# Patient Record
Sex: MALE | Race: WHITE | NOT HISPANIC OR LATINO | Employment: OTHER | URBAN - METROPOLITAN AREA
[De-identification: names, ages, dates, MRNs, and addresses within clinical notes are randomized per-mention and may not be internally consistent; named-entity substitution may affect disease eponyms.]

---

## 2018-02-19 ENCOUNTER — OFFICE VISIT CONVERTED (OUTPATIENT)
Dept: DIABETES SERVICES | Facility: HOSPITAL | Age: 58
End: 2018-02-19
Attending: NURSE PRACTITIONER

## 2018-04-02 ENCOUNTER — OFFICE VISIT CONVERTED (OUTPATIENT)
Dept: DIABETES SERVICES | Facility: HOSPITAL | Age: 58
End: 2018-04-02
Attending: NURSE PRACTITIONER

## 2018-05-16 ENCOUNTER — OFFICE VISIT CONVERTED (OUTPATIENT)
Dept: DIABETES SERVICES | Facility: HOSPITAL | Age: 58
End: 2018-05-16
Attending: NURSE PRACTITIONER

## 2018-06-19 ENCOUNTER — OFFICE VISIT CONVERTED (OUTPATIENT)
Dept: DIABETES SERVICES | Facility: HOSPITAL | Age: 58
End: 2018-06-19
Attending: NURSE PRACTITIONER

## 2018-08-09 ENCOUNTER — OFFICE VISIT CONVERTED (OUTPATIENT)
Dept: DIABETES SERVICES | Facility: HOSPITAL | Age: 58
End: 2018-08-09
Attending: NURSE PRACTITIONER

## 2018-08-23 ENCOUNTER — OFFICE VISIT CONVERTED (OUTPATIENT)
Dept: DIABETES SERVICES | Facility: HOSPITAL | Age: 58
End: 2018-08-23
Attending: NURSE PRACTITIONER

## 2018-10-01 ENCOUNTER — OFFICE VISIT CONVERTED (OUTPATIENT)
Dept: DIABETES SERVICES | Facility: HOSPITAL | Age: 58
End: 2018-10-01
Attending: NURSE PRACTITIONER

## 2018-11-05 ENCOUNTER — OFFICE VISIT CONVERTED (OUTPATIENT)
Dept: DIABETES SERVICES | Facility: HOSPITAL | Age: 58
End: 2018-11-05
Attending: NURSE PRACTITIONER

## 2019-01-02 ENCOUNTER — HOSPITAL ENCOUNTER (OUTPATIENT)
Dept: DIABETES SERVICES | Facility: HOSPITAL | Age: 59
Discharge: HOME | End: 2019-01-02
Attending: NURSE PRACTITIONER

## 2019-01-02 ENCOUNTER — OFFICE VISIT CONVERTED (OUTPATIENT)
Dept: DIABETES SERVICES | Facility: HOSPITAL | Age: 59
End: 2019-01-02
Attending: NURSE PRACTITIONER

## 2019-01-29 ENCOUNTER — OFFICE VISIT CONVERTED (OUTPATIENT)
Dept: DIABETES SERVICES | Facility: HOSPITAL | Age: 59
End: 2019-01-29
Attending: NURSE PRACTITIONER

## 2019-01-29 ENCOUNTER — HOSPITAL ENCOUNTER (OUTPATIENT)
Dept: DIABETES SERVICES | Facility: HOSPITAL | Age: 59
Discharge: HOME | End: 2019-01-29
Attending: NURSE PRACTITIONER

## 2019-02-20 ENCOUNTER — OFFICE VISIT CONVERTED (OUTPATIENT)
Dept: GASTROENTEROLOGY | Facility: CLINIC | Age: 59
End: 2019-02-20
Attending: NURSE PRACTITIONER

## 2019-03-07 ENCOUNTER — OFFICE VISIT CONVERTED (OUTPATIENT)
Dept: DIABETES SERVICES | Facility: HOSPITAL | Age: 59
End: 2019-03-07
Attending: NURSE PRACTITIONER

## 2019-03-07 ENCOUNTER — HOSPITAL ENCOUNTER (OUTPATIENT)
Dept: DIABETES SERVICES | Facility: HOSPITAL | Age: 59
Discharge: HOME | End: 2019-03-07
Attending: NURSE PRACTITIONER

## 2019-05-14 ENCOUNTER — HOSPITAL ENCOUNTER (OUTPATIENT)
Dept: OTHER | Facility: HOSPITAL | Age: 59
Discharge: HOME | End: 2019-05-14
Attending: INTERNAL MEDICINE

## 2019-05-14 LAB
ALBUMIN SERPL-MCNC: 4.2 G/DL (ref 3.5–5)
ALBUMIN/GLOB SERPL: 1.3 {RATIO} (ref 1.4–2.6)
ALP SERPL-CCNC: 146 U/L (ref 56–119)
ALT SERPL-CCNC: 16 U/L (ref 10–40)
ANION GAP SERPL CALC-SCNC: 14 MMOL/L (ref 8–19)
AST SERPL-CCNC: 18 U/L (ref 15–50)
BASOPHILS # BLD AUTO: 0.08 10*3/UL (ref 0–0.2)
BASOPHILS NFR BLD AUTO: 1 % (ref 0–3)
BILIRUB SERPL-MCNC: 0.47 MG/DL (ref 0.2–1.3)
BUN SERPL-MCNC: 19 MG/DL (ref 5–25)
BUN/CREAT SERPL: 19 {RATIO} (ref 6–20)
CALCIUM SERPL-MCNC: 9.7 MG/DL (ref 8.7–10.4)
CHLORIDE SERPL-SCNC: 103 MMOL/L (ref 99–111)
CONV ABS IMM GRAN: 0.03 10*3/UL (ref 0–0.2)
CONV CO2: 29 MMOL/L (ref 22–32)
CONV IMMATURE GRAN: 0.4 % (ref 0–1.8)
CONV RHEUMATOID FACTOR IGM: 59.8 [IU]/ML (ref 0–14)
CONV TOTAL PROTEIN: 7.5 G/DL (ref 6.3–8.2)
CREAT UR-MCNC: 0.99 MG/DL (ref 0.7–1.2)
CRP SERPL-MCNC: 1.7 MG/L (ref 0–5)
DEPRECATED RDW RBC AUTO: 45.2 FL (ref 35.1–43.9)
EOSINOPHIL # BLD AUTO: 0.25 10*3/UL (ref 0–0.7)
EOSINOPHIL # BLD AUTO: 3.1 % (ref 0–7)
ERYTHROCYTE [DISTWIDTH] IN BLOOD BY AUTOMATED COUNT: 13.4 % (ref 11.6–14.4)
ERYTHROCYTE [SEDIMENTATION RATE] IN BLOOD: 13 MM/H (ref 0–20)
GFR SERPLBLD BASED ON 1.73 SQ M-ARVRAT: >60 ML/MIN/{1.73_M2}
GLOBULIN UR ELPH-MCNC: 3.3 G/DL (ref 2–3.5)
GLUCOSE SERPL-MCNC: 248 MG/DL (ref 70–99)
HBA1C MFR BLD: 15.2 G/DL (ref 14–18)
HCT VFR BLD AUTO: 47.2 % (ref 42–52)
LYMPHOCYTES # BLD AUTO: 2.01 10*3/UL (ref 1–5)
MCH RBC QN AUTO: 29.6 PG (ref 27–31)
MCHC RBC AUTO-ENTMCNC: 32.2 G/DL (ref 33–37)
MCV RBC AUTO: 91.8 FL (ref 80–96)
MONOCYTES # BLD AUTO: 0.65 10*3/UL (ref 0.2–1.2)
MONOCYTES NFR BLD AUTO: 7.9 % (ref 3–10)
NEUTROPHILS # BLD AUTO: 5.17 10*3/UL (ref 2–8)
NEUTROPHILS NFR BLD AUTO: 63.1 % (ref 30–85)
NRBC CBCN: 0 % (ref 0–0.7)
OSMOLALITY SERPL CALC.SUM OF ELEC: 303 MOSM/KG (ref 273–304)
PLATELET # BLD AUTO: 320 10*3/UL (ref 130–400)
PMV BLD AUTO: 9.6 FL (ref 9.4–12.4)
POTASSIUM SERPL-SCNC: 4.9 MMOL/L (ref 3.5–5.3)
RBC # BLD AUTO: 5.14 10*6/UL (ref 4.7–6.1)
SODIUM SERPL-SCNC: 141 MMOL/L (ref 135–147)
VARIANT LYMPHS NFR BLD MANUAL: 24.5 % (ref 20–45)
WBC # BLD AUTO: 8.19 10*3/UL (ref 4.8–10.8)

## 2019-05-15 LAB
CONV ANTI NUCLEAR ANTIBODY WITH REFLEX: NEGATIVE
CONV HEPATITIS B SURFACE AG W CONFIRMATION RE: NEGATIVE
HCV AB SER DONR QL: 0.1 S/CO RATIO (ref 0–0.9)

## 2019-05-28 ENCOUNTER — HOSPITAL ENCOUNTER (OUTPATIENT)
Dept: CT IMAGING | Facility: HOSPITAL | Age: 59
Discharge: HOME | End: 2019-05-28
Attending: INTERNAL MEDICINE

## 2019-06-21 ENCOUNTER — HOSPITAL ENCOUNTER (OUTPATIENT)
Dept: OTHER | Facility: HOSPITAL | Age: 59
Discharge: HOME | End: 2019-06-21
Attending: INTERNAL MEDICINE

## 2019-06-24 ENCOUNTER — OFFICE VISIT CONVERTED (OUTPATIENT)
Dept: DIABETES SERVICES | Facility: HOSPITAL | Age: 59
End: 2019-06-24
Attending: NURSE PRACTITIONER

## 2019-06-25 LAB
CONV QUANTIFERON TB GOLD: NEGATIVE
QUANTIFERON CRITERIA: NORMAL
QUANTIFERON MITOGEN VALUE: >10 IU/ML
QUANTIFERON NIL VALUE: 0.06 IU/ML
QUANTIFERON TB1 AG VALUE: 0.1 IU/ML
QUANTIFERON TB2 AG VALUE: 0.03 IU/ML

## 2019-07-29 ENCOUNTER — OFFICE VISIT CONVERTED (OUTPATIENT)
Dept: DIABETES SERVICES | Facility: HOSPITAL | Age: 59
End: 2019-07-29
Attending: NURSE PRACTITIONER

## 2019-09-24 ENCOUNTER — HOSPITAL ENCOUNTER (OUTPATIENT)
Dept: DIABETES SERVICES | Facility: HOSPITAL | Age: 59
Discharge: HOME | End: 2019-09-24
Attending: NURSE PRACTITIONER

## 2019-09-24 ENCOUNTER — OFFICE VISIT CONVERTED (OUTPATIENT)
Dept: DIABETES SERVICES | Facility: HOSPITAL | Age: 59
End: 2019-09-24
Attending: NURSE PRACTITIONER

## 2019-12-04 ENCOUNTER — OFFICE VISIT CONVERTED (OUTPATIENT)
Dept: GASTROENTEROLOGY | Facility: CLINIC | Age: 59
End: 2019-12-04
Attending: NURSE PRACTITIONER

## 2019-12-04 ENCOUNTER — HOSPITAL ENCOUNTER (OUTPATIENT)
Dept: OTHER | Facility: HOSPITAL | Age: 59
Discharge: HOME | End: 2019-12-04
Attending: INTERNAL MEDICINE

## 2019-12-04 LAB
ANION GAP SERPL CALC-SCNC: 17 MMOL/L (ref 8–19)
BASOPHILS # BLD AUTO: 0.03 10*3/UL (ref 0–0.2)
BASOPHILS NFR BLD AUTO: 0.4 % (ref 0–3)
BUN SERPL-MCNC: 25 MG/DL (ref 5–25)
BUN/CREAT SERPL: 28 {RATIO} (ref 6–20)
CALCIUM SERPL-MCNC: 9.9 MG/DL (ref 8.7–10.4)
CHLORIDE SERPL-SCNC: 100 MMOL/L (ref 99–111)
CONV ABS IMM GRAN: 0.03 10*3/UL (ref 0–0.2)
CONV CO2: 26 MMOL/L (ref 22–32)
CONV IMMATURE GRAN: 0.4 % (ref 0–1.8)
CREAT UR-MCNC: 0.9 MG/DL (ref 0.7–1.2)
DEPRECATED RDW RBC AUTO: 46.7 FL (ref 35.1–43.9)
EOSINOPHIL # BLD AUTO: 0 % (ref 0–7)
EOSINOPHIL # BLD AUTO: 0 10*3/UL (ref 0–0.7)
ERYTHROCYTE [DISTWIDTH] IN BLOOD BY AUTOMATED COUNT: 13.4 % (ref 11.6–14.4)
GFR SERPLBLD BASED ON 1.73 SQ M-ARVRAT: >60 ML/MIN/{1.73_M2}
GLUCOSE SERPL-MCNC: 308 MG/DL (ref 70–99)
HCT VFR BLD AUTO: 44.5 % (ref 42–52)
HGB BLD-MCNC: 14.4 G/DL (ref 14–18)
LYMPHOCYTES # BLD AUTO: 1.07 10*3/UL (ref 1–5)
LYMPHOCYTES NFR BLD AUTO: 13.6 % (ref 20–45)
MCH RBC QN AUTO: 30.8 PG (ref 27–31)
MCHC RBC AUTO-ENTMCNC: 32.4 G/DL (ref 33–37)
MCV RBC AUTO: 95.3 FL (ref 80–96)
MONOCYTES # BLD AUTO: 0.25 10*3/UL (ref 0.2–1.2)
MONOCYTES NFR BLD AUTO: 3.2 % (ref 3–10)
NEUTROPHILS # BLD AUTO: 6.49 10*3/UL (ref 2–8)
NEUTROPHILS NFR BLD AUTO: 82.4 % (ref 30–85)
NRBC CBCN: 0 % (ref 0–0.7)
OSMOLALITY SERPL CALC.SUM OF ELEC: 300 MOSM/KG (ref 273–304)
PLATELET # BLD AUTO: 294 10*3/UL (ref 130–400)
PMV BLD AUTO: 9.5 FL (ref 9.4–12.4)
POTASSIUM SERPL-SCNC: 5.7 MMOL/L (ref 3.5–5.3)
RBC # BLD AUTO: 4.67 10*6/UL (ref 4.7–6.1)
SODIUM SERPL-SCNC: 137 MMOL/L (ref 135–147)
WBC # BLD AUTO: 7.87 10*3/UL (ref 4.8–10.8)

## 2020-01-07 ENCOUNTER — OFFICE VISIT CONVERTED (OUTPATIENT)
Dept: GASTROENTEROLOGY | Facility: CLINIC | Age: 60
End: 2020-01-07
Attending: NURSE PRACTITIONER

## 2020-02-17 ENCOUNTER — HOSPITAL ENCOUNTER (OUTPATIENT)
Dept: GASTROENTEROLOGY | Facility: HOSPITAL | Age: 60
Setting detail: HOSPITAL OUTPATIENT SURGERY
Discharge: HOME | End: 2020-02-17
Attending: INTERNAL MEDICINE

## 2021-05-15 VITALS
HEIGHT: 71 IN | DIASTOLIC BLOOD PRESSURE: 65 MMHG | HEART RATE: 103 BPM | SYSTOLIC BLOOD PRESSURE: 130 MMHG | BODY MASS INDEX: 20.09 KG/M2 | WEIGHT: 143.5 LBS

## 2021-05-15 VITALS
HEART RATE: 99 BPM | WEIGHT: 141.12 LBS | SYSTOLIC BLOOD PRESSURE: 141 MMHG | BODY MASS INDEX: 19.76 KG/M2 | DIASTOLIC BLOOD PRESSURE: 65 MMHG | HEIGHT: 71 IN

## 2021-05-16 VITALS
HEART RATE: 90 BPM | BODY MASS INDEX: 21.28 KG/M2 | SYSTOLIC BLOOD PRESSURE: 141 MMHG | HEIGHT: 71 IN | WEIGHT: 152 LBS | DIASTOLIC BLOOD PRESSURE: 96 MMHG

## 2021-05-28 NOTE — PROGRESS NOTES
Patient: QUETA CABALLERO     Acct: [id number]     Report: #[id number]  UNIT #: [id number]     : 1960    Encounter Date:2019  PRIMARY CARE: LAURENCE STRONG  ***Signed***  --------------------------------------------------------------------------------------------------------------------  Encounter Date      2019            Reason for Visit      This patient is seen in the office today for follow-up evaluation for insulin     pump management.  He is a 59-year-old male patient with a history of type 1     diabetes uncontrolled.  Patient is currently managed on a Medtronic insulin     pump.            This patient has had a history of problematic hypoglycemia.  He has been on a     continuous glucose sensor with this pump in the past however he struggles with     accuracy of that sensor.  He is in need of an upgrade to his pump.  I feel like     this patient would benefit considerably from a Medtronic 670 G insulin pump with    continuous glucose sensor and auto mode feature to help minimize his risk of     hypoglycemia.  The patient states he was recently approved for disability and is    awaiting finalization so that he can attempt to get an upgrade of his pump with     his new insurance.            His pump record was uploaded for 14-day period of time and reviewed with the     patient.  The pump record indicates an average glucose of 165 mg/dL  ±83 mg/dL.      He is testing his blood sugar 8 times a day.  He is using approximately 35 units    of insulin each day.  During this 14-day period of time the patient continues to    struggle with hypoglycemia.  There were 11 recorded episodes of hypoglycemia     during this timeframe.  Glucose levels widely fluctuate from greater than 400 to    less than 40.  It is difficult to determine if there is a pattern of behavior or    any particular cause of the hypoglycemia.  It does appear that there could be     some correlation between bolusing  for carbohydrates and correction that could be    causing some of the hypoglycemia.            History            History: He denies any health issues or changes since last being seen.            Allergies/Medications      Allergies:        Coded Allergies:             *No Known Allergies (Verified  Allergy, Unknown, 4/23/19)      Medications    Last Reconciled on 3/11/19 3:23 pm by ESTELA GRANDE      Insulin Lispro (HumaLOG VIAL*) 100 Units/Ml Vial      35 SUBQ QDAY, #1 VIAL 0 Refills         Reported         10/9/17            Quality Measures      Current yr A1c result 7-9%:  Yes      Pos ua mAlb this yr in chart:  Yes            Impression      Type 1 diabetes uncontrolled            Plan      At this time to minimize the episodes of hypoglycemia the carbohydrate ratio of     1-25 was changed to a ratio of 1-30 and the sensitivity of 1-65 was changed to     1-70.  And this should decrease the amount of insulin he is receiving with sherri    uses and hopefully reduce the risk of hypoglycemia.  The patient will contact     our office as soon as he has final notification of his new insurance so that we     can process a request for an upgrade of his insulin pump with a continuous     glucose sensor.  Given the severity and frequency of his hypoglycemia I feel it     is necessary this patient be monitored using continuous glucose monitoring for     his safety.            Total time spent with patient was 15 minutes of which half of that time was     spent counseling the patient regarding hypoglycemia management            PREVENTION      Influenza Vaccine Declined:  Yes      Encouraged to follow-up with:  PCP regarding preventative exams.                 Disclaimer: Converted document may not contain table formatting or lab diagrams. Please see [employer] System for the authenticated document.

## 2021-05-28 NOTE — PROGRESS NOTES
Patient: QUETA CABALLERO     Acct: [id number]     Report: #[id number]  UNIT #: [id number]     : 1960    Encounter Date:2018  PRIMARY CARE: LAURENCE STRONG  ***Signed***  --------------------------------------------------------------------------------------------------------------------  Encounter Date      2018            Reason for Visit      This patient returns to the office today for follow-up evaluation for insulin     pump management.  He is a 57-year-old male patient with a history of     uncontrolled type I diabetes.  He is currently managed on a Medtronic insulin     pump with continuous glucose sensor.  His insulin pump was uploaded for 2 week     period of time and the records were reviewed with the patient.             The patient reports he's been having problems with his sensor.  He states that     he will wear it and it we'll lose signal or give predictive alerts indicating     that his glucose is falling when in actuality it isn't.  He also reports wide     variations between the sensor glucose and blood glucose.  In review of the pump     reports today the patient has not worn his sensor except for 4 days in the last     2 weeks because of sensor issues.  He states he place the sensor back on today.      After talking with the patient he reports he has not had his transmitter     replaced since getting the pump.            Pump report indicates an average blood glucose of 191 mg/dL plus or -84.  He is     testing his blood sugar 5-7 times each day.  There is insufficient sensor wear     time during this period of time to give sensor average or other data.  Blood     glucose checks indicate significant hyperglycemia in the early morning hours up     until approximate 1 PM.  There is some hyperglycemia in the evening but it is     less frequent.  During this 2 week period of time the patient has also     experienced some episodes of hypoglycemia in the late afternoon  hours.  The     patient states that he is having to double his carbohydrate amounts to prevent     hyperglycemia after bolusing for food intake.  He says if he fails to do this     he will have glucose levels in the 300-400 range.  I'm suspicious that some of     the hypoglycemia that he is experiencing is because of him doubling this     carbohydrate amount.            History: He denies any health issues or changes since last being seen.            Impression: Type I diabetes uncontrolled with hypoglycemia and hyperglycemia            Allergies/Medications      Allergies:        Coded Allergies:             *No Known Allergies (Verified  Allergy, 1/10/13)      Uncoded Allergies:             NO KNOWN DRUG ALLERGY (Allergy, UNK, 3/22/13)      Medications    Last Reconciled on 4/2/18 15:28 by ESTELA GRANDE      Insulin Lispro (HumaLOG VIAL*) 100 Units/Ml Vial      35 SUBQ QDAY, #1 VIAL 0 Refills         Reported         10/9/17            Quality Measures      Current yr A1c less than 7%:  No      Current yr A1c result 7-9%:  No      Current yr A1c more than 9:  No      Cur yr dilat eye exam in chart:  No      Pos ua mAlb this yr in chart:  No      Neg ua mAlb this yr in chart:  No            Plan      Plan: At this time changes were made to the patient's pump setting to prevent     the significant hyperglycemia in the overnight in early morning hours as well     as postprandial hyperglycemia.  In the 12 AM to 4 AM basal rate of 0.6 was     increased to 1.0 and extended through the our of 3 PM (the 4 AM basal rate of     0.4 and 9 AM basal rate of 1.0 were deleted); the carbohydrate ratio was     changed to a 1-15 carbohydrate ratio around-the-clock eliminating the 12 AM     ratio of 1-30 and the 11 AM ratio of 1-20.  The patient is encouraged to get     his sensor transmitter replaced as soon as possible.  He is to monitor very     carefully for hypoglycemia and he is to enter only the correct amount of      carbohydrates into his pump, not to double the amount.  The patient is     instructed to call my office in one week to report the effects of these changes     and to call sooner if he experiences any increase hypoglycemia.  He will     otherwise be scheduled for follow-up appointment in 1 month            Total time spent with patient was 15 minutes of which half of that time was     spent counseling the patient regarding hypoglycemia prevention and treatment            Patient Education      Yes            Hx Influenza Vaccination:  No (DECLINES PER DAUGHTER.)      Influenza Vaccine Declined:  Yes      2 or More Falls Past Year?:  No      Fall Past Year with Injury?:  No      Hx Pneumococcal Vaccination:  No      Encouraged to follow-up with:  PCP regarding preventative exams.                 Disclaimer: Converted document may not contain table formatting or lab diagrams. Please see [employer] [facility] for the authenticated document.

## 2021-05-28 NOTE — PROGRESS NOTES
Patient: QUETA CABALLERO     Acct: [id number]     Report: #[id number]  UNIT #: [id number]     : 1960    Encounter Date:2018  PRIMARY CARE: LAURENCE STRONG  ***Signed***  --------------------------------------------------------------------------------------------------------------------  Encounter Date      2018            Reason for Visit      This patient returns to the office today for follow-up evaluation for insulin     pump management.  He is a 58-year-old male patient with a history of type I     diabetes.  He is currently managed on a Medtronic insulin pump with continuous     glucose sensor.  On 18 the patient called our office to report that he was     having episodes of hypoglycemia after his meals.  After that time the patient     was instructed to adjust his current carb ratio of 1-15 to a new ratio of 1-20.      The patient reports that this helped minimally but he continues to have     problems with hypoglycemia.  Patient states that he has had 911 called 3 times     since his last appointment one month ago because of hypoglycemia.            His insulin pump was uploaded and reviewed.  Records do indicate widely     fluctuating glucose levels.  His average blood glucose is 179+ or -90 mg/dL he     is testing his blood sugar 7-8 times a day.  He's using approximately 44 units     of insulin each day.  There are recorded highs above 400 and lows of less than     40.  The patient states that a week ago EMS was called and he was found to have     a glucose level of 24.  Patient states that he had had a mildly low her glucose     level and he treated it but subsequent to treating it his glucose level went     above 400.  He then took a bolus of insulin and ate an additional snack and     later dropped low.  During this 2 week period of time the patient has     experienced 9 episodes of hypoglycemia.  The episodes of hypoglycemia are     random without any particular  pattern other than possible postprandial post     bolus hypoglycemia.  Some of the episodes are occurring 2 or 3 hours after the     patient has corrected glucose or covered carbohydrate intake while others are     occurring 5 or 6 hours after that behavior.  This makes it very difficult to     troubleshoot the potential cause of the hypoglycemia.  The patient states that     he does note that he is more prone to hypoglycemia if he is outside in the     heat.  The patient has had his thyroid checked by his primary care provider to     determine if this could be a factor contributing to his widely fluctuating     glucose levels.  The patient states that his provider reports that his levels     are within normal limits.            History            History: 3 calls to 911 for hypoglycemia otherwise he denies any health issues     or changes since last being seen.            Allergies/Medications      Allergies:        Coded Allergies:             *No Known Allergies (Verified  Allergy, 1/10/13)      Uncoded Allergies:             NO KNOWN DRUG ALLERGY (Allergy, UNK, 3/22/13)      Medications    Last Reconciled on 6/19/18 15:02 by ESTELA GRANDE      Insulin Lispro (HumaLOG VIAL*) 100 Units/Ml Vial      35 SUBQ QDAY, #1 VIAL 0 Refills         Reported         10/9/17            Quality Measures      Current yr A1c less than 7%:  No      Current yr A1c result 7-9%:  No      Current yr A1c more than 9:  No      Cur yr dilat eye exam in chart:  No      Pos ua mAlb this yr in chart:  No      Neg ua mAlb this yr in chart:  No            Impression      Type I diabetes uncontrolled with hypoglycemia            Plan            Plan: At this time we're at a point that the patient is going to have to be     allowed to run more hyperglycemic to prevent the severe hypoglycemia.  I have     adjusted his carbohydrate ratio again from the current ratio of 1-20 to a new     ratio of 1-25 and I also adjusted his current sensitivity  from 1-75 to a     sensitivity of 1 to 80.  These changes should result in smaller boluses for     correction and carbohydrate coverage.  The patient was also instructed and     strongly encouraged to use temporary basal rate reduction if he anticipates any     outdoor activities her increasing physical activity.  Use of this feature was     demonstrated and the patient verbalized understanding.            In the meantime I am going to contact the insulin pump company to see if the     patient is eligible for an insulin pump upgrade that may be safer for this     patient with more advanced technology that can prevent hypoglycemia.  I feel     this patient would benefit from the Medtronic 670 G insulin pump that can     operate in auto mode to prevent the hypoglycemia as well as hyperglycemia the     patient is experiencing.  Depending on what his insurance will approve we will     proceed from there.  In the meantime the patient is scheduled for follow-up in     6 weeks and he is to contact our office if he continues to have problematic     hypoglycemia.            Total time spent with patient was 10 minutes of which half of that time was     spent counseling the patient regarding use of temporary basal rate to prevent     hypoglycemia            Patient Education      Yes            PREVENTION      Hx Influenza Vaccination:  No (DECLINES PER DAUGHTER.)      Influenza Vaccine Declined:  Yes      2 or More Falls Past Year?:  No      Fall Past Year with Injury?:  No      Hx Pneumococcal Vaccination:  No      Encouraged to follow-up with:  PCP regarding preventative exams.                 Disclaimer: Converted document may not contain table formatting or lab diagrams. Please see [employer] [facility] for the authenticated document.

## 2021-05-28 NOTE — PROGRESS NOTES
Patient: QUETA CABALLERO     Acct: [id number]     Report: #[id number]  UNIT #: [id number]     : 1960    Encounter Date:2018  PRIMARY CARE: LAURENCE STRONG  ***Signed***  --------------------------------------------------------------------------------------------------------------------  Encounter Date      Aug 23, 2018            Reason for Visit      This patient seen in the office today for follow-up evaluation for insulin pump     management.  He is a 58-year-old male patient with a history of type I diabetes     uncontrolled.  He was most recently seen for a set up of a new insulin pump as     his previous device had failed on him.  At his current device was uploaded and     the records are reviewed with the patient on this visit.            Records indicate an average glucose of 175 mg/dL plus or -70 mg/dL.  He is     testing his glucose 8-10 times each day.  He's using approximately 30 units of     insulin each day.  Glucose levels are widely fluctuating with lows in the 40s     and highs above 300.  There is no clear pattern of the hypoglycemia.  There is a    slight increase in episodes during the midday.  The patient does have     hypoglycemia if he has increase physical activity so he will accommodate by     using the temporary basal rate with his pump to prevent that from occurring that    other episodes occur without any warning.  The patient does have a continuous     glucose sensor but he is had lots of difficulties with the sensor accuracy.  He     states it we'll say that he is high and in actuality he is not and vice versa.      It is noted that the patient is wearing his pump infusion sets for up to 6 days.     He was counseled on the importance of changing his infusion sets every 3 days     to help decrease some glycemic variability due to bad infusion sites.            We continue to struggle with trying to achieve glycemic control with this     patient.  Slight  adjustments to correct for hyperglycemia lead to hypoglycemia     and vice versa.  The patient has been evaluated for other factors that may be     contributing to his glycemic variability such as hypothyroidism but this has     been ruled out.  I would like to see the patient on a different pump such as the    Medtronic 670 G with auto mode as this might help prevent some of the hypo-and     hyperglycemia however he is currently attempting to get Medicare disability and     wants to wait until that decision is made before getting a new device.            History            History: He denies any health issues or changes since last being seen.            Allergies/Medications      Allergies:        Coded Allergies:             *No Known Allergies (Verified  Allergy, 1/10/13)      Uncoded Allergies:             NO KNOWN DRUG ALLERGY (Allergy, UNK, 3/22/13)      Medications    Last Reconciled on 8/24/18 15:17 by ESTELA GRANDE      Insulin Lispro (HumaLOG VIAL*) 100 Units/Ml Vial      35 SUBQ QDAY, #1 VIAL 0 Refills         Reported         10/9/17            Quality Measures      Current yr A1c less than 7%:  No      Current yr A1c result 7-9%:  No      Current yr A1c more than 9:  No      Cur yr dilat eye exam in chart:  No      Pos ua mAlb this yr in chart:  No      Neg ua mAlb this yr in chart:  No            Impression      Type I diabetes uncontrolled with hypoglycemia            Plan            Because of the patient's significant risk of hypoglycemia along with the     frequency and severity of hypoglycemia changes were made to his pump settings to    minimize the risk of during the mid morning hours.  The patient is also     hyperglycemic however I am more concerned about trying to resolve the     hypoglycemia at this particular time.  The following changes were made            The 8 AM to 3 PM basal rate of 0.8 was changed with the time block ending at 11     AM and the basal rate decreased to 0.6 until 3  PM.  The patient was strongly     encouraged to try to start using his sensor with more frequency and if he     continues to struggle them he may need to evaluate the need for new transmitter     which could be the problem.  He is also encouraged to changes infusion sets ever    y 3 days and was instructed on the best way to achieve this.  He'll be scheduled    for follow-up appointment in 1 month.  In the meantime I'm going to contact the     pump company to see what sort of options we might have to this patient.            Total time spent with patient was 10 minutes of which half of that time was     spent counseling the patient regarding hypoglycemia prevention and management            Patient Education      Yes            PREVENTION      Hx Influenza Vaccination:  No (DECLINES PER DAUGHTER.)      Influenza Vaccine Declined:  Yes      2 or More Falls Past Year?:  No      Fall Past Year with Injury?:  No      Hx Pneumococcal Vaccination:  No      Encouraged to follow-up with:  PCP regarding preventative exams.                 Disclaimer: Converted document may not contain table formatting or lab diagrams. Please see [employer] [facility] for the authenticated document.

## 2021-05-28 NOTE — PROGRESS NOTES
Patient: QUETA CABALLERO     Acct: [id number]     Report: #[id number]  UNIT #: [id number]     : 1960    Encounter Date:10/01/2018  PRIMARY CARE: LAURENCE STRONG  ***Signed***  --------------------------------------------------------------------------------------------------------------------  Encounter Date      Oct 1, 2018            Reason for Visit      This patient returns to the office today for follow-up evaluation for insulin     pump management.  He is a 58-year-old male patient with a history of type 1     diabetes.  He is currently managed on a Medtronic insulin pump.  His insulin     pump was uploaded for 14-day period of time and the records were reviewed with     the patient.            Records indicate an average blood glucose of 178 mg/dL  ±76 mg/dL.  He is testing    his blood sugars 7-8 times each day.  He is using approximately 30 units of     insulin each day.  Historically the patient has had problematic severe h    ypoglycemia and changes were made to his pump settings at his last appointment     to minimize the risk of the hypoglycemia.  Records do indicate a significant     improvement in the frequency of hypoglycemia.  During this 14-day period of time    there were 3 episodes of hypoglycemia in the 60s which is much improved for this    patient.  However, the patient is having hyperglycemia quite frequently     throughout the day.  We have struggled to maintain balance between reducing the     hyperglycemia and minimizing the risk of hypoglycemia with this patient.  The     patient also has a continuous glucose sensor that he typically wears however he     has had problems with the transmitter not connecting properly.  I did     troubleshoot the device and discovered that the transmitter was not charging     because the  needed a battery replacement.            History            History: He denies any health issues or changes since last being seen.             Allergies/Medications      Allergies:        Coded Allergies:             *No Known Allergies (Verified  Allergy, 1/10/13)      Uncoded Allergies:             NO KNOWN DRUG ALLERGY (Allergy, UNK, 3/22/13)      Medications    Last Reconciled on 10/2/18 09:41 by ESTELA GRANDE      Insulin Lispro (HumaLOG VIAL*) 100 Units/Ml Vial      35 SUBQ QDAY, #1 VIAL 0 Refills         Reported         10/9/17            Quality Measures      Current yr A1c less than 7%:  No      Current yr A1c result 7-9%:  No      Current yr A1c more than 9:  No      Cur yr dilat eye exam in chart:  No      Pos ua mAlb this yr in chart:  No      Neg ua mAlb this yr in chart:  No            Impression      Type 1 diabetes uncontrolled            Plan      At this time we elected to make no changes to the patient's pump settings.  He     was instructed to resume the continuous glucose sensor now that we have been     able to determine why it was not working properly.  We will continue to monitor     his glucose levels and if the hypoglycemia has remained stabilized we will focus    on slowly adjusting the pump to minimize hyperglycemia at his next appointment.     He will be seen for follow-up in 2 months            Total time spent with patient was 10 minutes of which half of that time was     spent counseling the patient regarding reinforced dietary instructions to     minimize risk of hyperglycemia            Patient Education      Yes            PREVENTION      Hx Influenza Vaccination:  No (DECLINES PER DAUGHTER.)      Influenza Vaccine Declined:  Yes      2 or More Falls Past Year?:  No      Fall Past Year with Injury?:  No      Hx Pneumococcal Vaccination:  No      Encouraged to follow-up with:  PCP regarding preventative exams.                 Disclaimer: Converted document may not contain table formatting or lab diagrams. Please see [employer] [facility] for the authenticated document.

## 2021-05-28 NOTE — PROGRESS NOTES
Patient: UQETA CABALLERO     Acct: [id number]     Report: #[id number]  UNIT #: [id number]     : 1960    Encounter Date:2018  PRIMARY CARE: LAURENCE STRONG  ***Signed***  --------------------------------------------------------------------------------------------------------------------  ADDENDUM: 18 1516          Addendum: ESTELA GRANDE on 18 @ 15:15             A discussion was also held with the patient regarding his depression and lack     of sleep.  He was encouraged to talk to his PCP to determine if anti-    depressants are needed and possibly the need for a sleep study.     Encounter Date      May 16, 2018            Reason for Visit      This patient seen in the office today for follow-up evaluation for insulin pump     management.  He is a 57-year-old male patient with history of uncontrolled type     I diabetes.  Patient is currently managed on a Medtronic insulin pump with     continuous glucose sensor.  This patient has had problematic widely fluctuating     glucose levels for long period of time.  Because there is no clear pattern to     his hypoglycemia or hyperglycemia it is challenging to fine-tune his pump to     correct either issue.  His pump was uploaded for 14 day period of time and the     records were reviewed with the patient.            Pump records indicate an average glucose of 169 mg/dL plus or -70 mg/dL.  He is     testing his blood sugar 4-5 times each day.  He's using naproxen a 40 units of     insulin each day.  Sensor glucose average is 142 mg/dL plus or -54 mg/dL.      There is noted some episodes of hypoglycemia occurring in the overnight hours     between 2 AM and 8 AM.  Otherwise the patient has random episodes of     hypoglycemia throughout the day as well as random episodes of severe     hyperglycemia throughout the day.             The patient is very frustrated because he feels like he is exhausted all the     time.  He is uncertain if this  is because he's not sleeping well or if it is     because his glucose levels are out of control.  He states he feels a bit     depressed.  He is afraid to ride his motorcycle because he is afraid he will     have a low glucose.  The patient reports that on Sunday 4 days ago that he had     a glucose that dropped to 54 and he was acting very strange and his family     called EMS who treated him at home but did not take him to the hospital.  The     patient states that when he goes to bed he is up every 2 hours or so and never     gets a full night sleep.  Sometimes he is awakened because of hypoglycemia     alerts from his sensor but this is not always the reason why he is getting     awakened in the night.  The patient states he is never had a sleep study to     evaluate for sleep apnea or other causes of his inability to sleep at night.            History            History: The patient reports that he is aggravated an old back injury.  He     states that he was attempting to move a piece of furniture and strained his     back.  He has been taking Tylenol and ibuprofen and was prescribed a muscle     relaxer by his primary care provider today.  He appears to be in obvious     discomfort as he sits in the office.            Allergies/Medications      Allergies:        Coded Allergies:             *No Known Allergies (Verified  Allergy, 1/10/13)      Uncoded Allergies:             NO KNOWN DRUG ALLERGY (Allergy, UNK, 3/22/13)      Medications    Last Reconciled on 4/2/18 15:28 by ESTELA GRANDE      Insulin Lispro (HumaLOG VIAL*) 100 Units/Ml Vial      35 SUBQ QDAY, #1 VIAL 0 Refills         Reported         10/9/17            Quality Measures      Current yr A1c less than 7%:  No      Current yr A1c result 7-9%:  No      Current yr A1c more than 9:  No      Cur yr dilat eye exam in chart:  No      Pos ua mAlb this yr in chart:  No      Neg ua mAlb this yr in chart:  No            Impression      Type I diabetes  uncontrolled, back pain            Plan            Plan: As mentioned previously 3 difficult to determine the appropriate     adjustments to make to the patient's pump settings because he is both high and     low at the same time frames during the days from day-to-day.  Because of the     danger of the hypoglycemia I have made changes to his pump settings to try to     minimize the risk of the hypoglycemia.  The following changes were made to his     basal rates:            The 12 AM to 3 PM basal rate of 1.0 was changed to 0.8 units per hour until 8 AM      And 8 AM to 3 PM basal rate of 1.0 was added      The 3 PM to 5 PM basal rate of 1.2 was not changed      The 5 PM to 8 PM basal rate of 1.3 was not changed      The 10 PM to 12 AM basal rate of 1.2 was not changed.  A lengthy discussion     like the            The very lengthy discussion was held with the patient regarding fine-tuning the     use of his pump to prevent both hyperglycemia and hypoglycemia.  The patient     was counseled on monitoring his sensor for trend and marisa that may predict     glucose rise or fall and adjust the recommended dose of insulin for boluses     based on the trend (i.e. if he is trending downward before the meal he may need     less insulin with the meal and vice versa).  Additionally when the patient does     go low he is allowing the threshold suspend alert to see keep the pump     suspended for an extended period of time up to 2 hours and this is leading to     significant hyperglycemia after recovering from an episode of hypoglycemia.      The patient was instructed to take advantage of the threshold suspend during     the overnight hours but when he is awake and alert during the daytime he should     resume the pump and mainly suspended only for the duration of time required to     correct the episode of hypoglycemia.              I did contact the patient's pump company to see when the warranty is up on his     current  device.  I feel the patient would be much more easily managed with a     Medtronic 670 G pump with guardian sensor as it can operate in auto mode and     has more hypoglycemia prevention features.  After calling the pump company his     warranty does not  for 2 years.  I will contact the Medtronic rep to see     if there are any options available for this patient for an upgrade.  Otherwise     the patient be scheduled for a follow-up appointment in 1 month.  He is to     monitor very closely to see if the changes made today eliminate the episodes of     hypoglycemia.  He will call this office if they are not corrected so that     additional changes can be made.            Total time spent with patient was 15 minutes of which half of that time was     spent counseling the patient regarding using continuous glucose sensor trends     to modifying insulin dosing and appropriate use of the threshold suspend     feature of the pump.            Patient Education      Yes            PREVENTION      Hx Influenza Vaccination:  No (DECLINES PER DAUGHTER.)      Influenza Vaccine Declined:  Yes      2 or More Falls Past Year?:  No      Fall Past Year with Injury?:  No      Hx Pneumococcal Vaccination:  No      Encouraged to follow-up with:  PCP regarding preventative exams.                 Disclaimer: Converted document may not contain table formatting or lab diagrams. Please see [employer] for the authenticated document.

## 2021-05-28 NOTE — PROGRESS NOTES
Patient: QUETA CABALLERO     Acct: [id number]     Report: #[id number]  UNIT #: [id number]     : 1960    Encounter Date:2019  PRIMARY CARE: LAURENCE STRONG  ***Signed***  --------------------------------------------------------------------------------------------------------------------  Encounter Date      2019            Reason for Visit      This patient was seen in the office today for follow-up evaluation for insulin     pump management.  He is a 59-year-old male patient with a history of type 1     diabetes uncontrolled.  Patient called the office requesting an earlier     appointment than previously scheduled due to problematic hypoglycemia.  He     states approximately 3 weeks ago his family had to call 911 because of him     having an episode of severe hypoglycemia.  The patient is not wearing his     continuous glucose sensor because he says that it is not accurate and has     multiple false alarms.            The pump record indicates an average blood glucose 188 mg/dL  ±100 mg/dL.  He is     testing his blood sugar 6-8 times each day.  He is using approximately 35 units     of insulin each day.  In review of the report there are multiple episodes of     hypoglycemia primarily occurring in the late afternoon and evening hours.  The     patient states that when he has these episodes of hypoglycemia he is not     necessarily aware that it is happening.  He states that he will be setting at     the house not doing much and then next thing and now his glucose level has     bottomed out on him.  He states that he is eating his normal diet and denies any    particular change in behavior that may be contributing to the hypoglycemia.      There are multiple episodes of hypoglycemia less than 40 mg/dL documented in the    pump record.  The patient has been counseled on using his temporary basal rate     feature of the pump if he anticipates increased physical activity to prevent      hypoglycemia.  Review of the record indicates that several of the episodes may     be due to hypoglycemia after bolusing for correction or carbohydrate intake.            History            History: He denies any health issues or changes since last being seen.            Allergies/Medications      Allergies:        Coded Allergies:             *No Known Allergies (Verified  Allergy, Unknown, 19)      Medications    Last Reconciled on 3/11/19 3:23 pm by ESTELA GRANDE      Insulin Lispro (HumaLOG VIAL*) 100 Units/Ml Vial      35 SUBQ QDAY, #1 VIAL 0 Refills         Reported         10/9/17            Quality Measures      Current yr A1c result 7-9%:  Yes      Pos ua mAlb this yr in chart:  Yes            Impression      Type 1 diabetes with hypoglycemia            Plan            To minimize the risk of late afternoon and postprandial hypoglycemia the     following changes were made to his pump settings:            3 PM to 10 PM basal rate of 1.4 was reduced to 1.2      The carbohydrate ratio of 1-20 was adjusted to 1-25      The insulin sensitivity of 1-60 was changed to 1-65            The patient was encouraged to contact the pump company to see when the warranty     on his pump has .  This patient would benefit from having more accurate     sensor technology.  In the meantime the patient will contact our office if these    changes do not immediately correct the hypoglycemia episodes.  He will be     scheduled for follow-up appointment in 1 month.  We will follow-up with the     patient by phone during the interim between appointments.            Total time spent with patient was 15 minutes of which half of that time was     spent counseling the patient regarding hypoglycemia management            PREVENTION      Influenza Vaccine Declined:  Yes      Encouraged to follow-up with:  PCP regarding preventative exams.                 Disclaimer: Converted document may not contain table formatting or lab  diagrams. Please see [employer] for the authenticated document.

## 2021-05-28 NOTE — PROGRESS NOTES
Patient: QUETA CABALLERO     Acct: [id number]     Report: #[id number]  UNIT #: [id number]     : 1960    Encounter Date:2018  PRIMARY CARE: LAURENCE STRONG  ***Signed***  --------------------------------------------------------------------------------------------------------------------  Encounter Date      2018            Reason for Visit      This patient is seen in the office today for follow-up evaluation for insulin     pump management.  He is a 58-year-old male patient with a history of type 1     diabetes.  He is currently managed on a Medtronic insulin pump with continuous     glucose sensor.  His device was uploaded for 14-day period of time and records     were reviewed with the patient.            Pump records indicate an average blood glucose of 197 mg/dL  ±75 mg/dL.  He is te    sting his blood sugar 8-9 times each day.  He is using approximately 35 units of    insulin each day.            The patient also wears a personal continuous glucose sensor.  The patient     reports that he has been having problems with the sensor giving him Aricept     times.  He has been in contact with [employer] to discuss these issues.  At this     time the sensor report indicates an average sensor glucose of 159 mg/dL  ±60     mg/dL.  There is a pattern of lower glucose levels during the overnight hours     between 11 PM and 7 AM.  Average glucose level is within normal limits with the     patient does appear to be having some frequent episodes of hypoglycemia acco    rding to the sensor in the overnight hours.  The patient denies any problematic     hypoglycemia during the daytime.            History            History: He denies any health issues or changes since last being seen.            Allergies/Medications      Allergies:        Coded Allergies:             *No Known Allergies (Verified  Allergy, 1/10/13)      Uncoded Allergies:             NO KNOWN DRUG ALLERGY (Allergy, UNK, 3/22/13)       Medications    Last Reconciled on 11/5/18 4:32 pm by ESTELA GRANDE      Insulin Lispro (HumaLOG VIAL*) 100 Units/Ml Vial      35 SUBQ QDAY, #1 VIAL 0 Refills         Reported         10/9/17            Quality Measures      Current yr A1c less than 7%:  No      Current yr A1c result 7-9%:  No      Current yr A1c more than 9:  No      Cur yr dilat eye exam in chart:  No      Pos ua mAlb this yr in chart:  No      Neg ua mAlb this yr in chart:  No            Impression      Type 1 diabetes uncontrolled            Plan      Because of the patient's overnight hypoglycemia and daytime hyperglycemia small     changes were made to the patient's pump settings as follows:            12 AM basal rate of 0.6 was decreased to 0.4      8 AM basal rate of 0.8 was increased to 0.9 11 AM basal rate of 0.6 was     increased to 0.7      3 PM basal rate of 1.0 was increased to 1.1      5 PM basal rate of 1.1 was not changed      10 PM basal rate of 1.0 was not changed.            The patient is encouraged to continue monitoring his glucose closely and use the    continuous glucose sensor.  I will contact the [employer] rep to see if we can     get some assistance with the patient's troubleshooting of the device and get     sensors replaced since he has gone through them due to errors            Total time spent with patient was 10 minutes of which half of that time was     spent counseling the patient regarding troubleshooting continuous glucose sensor    issues            Patient Education      Yes            PREVENTION      Hx Influenza Vaccination:  No (DECLINES PER DAUGHTER.)      Influenza Vaccine Declined:  Yes      2 or More Falls Past Year?:  No      Fall Past Year with Injury?:  No      Hx Pneumococcal Vaccination:  No      Encouraged to follow-up with:  PCP regarding preventative exams.                 Disclaimer: Converted document may not contain table formatting or lab diagrams. Please see [employer] for the authenticated document.

## 2021-05-28 NOTE — PROGRESS NOTES
Patient: QUETA CABALLERO     Acct: [id number]     Report: #[id number]  UNIT #: [id number]     : 1960    Encounter Date:2019  PRIMARY CARE: LAURENCE STRONG  ***Signed***  --------------------------------------------------------------------------------------------------------------------  Encounter Date      Mar 7, 2019            Reason for Visit      This patient returns to the office today for follow-up evaluation for insulin     pump management.  He is a 58-year-old male patient with a history of type 1     diabetes uncontrolled.  He is currently managed on a Medtronic insulin pump with    continuous glucose sensor.  His devices were uploaded and reviewed with the     patient.            Patient reports that he has only been wearing his sensor randomly because he is     having lots of discrepancies between his sensor glucose values and his     fingerstick glucose value.  The patient does indicate that the transmitter that     he is currently using is probably 2 years old and this is most likely the reason    for the discrepancies.  The pump record indicates a fingerstick glucose average     of 171 mg/dL  ±88 mg/dL.  He is testing his blood sugar approximately 8 times a     day.  He is using approximately 40 units of insulin each day.  Glucose levels     are widely fluctuating ranging between 43 mg/dL and greater than 400 mg/dL.  We     have been challenged to get the patient's glucose levels under good control     because of him having problematic hypoglycemia at times.  After a lengthy talk     with the patient regarding his carbohydrate counting I do feel that some of his     lows may be due to overestimating the amount of carbohydrates in his diet.  It     was suggested that he air on the low side to prevent hypoglycemia when he is in     doubt.  The episodes of hypoglycemia are occurring at random times a day without    any particular pattern of behavior.  There is very little sensor  time during     this 14-day period of time to truly evaluate any patterns based on the sensor     data.            A1c collected on 1/3/19 was 8.2%.  The patient states that he did gain 6 pounds     back which is very good for this patient.            History            History: The patient is under the care of a new provider who has been doing     quite a few test on the patient to determine any underlying health conditions     that may be contributing to his overall health status.  At this time he denies     any health issues or changes since last being seen.            Allergies/Medications      Allergies:        Coded Allergies:             *No Known Allergies (Verified  Allergy, 1/10/13)      Uncoded Allergies:             NO KNOWN DRUG ALLERGY (Allergy, UNK, 3/22/13)      Medications    Last Reconciled on 3/11/19 3:23 pm by ESTELA GRANDE      Insulin Lispro (HumaLOG VIAL*) 100 Units/Ml Vial      35 SUBQ QDAY, #1 VIAL 0 Refills         Reported         10/9/17            Quality Measures      Current yr A1c result 7-9%:  Yes      Pos ua mAlb this yr in chart:  Yes            Impression      Type 1 diabetes uncontrolled            Plan            Because the patient has some fasting hyperglycemia the following changes were     made to his pump settings:            2 AM to 6 AM basal rate of 0.8 was increased to 0.9      6 AM to 11 AM basal rate of 0.9 was increased to 1.0            The patient was urged to monitor his glucose levels very carefully as he is very    sensitive to slight changes in his basal rates and this may lead to increased     hypoglycemia.  He was also counseled on the importance of accurate carbohydrate     counting and the importance of carrying on the safe side by under counting his     carbohydrates rather than over counting them.  In the meantime we will contact     the pump company to see if we can get a new transmitter for the patient so that     he can have more accurate sensor  readings.  The patient will be scheduled for a     follow-up appointment in this office in 3 months.  He will call our office if     changes today are problematic.            Total time spent with patient was 10 minutes of which half of that time was     spent counseling the patient regarding hypoglycemia management            PREVENTION      Influenza Vaccine Declined:  Yes      Encouraged to follow-up with:  PCP regarding preventative exams.                 Disclaimer: Converted document may not contain table formatting or lab diagrams. Please see [employer] for the authenticated document.

## 2021-05-28 NOTE — PROGRESS NOTES
Patient: QUETA CABALLERO     Acct: [id number]     Report: #[id number]  UNIT #: [id number]     : 1960    Encounter Date:2019  PRIMARY CARE: LAURENCE STRONG  ***Signed***  --------------------------------------------------------------------------------------------------------------------  Encounter Date      Sep 24, 2019            Reason for Visit      This patient is seen in the office today for follow-up evaluation for insulin     pump management.  He is a 59-year-old male patient with a history of type 1     diabetes.  He is currently managed on a Medtronic insulin pump.  His insulin     pump was uploaded for a 14-day period of time and the record was reviewed with     the patient.            The pump record indicates an average blood glucose of 149 mg/dL  ±76 mg/dL.  He     is testing his blood sugars 8 times a day.  He is currently using approximately     30 units of insulin each day.  The patient is experiencing frequent episodes of     severe hypoglycemia with glucose levels less than 50 mg/dL.  Simultaneously he     is having some episodes of hypoglycemia in the evening hours between 6 PM and 11    PM.  Patient experiences very brittle control of his diabetes as slight changes     to his insulin dose leads to hypoglycemia or hyperglycemia.            Patient complains of feeling tired all the time and is frustrated because his     glucose levels just cannot seem to be in control.  We attempted to get the     patient transition to the Medtronic 670 G with continuous glucose sensor and     auto mode feature however his warrantee is not  on his pump and he cannot    get it replaced at this time.            History            History: He denies any health issues or changes since last being seen.            Allergies/Medications      Allergies:        Coded Allergies:             *No Known Allergies (Verified  Allergy, Unknown, 19)      Medications    Last Reconciled on 3/11/19  3:23 pm by ESTELA GRANDE      Insulin Lispro (HumaLOG VIAL*) 100 Units/Ml Vial      35 SUBQ QDAY, #1 VIAL 0 Refills         Reported         10/9/17            Quality Measures      Current yr A1c result 7-9%:  Yes      Pos ua mAlb this yr in chart:  Yes            Impression      Type 1 diabetes uncontrolled            Plan            At this time multiple changes were made to his pump settings to minimize the     risk of hypoglycemia as well as improve the late evening hyperglycemia.  The     following changes were made:            12 AM basal rate of 0.6 was decreased to 0.4      2 AM basal rate of 0.8 was decreased to 0.6      6 AM basal rate of 0.9 was decreased to 0.7      11 AM basal rate of 1.0 was decreased to 0.8      3 PM basal rate of 1.2 was decreased to 1.0      6 PM basal rate of 1.2 was added      10 PM basal rate of 1.3 was not changed            The patient states that his family physician has made a referral for him to see     Dr. Marcum, and endocrinologist, to help with management of his diabetes.      Patient has an appointment on October 10 with the nurse practitioner, Emilee Santo at that office.  Was explained to the patient that if he is going to see     this provider to manage his insulin pump that I will not conflict with them and     he will need to establish his care in that office.  Therefore, no follow-up     appointment will be made.            Total time spent with patient was 15 minutes of which half of that time was     spent counseling the patient regarding reinforcement of strategies to minimize     risk for hypoglycemia.            Patient Education      Yes            PREVENTION      Influenza Vaccine Declined:  Yes      Encouraged to follow-up with:  PCP regarding preventative exams.            Electronically signed by ESTELA GRANDE  10/24/2019 13:24       Disclaimer: Converted document may not contain table formatting or lab diagrams. Please see [employer] for the authenticated document.

## 2021-05-28 NOTE — PROGRESS NOTES
Patient: QUETA CABALLERO     Acct: [id number]     Report: #[id number]  UNIT #: [id number]     : 1960    Encounter Date:2019  PRIMARY CARE: LAURENCE STRONG  ***Signed***  --------------------------------------------------------------------------------------------------------------------  Encounter Date      2019            Reason for Visit      This patient is seen in the office today for follow-up evaluation for insulin     pump management.  He is a 58-year-old male patient with a history of type 1     diabetes uncontrolled.  He is currently managed on a Medtronic insulin pump with    a continuous glucose sensor.  His device was uploaded and the record was     reviewed with the patient.            The pump record indicates an average blood glucose of 197 mg/dL  ±73 mg/dL he is     testing his blood sugar approximately 10 times each day.  He is using     approximately 45 units of insulin each day.  His continuous glucose sensor     indicates an average sensor glucose of 194 mg/dL  ±66 mg/dL.  There is a clear     pattern of hyperglycemia between the hours of 2 AM and 8 AM.  Glucose levels     just remained steadily elevated throughout the day otherwise.  There does appear    to be less frequency of hypoglycemia on this visit as compared to prior visits.            The patient has been feeling tired and very fatigued over the last few months.      He had a 9 pound unexplained weight loss but states that he has not lost any     additional weight since last being seen.  The patient has been referred to a     different primary care provider [facility] to his generalized feelings of     fatigue and malaise.            History            History: He denies any health issues or changes since last being seen.            Allergies/Medications      Allergies:        Coded Allergies:             *No Known  Allergies (Verified  Allergy, 1/10/13)      Uncoded Allergies:             NO KNOWN DRUG ALLERGY (Allergy, UNK, 3/22/13)      Medications    Last Reconciled on 2/3/19 9:27 pm by ESTELA GRANDE      Insulin Lispro (HumaLOG VIAL*) 100 Units/Ml Vial      35 SUBQ QDAY, #1 VIAL 0 Refills         Reported         10/9/17            Quality Measures      Current yr A1c result 7-9%:  Yes            Impression      Type 1 diabetes uncontrolled            Plan            Because of the pattern of overnight hyperglycemia see me of the following     changes were made to his pump settings:            12 AM to 6 AM basal rate of 0.4 was increased to 0.6 and changed to end at 2 AM      A 2 AM to 6 AM basal rate was added at a rate of 0.8      The 6 AM to 11 AM basal rate of 0.9 was not changed       The 11 AM to 3 PM basal rate of 0.9 was increased to 1.0      The 3 PM to 10 PM basal rate of 1.3 was increased to 1.4      The 10 PM to 12 AM basal rate of 1.2 was increased to 1.3            The patient is a somewhat brittle diabetic and is very prone to significant     hypoglycemia after minor changes were made to his pump settings.  Because of     this he was cautioned to very carefully monitor glucose levels to see if he     begins to develop hypoglycemia.  He will call our office immediately if this is     noted.  Otherwise the patient will be scheduled for a follow-up appointment in     our office in 2 months.            Total time spent with patient was 10 minutes of which half of that time was     spent counseling the patient regarding use of continuous glucose sensor to     adjust insulin doses and prevent hyperglycemia and hypoglycemia            PREVENTION      Hx Influenza Vaccination:  No (DECLINES PER DAUGHTER.)      Influenza Vaccine Declined:  Yes      2 or More Falls Past Year?:  No      Fall Past Year with Injury?:  No      Hx Pneumococcal Vaccination:  No      Encouraged to follow-up with:  PCP regarding  preventative exams.                 Disclaimer: Converted document may not contain table formatting or lab diagrams. Please see [employer] [facility] for the authenticated document.

## 2021-05-28 NOTE — PROGRESS NOTES
Patient: QUETA CABALLERO     Acct: [id number]     Report: #[id number]  UNIT #: [id number]     : 1960    Encounter Date:2019  PRIMARY CARE: LAURENCE STRONG  ***Signed***  --------------------------------------------------------------------------------------------------------------------  Encounter Date      2019            Reason for Visit      This patient returns to the office today for follow-up evaluation for insulin     pump management.  He is a 58-year-old male patient with a history of type 1     diabetes.  He is currently managed on a Medtronic insulin pump with continuous     glucose sensor.  His insulin pump was uploaded and the records were reviewed     with the patient.  Pump records indicate an average blood glucose of 198 mg/dL      ±70 mg/dL he is testing his blood sugar 8-10 times each day.  He is using     approximately 35 units of insulin each day.  The sensor glucose averages 182     mg/dL  ±56 mg/dL.  The patient is persistently hyperglycemic around the clock     with the exception of between 10 AM and 1 PM glucose levels are a bit more     controlled.  The patient has had far less episodes of hypoglycemia than     previously.  He states that he is adding extra amounts of carbohydrate to the     number entered into his pump so that he can receive additional insulin at     mealtimes.            The patient complains of significant fatigue.  He states he just feels tired all    the time.  He also states that he is lost 9 pounds since his last visit which     was on .  He denies having a poor appetite or difficulty eating.  The     patient has not followed up with his primary care provider to investigate these     complaints.            History            History: As stated previously the patient complains of significant fatigue as     well as unexplained weight loss over the last 2 months.            Allergies/Medications      Allergies:        Coded  Allergies:             *No Known Allergies (Verified  Allergy, 1/10/13)      Uncoded Allergies:             NO KNOWN DRUG ALLERGY (Allergy, UNK, 3/22/13)      Medications    Last Reconciled on 1/7/19 10:53 am by ESTELA GRANDE      Insulin Lispro (HumaLOG VIAL*) 100 Units/Ml Vial      35 SUBQ QDAY, #1 VIAL 0 Refills         Reported         10/9/17            Quality Measures      Current yr A1c less than 7%:  No      Current yr A1c result 7-9%:  Yes      Current yr A1c more than 9:  No      Cur yr dilat eye exam in chart:  No      Pos ua mAlb this yr in chart:  Yes      Neg ua mAlb this yr in chart:  No            Impression      Type 1 diabetes uncontrolled            Plan            At this time because the patient was having significantly elevated glucose     levels the following changes were made to his insulin pump settings:            The 12 AM to 4 AM basal rate of 0.4 was changed to end at 6 AM      The 8 AM to 11 AM basal rate of 0.9 it was set to begin at 6 AM      At the 11 AM to 3 PM basal rate of 0.7 was increased to 0.9      View 3 PM to 5 PM basal rate of 1.11 was increased to 1.3      The 5 PM to 10 PM basal rate of 1.1 was increased to 1.3      D 10 PM to 12 AM basal rate of 1.0 was increased to 1.2            Because the patient is also falsely adding additional carbs as to his     carbohydrate dosing in the pump we adjusted his current carbohydrate ratio of 1-    25 down to a ratio of 1-20 and the patient was instructed to use the actual     amount of carbohydrates when entering them into the pump to see if this changes     effective.            In the meantime because of the patient's unexplained weight loss and complaints     of chronic fatigue he was strongly encouraged to follow-up with his family care     provider.  We will obtain a hemoglobin A1c, urine microalbumin as well as a BMP,    lipid panel and urinalysis to see if the patient is in DKA or has some other     metabolic issue causing  the weight loss or fatigue.  Again the patient was     instructed to make an appointment with his PCP as soon as possible so that he     can be evaluated for these complaints.            Total time spent with patient was () minutes of which half of that time was     spent counseling the patient regarding ()            Patient Education      Yes            PREVENTION      Hx Influenza Vaccination:  No (DECLINES PER DAUGHTER.)      Influenza Vaccine Declined:  Yes      2 or More Falls Past Year?:  No      Fall Past Year with Injury?:  No      Hx Pneumococcal Vaccination:  No      Encouraged to follow-up with:  PCP regarding preventative exams.                 Disclaimer: Converted document may not contain table formatting or lab diagrams. Please see [employer] [facility] for the authenticated document.

## 2021-05-28 NOTE — PROGRESS NOTES
Patient: QUETA CABALLERO     Acct: [id number]     Report: #[id number]  UNIT #: [id number]     : 1960    Encounter Date:2018  PRIMARY CARE: LAURENCE TSRONG  ***Signed***  --------------------------------------------------------------------------------------------------------------------  Encounter Date      Aug 9, 2018            Reason for Visit      The patient presents today for assistance in setting up a new pump.  He is a 57 y/o male with history of type 1 DM.  The patient had a pump failure about 3     days ago and received a replacement pump.  He is here to get this device set up.            He does complain of frequent hypoglycemia requiring assistance from family     members.            History            History: () denies any health issues or changes since last being seen.            Allergies/Medications      Allergies:        Coded Allergies:             *No Known Allergies (Verified  Allergy, 1/10/13)      Uncoded Allergies:             NO KNOWN DRUG ALLERGY (Allergy, UNK, 3/22/13)      Medications    Last Reconciled on 18 15:02 by ESTELA GRANDE      Insulin Lispro (HumaLOG VIAL*) 100 Units/Ml Vial      35 SUBQ QDAY, #1 VIAL 0 Refills         Reported         10/9/17            Quality Measures      Current yr A1c less than 7%:  No      Current yr A1c result 7-9%:  No      Current yr A1c more than 9:  No      Cur yr dilat eye exam in chart:  No      Pos ua mAlb this yr in chart:  No      Neg ua mAlb this yr in chart:  No            Impression      type 1 dm            Plan      The pump was setup using the settings from his previous device except his basal     rates were decreased due to his c/o frequent hypoglycemia:            12 am rate of 0.8 was decreased to 0.6      8 am rate if 1.0 was decreased to 0.8      3 pm rate of 1.2 was decreased to 1.0      5 pm rate of 1.3 was decreased to 1.1      10 pm rate of 1.2 was decreased to 1.0            He will return for  f/u in 1 month            Patient Education      Yes            PREVENTION      Hx Influenza Vaccination:  No (DECLINES PER DAUGHTER.)      Influenza Vaccine Declined:  Yes      2 or More Falls Past Year?:  No      Fall Past Year with Injury?:  No      Hx Pneumococcal Vaccination:  No      Encouraged to follow-up with:  PCP regarding preventative exams.                 Disclaimer: Converted document may not contain table formatting or lab diagrams. Please see [employer] [facility] for the authenticated document.

## 2021-05-28 NOTE — PROGRESS NOTES
Patient: QUETA CABALLERO     Acct: [id number]     Report: #[id number]  UNIT #: [id number]     : 1960    Encounter Date:2018  PRIMARY CARE: LAURENCE STRONG  ***Signed***  --------------------------------------------------------------------------------------------------------------------  ADDENDUM: 18 1453          Addendum: ESTELA GRANDE on 18 @ 14:52             11 am - 9 pm carb ration of 1:25 was changed to 1:20)     Encounter Date      2018            Reason for Visit      This patient is seen today for f/u appointment for insulin pump evaluation.  He     is currently managed on a Medtronic insulin pump with continuous glucose     sensor.  The patient reports he was sent the wrong type of sensor and he has     not been able to use the sensor for about 3 weeks or so.  He has called the     company to have the devices replaced and is awaiting shipment.              His pump was downloaded for a 2 week period of time and the record was reviewed     with the patient.  Records indicate an average BG of 165 mg/dl +/- 78 mg/dl.      He is testing his BG 5 times per day and is using approximately 35 units of     insulin per day.  He is having widely fluctuating BG levels throughout the day.      He states he is having highs after eating in the afternoon and evenings but     is having lows in the mornings.  He generally has much better control of his BG     levels when he is wearing the sensor.            History: He denies any health issues or changes since last being seen.            Impression: Type 1 DM with hypoglycemia               Total time spent with patient was 10 minutes of which half of that time was     spent counseling the patient regarding hypoglycemia prevention.            Allergies/Medications      Allergies:        Coded Allergies:             *No Known Allergies (Verified  Allergy, 1/10/13)      Uncoded Allergies:             NO KNOWN DRUG ALLERGY (Allergy,  PRASANNA, 3/22/13)      Medications    Last Reconciled on 12/18/17 14:31 by ESTELA GRANDE      Insulin Lispro (HumaLOG VIAL*) 100 Units/Ml Vial      35 SUBQ QDAY, #1 VIAL 0 Refills         Reported         10/9/17            Quality Measures      Current yr A1c less than 7%:  No      Current yr A1c result 7-9%:  No      Current yr A1c more than 9:  No      Cur yr dilat eye exam in chart:  No      Pos ua mAlb this yr in chart:  No      Neg ua mAlb this yr in chart:  No            Plan      At this time the following changes were made to his pump settings to reduce the     risk of early morning hypoglycemia and post-prandial hyperglycemia:            12 am - 4 am basal rate of 0.8 was decreased to 0.6      4 am - 9 am basal rate of 0.6 was decreased to 0.4      11 am - 9 pm carb ratio             The patient will be seen for a f/u appt in 3 months.  He is to call if the     changes do what resolve the issues.            Hx Influenza Vaccination:  No (DECLINES PER DAUGHTER.)      Influenza Vaccine Declined:  Yes      Hx Pneumococcal Vaccination:  No      Encouraged to follow-up with:  PCP regarding preventative exams.                 Disclaimer: Converted document may not contain table formatting or lab diagrams. Please see [employer] [facility] for the authenticated document.

## 2022-08-04 ENCOUNTER — TRANSCRIBE ORDERS (OUTPATIENT)
Dept: ADMINISTRATIVE | Facility: HOSPITAL | Age: 62
End: 2022-08-04

## 2022-08-04 DIAGNOSIS — R09.89 DECREASED DORSALIS PEDIS PULSE: Primary | ICD-10-CM

## 2022-08-26 ENCOUNTER — HOSPITAL ENCOUNTER (OUTPATIENT)
Dept: CARDIOLOGY | Facility: HOSPITAL | Age: 62
Discharge: HOME | End: 2022-08-26
Admitting: INTERNAL MEDICINE

## 2022-08-26 DIAGNOSIS — R09.89 DECREASED DORSALIS PEDIS PULSE: ICD-10-CM

## 2022-08-26 LAB
BH CV LOWER ARTERIAL LEFT ABI RATIO: 1.25
BH CV LOWER ARTERIAL LEFT DORSALIS PEDIS SYS MAX: 166
BH CV LOWER ARTERIAL LEFT GREAT TOE SYS MAX: 140
BH CV LOWER ARTERIAL LEFT POST TIBIAL SYS MAX: 130
BH CV LOWER ARTERIAL LEFT TBI RATIO: 1.05
BH CV LOWER ARTERIAL RIGHT ABI RATIO: 1.1
BH CV LOWER ARTERIAL RIGHT DORSALIS PEDIS SYS MAX: 140
BH CV LOWER ARTERIAL RIGHT GREAT TOE SYS MAX: 89
BH CV LOWER ARTERIAL RIGHT POST TIBIAL SYS MAX: 146
BH CV LOWER ARTERIAL RIGHT TBI RATIO: 0.67
MAXIMAL PREDICTED HEART RATE: 157 BPM
STRESS TARGET HR: 133 BPM
UPPER ARTERIAL LEFT ARM BRACHIAL SYS MAX: 133 MMHG
UPPER ARTERIAL RIGHT ARM BRACHIAL SYS MAX: 133 MMHG

## 2022-08-26 PROCEDURE — 93922 UPR/L XTREMITY ART 2 LEVELS: CPT | Performed by: SURGERY

## 2022-08-26 PROCEDURE — 93922 UPR/L XTREMITY ART 2 LEVELS: CPT

## 2023-01-01 ENCOUNTER — HOSPITAL ENCOUNTER (INPATIENT)
Facility: HOSPITAL | Age: 63
LOS: 5 days | Discharge: HOME | DRG: 64 | End: 2023-01-07
Attending: EMERGENCY MEDICINE | Admitting: INTERNAL MEDICINE
Payer: MEDICARE

## 2023-01-01 DIAGNOSIS — R20.0 LEFT SIDED NUMBNESS: Primary | ICD-10-CM

## 2023-01-01 DIAGNOSIS — I10 UNCONTROLLED HYPERTENSION: ICD-10-CM

## 2023-01-01 DIAGNOSIS — J18.9 PNEUMONIA OF BOTH LUNGS DUE TO INFECTIOUS ORGANISM, UNSPECIFIED PART OF LUNG: ICD-10-CM

## 2023-01-01 DIAGNOSIS — J44.9 CHRONIC OBSTRUCTIVE PULMONARY DISEASE, UNSPECIFIED COPD TYPE: ICD-10-CM

## 2023-01-01 DIAGNOSIS — G45.9 TIA (TRANSIENT ISCHEMIC ATTACK): ICD-10-CM

## 2023-01-01 DIAGNOSIS — R26.2 DIFFICULTY IN WALKING: ICD-10-CM

## 2023-01-01 LAB
ALBUMIN SERPL-MCNC: 4 G/DL (ref 3.5–5.2)
ALBUMIN/GLOB SERPL: 1.1 G/DL
ALP SERPL-CCNC: 117 U/L (ref 39–117)
ALT SERPL W P-5'-P-CCNC: 12 U/L (ref 1–41)
ANION GAP SERPL CALCULATED.3IONS-SCNC: 7.5 MMOL/L (ref 5–15)
AST SERPL-CCNC: 17 U/L (ref 1–40)
BASOPHILS # BLD AUTO: 0.1 10*3/MM3 (ref 0–0.2)
BASOPHILS NFR BLD AUTO: 1 % (ref 0–1.5)
BILIRUB SERPL-MCNC: 0.3 MG/DL (ref 0–1.2)
BUN SERPL-MCNC: 21 MG/DL (ref 8–23)
BUN/CREAT SERPL: 26.9 (ref 7–25)
CALCIUM SPEC-SCNC: 9.5 MG/DL (ref 8.6–10.5)
CHLORIDE SERPL-SCNC: 101 MMOL/L (ref 98–107)
CO2 SERPL-SCNC: 34.5 MMOL/L (ref 22–29)
CREAT SERPL-MCNC: 0.78 MG/DL (ref 0.76–1.27)
DEPRECATED RDW RBC AUTO: 44.4 FL (ref 37–54)
EGFRCR SERPLBLD CKD-EPI 2021: 100.2 ML/MIN/1.73
EOSINOPHIL # BLD AUTO: 0.38 10*3/MM3 (ref 0–0.4)
EOSINOPHIL NFR BLD AUTO: 3.7 % (ref 0.3–6.2)
ERYTHROCYTE [DISTWIDTH] IN BLOOD BY AUTOMATED COUNT: 13.2 % (ref 12.3–15.4)
GLOBULIN UR ELPH-MCNC: 3.7 GM/DL
GLUCOSE SERPL-MCNC: 110 MG/DL (ref 65–99)
HCT VFR BLD AUTO: 45.9 % (ref 37.5–51)
HGB BLD-MCNC: 14.9 G/DL (ref 13–17.7)
HOLD SPECIMEN: NORMAL
HOLD SPECIMEN: NORMAL
IMM GRANULOCYTES # BLD AUTO: 0.03 10*3/MM3 (ref 0–0.05)
IMM GRANULOCYTES NFR BLD AUTO: 0.3 % (ref 0–0.5)
INR PPP: 0.98 (ref 0.86–1.15)
LYMPHOCYTES # BLD AUTO: 2.17 10*3/MM3 (ref 0.7–3.1)
LYMPHOCYTES NFR BLD AUTO: 20.8 % (ref 19.6–45.3)
MCH RBC QN AUTO: 29.8 PG (ref 26.6–33)
MCHC RBC AUTO-ENTMCNC: 32.5 G/DL (ref 31.5–35.7)
MCV RBC AUTO: 91.8 FL (ref 79–97)
MONOCYTES # BLD AUTO: 0.89 10*3/MM3 (ref 0.1–0.9)
MONOCYTES NFR BLD AUTO: 8.5 % (ref 5–12)
NEUTROPHILS NFR BLD AUTO: 6.84 10*3/MM3 (ref 1.7–7)
NEUTROPHILS NFR BLD AUTO: 65.7 % (ref 42.7–76)
NRBC BLD AUTO-RTO: 0 /100 WBC (ref 0–0.2)
PLATELET # BLD AUTO: 344 10*3/MM3 (ref 140–450)
PMV BLD AUTO: 8.8 FL (ref 6–12)
POTASSIUM SERPL-SCNC: 4.5 MMOL/L (ref 3.5–5.2)
PROT SERPL-MCNC: 7.7 G/DL (ref 6–8.5)
PROTHROMBIN TIME: 13.1 SECONDS (ref 11.8–14.9)
RBC # BLD AUTO: 5 10*6/MM3 (ref 4.14–5.8)
SODIUM SERPL-SCNC: 143 MMOL/L (ref 136–145)
WBC NRBC COR # BLD: 10.41 10*3/MM3 (ref 3.4–10.8)
WHOLE BLOOD HOLD COAG: NORMAL
WHOLE BLOOD HOLD SPECIMEN: NORMAL

## 2023-01-01 PROCEDURE — 36415 COLL VENOUS BLD VENIPUNCTURE: CPT | Performed by: REGISTERED NURSE

## 2023-01-01 PROCEDURE — 80061 LIPID PANEL: CPT | Performed by: INTERNAL MEDICINE

## 2023-01-01 PROCEDURE — 83036 HEMOGLOBIN GLYCOSYLATED A1C: CPT | Performed by: INTERNAL MEDICINE

## 2023-01-01 PROCEDURE — 84145 PROCALCITONIN (PCT): CPT | Performed by: INTERNAL MEDICINE

## 2023-01-01 PROCEDURE — 83735 ASSAY OF MAGNESIUM: CPT | Performed by: EMERGENCY MEDICINE

## 2023-01-01 PROCEDURE — 80053 COMPREHEN METABOLIC PANEL: CPT | Performed by: REGISTERED NURSE

## 2023-01-01 PROCEDURE — 83880 ASSAY OF NATRIURETIC PEPTIDE: CPT | Performed by: EMERGENCY MEDICINE

## 2023-01-01 PROCEDURE — 85610 PROTHROMBIN TIME: CPT | Performed by: REGISTERED NURSE

## 2023-01-01 PROCEDURE — 99285 EMERGENCY DEPT VISIT HI MDM: CPT

## 2023-01-01 PROCEDURE — 85025 COMPLETE CBC W/AUTO DIFF WBC: CPT | Performed by: REGISTERED NURSE

## 2023-01-01 NOTE — Clinical Note
Level of Care: Remote Telemetry [26]   Diagnosis: Left sided numbness [179897]   Admitting Physician: AVINASH CASE [[id number]]   Attending Physician: AVINASH CASE [[id number]]   Bed Request Comments: COVID pending; FLU indeterminate   Certification: I Certify That Inpatient Hospital Services Are Medically Necessary For Greater Than 2 Midnights

## 2023-01-02 ENCOUNTER — APPOINTMENT (OUTPATIENT)
Dept: CT IMAGING | Facility: HOSPITAL | Age: 63
DRG: 64 | End: 2023-01-02
Payer: MEDICARE

## 2023-01-02 ENCOUNTER — APPOINTMENT (OUTPATIENT)
Dept: GENERAL RADIOLOGY | Facility: HOSPITAL | Age: 63
DRG: 64 | End: 2023-01-02
Payer: MEDICARE

## 2023-01-02 ENCOUNTER — APPOINTMENT (OUTPATIENT)
Dept: MRI IMAGING | Facility: HOSPITAL | Age: 63
DRG: 64 | End: 2023-01-02
Payer: MEDICARE

## 2023-01-02 PROBLEM — R20.0 LEFT SIDED NUMBNESS: Status: ACTIVE | Noted: 2023-01-02

## 2023-01-02 LAB
CHOLEST SERPL-MCNC: 201 MG/DL (ref 0–200)
D-LACTATE SERPL-SCNC: 0.5 MMOL/L (ref 0.5–2)
FLUAV AG NPH QL: ABNORMAL
FLUAV AG NPH QL: ABNORMAL
FLUBV AG NPH QL IA: ABNORMAL
FLUBV AG NPH QL IA: ABNORMAL
GLUCOSE BLDC GLUCOMTR-MCNC: 185 MG/DL (ref 70–99)
GLUCOSE BLDC GLUCOMTR-MCNC: 192 MG/DL (ref 70–99)
GLUCOSE BLDC GLUCOMTR-MCNC: 214 MG/DL (ref 70–99)
HBA1C MFR BLD: 8.5 % (ref 4.8–5.6)
HDLC SERPL-MCNC: 76 MG/DL (ref 40–60)
LDLC SERPL CALC-MCNC: 109 MG/DL (ref 0–100)
LDLC/HDLC SERPL: 1.41 {RATIO}
MAGNESIUM SERPL-MCNC: 2 MG/DL (ref 1.6–2.4)
NT-PROBNP SERPL-MCNC: 429.3 PG/ML (ref 0–900)
PROCALCITONIN SERPL-MCNC: 0.05 NG/ML (ref 0–0.25)
QT INTERVAL: 373 MS
SARS-COV-2 RNA PNL SPEC NAA+PROBE: NOT DETECTED
TRIGL SERPL-MCNC: 90 MG/DL (ref 0–150)
VLDLC SERPL-MCNC: 16 MG/DL (ref 5–40)

## 2023-01-02 PROCEDURE — 82962 GLUCOSE BLOOD TEST: CPT

## 2023-01-02 PROCEDURE — 25010000002 AZITHROMYCIN PER 500 MG: Performed by: EMERGENCY MEDICINE

## 2023-01-02 PROCEDURE — 70498 CT ANGIOGRAPHY NECK: CPT

## 2023-01-02 PROCEDURE — 83605 ASSAY OF LACTIC ACID: CPT | Performed by: EMERGENCY MEDICINE

## 2023-01-02 PROCEDURE — 87186 SC STD MICRODIL/AGAR DIL: CPT | Performed by: EMERGENCY MEDICINE

## 2023-01-02 PROCEDURE — 71045 X-RAY EXAM CHEST 1 VIEW: CPT

## 2023-01-02 PROCEDURE — 88312 SPECIAL STAINS GROUP 1: CPT | Performed by: EMERGENCY MEDICINE

## 2023-01-02 PROCEDURE — 87077 CULTURE AEROBIC IDENTIFY: CPT | Performed by: EMERGENCY MEDICINE

## 2023-01-02 PROCEDURE — 70496 CT ANGIOGRAPHY HEAD: CPT

## 2023-01-02 PROCEDURE — 93010 ELECTROCARDIOGRAM REPORT: CPT | Performed by: SPECIALIST

## 2023-01-02 PROCEDURE — 93005 ELECTROCARDIOGRAM TRACING: CPT | Performed by: EMERGENCY MEDICINE

## 2023-01-02 PROCEDURE — 87040 BLOOD CULTURE FOR BACTERIA: CPT | Performed by: EMERGENCY MEDICINE

## 2023-01-02 PROCEDURE — 99222 1ST HOSP IP/OBS MODERATE 55: CPT | Performed by: PSYCHIATRY & NEUROLOGY

## 2023-01-02 PROCEDURE — 87147 CULTURE TYPE IMMUNOLOGIC: CPT | Performed by: EMERGENCY MEDICINE

## 2023-01-02 PROCEDURE — U0005 INFEC AGEN DETEC AMPLI PROBE: HCPCS | Performed by: EMERGENCY MEDICINE

## 2023-01-02 PROCEDURE — 99223 1ST HOSP IP/OBS HIGH 75: CPT | Performed by: INTERNAL MEDICINE

## 2023-01-02 PROCEDURE — 0042T HC CT CEREBRAL PERFUSION W/WO CONTRAST: CPT

## 2023-01-02 PROCEDURE — 70551 MRI BRAIN STEM W/O DYE: CPT

## 2023-01-02 PROCEDURE — 87804 INFLUENZA ASSAY W/OPTIC: CPT | Performed by: EMERGENCY MEDICINE

## 2023-01-02 PROCEDURE — 87150 DNA/RNA AMPLIFIED PROBE: CPT | Performed by: EMERGENCY MEDICINE

## 2023-01-02 PROCEDURE — U0004 COV-19 TEST NON-CDC HGH THRU: HCPCS | Performed by: EMERGENCY MEDICINE

## 2023-01-02 PROCEDURE — 71250 CT THORAX DX C-: CPT

## 2023-01-02 PROCEDURE — 25010000002 CEFTRIAXONE PER 250 MG: Performed by: EMERGENCY MEDICINE

## 2023-01-02 PROCEDURE — 0 IOPAMIDOL PER 1 ML: Performed by: EMERGENCY MEDICINE

## 2023-01-02 RX ORDER — CEFTRIAXONE SODIUM 1 G/50ML
1 INJECTION, SOLUTION INTRAVENOUS ONCE
Status: COMPLETED | OUTPATIENT
Start: 2023-01-02 | End: 2023-01-02

## 2023-01-02 RX ORDER — NICOTINE POLACRILEX 4 MG
15 LOZENGE BUCCAL
Status: DISCONTINUED | OUTPATIENT
Start: 2023-01-02 | End: 2023-01-07 | Stop reason: HOSPADM

## 2023-01-02 RX ORDER — SODIUM CHLORIDE 0.9 % (FLUSH) 0.9 %
10 SYRINGE (ML) INJECTION AS NEEDED
Status: DISCONTINUED | OUTPATIENT
Start: 2023-01-02 | End: 2023-01-07 | Stop reason: HOSPADM

## 2023-01-02 RX ORDER — DEXTROSE MONOHYDRATE 25 G/50ML
25 INJECTION, SOLUTION INTRAVENOUS
Status: DISCONTINUED | OUTPATIENT
Start: 2023-01-02 | End: 2023-01-07 | Stop reason: HOSPADM

## 2023-01-02 RX ORDER — ATORVASTATIN CALCIUM 40 MG/1
80 TABLET, FILM COATED ORAL NIGHTLY
Status: DISCONTINUED | OUTPATIENT
Start: 2023-01-02 | End: 2023-01-07 | Stop reason: HOSPADM

## 2023-01-02 RX ORDER — ACETAMINOPHEN 325 MG/1
650 TABLET ORAL EVERY 4 HOURS PRN
Status: DISCONTINUED | OUTPATIENT
Start: 2023-01-02 | End: 2023-01-07 | Stop reason: HOSPADM

## 2023-01-02 RX ORDER — DEXTROSE MONOHYDRATE 25 G/50ML
25 INJECTION, SOLUTION INTRAVENOUS
Status: DISCONTINUED | OUTPATIENT
Start: 2023-01-02 | End: 2023-01-02

## 2023-01-02 RX ORDER — BENZONATATE 200 MG/1
200 CAPSULE ORAL 3 TIMES DAILY PRN
COMMUNITY

## 2023-01-02 RX ORDER — INSULIN LISPRO 100 [IU]/ML
75 INJECTION, SOLUTION INTRAVENOUS; SUBCUTANEOUS DAILY
COMMUNITY
Start: 2022-09-09

## 2023-01-02 RX ORDER — SODIUM CHLORIDE 9 MG/ML
40 INJECTION, SOLUTION INTRAVENOUS AS NEEDED
Status: DISCONTINUED | OUTPATIENT
Start: 2023-01-02 | End: 2023-01-07 | Stop reason: HOSPADM

## 2023-01-02 RX ORDER — CLOPIDOGREL BISULFATE 75 MG/1
75 TABLET ORAL DAILY
Status: DISCONTINUED | OUTPATIENT
Start: 2023-01-02 | End: 2023-01-02

## 2023-01-02 RX ORDER — SODIUM CHLORIDE 0.9 % (FLUSH) 0.9 %
10 SYRINGE (ML) INJECTION EVERY 12 HOURS SCHEDULED
Status: DISCONTINUED | OUTPATIENT
Start: 2023-01-02 | End: 2023-01-07 | Stop reason: HOSPADM

## 2023-01-02 RX ORDER — ENOXAPARIN SODIUM 100 MG/ML
40 INJECTION SUBCUTANEOUS DAILY
Status: DISCONTINUED | OUTPATIENT
Start: 2023-01-02 | End: 2023-01-07 | Stop reason: HOSPADM

## 2023-01-02 RX ORDER — LISINOPRIL 20 MG/1
20 TABLET ORAL 2 TIMES DAILY
COMMUNITY

## 2023-01-02 RX ORDER — INSULIN LISPRO 100 [IU]/ML
2-7 INJECTION, SOLUTION INTRAVENOUS; SUBCUTANEOUS
Status: DISCONTINUED | OUTPATIENT
Start: 2023-01-02 | End: 2023-01-02

## 2023-01-02 RX ORDER — ASPIRIN 81 MG/1
81 TABLET ORAL DAILY
Status: DISCONTINUED | OUTPATIENT
Start: 2023-01-02 | End: 2023-01-07 | Stop reason: HOSPADM

## 2023-01-02 RX ORDER — NITROGLYCERIN 0.4 MG/1
0.4 TABLET SUBLINGUAL
Status: DISCONTINUED | OUTPATIENT
Start: 2023-01-02 | End: 2023-01-07 | Stop reason: HOSPADM

## 2023-01-02 RX ORDER — TRAZODONE HYDROCHLORIDE 50 MG/1
50 TABLET ORAL AS NEEDED
COMMUNITY

## 2023-01-02 RX ORDER — NICOTINE POLACRILEX 4 MG
15 LOZENGE BUCCAL
Status: DISCONTINUED | OUTPATIENT
Start: 2023-01-02 | End: 2023-01-02

## 2023-01-02 RX ADMIN — IOPAMIDOL 80 ML: 755 INJECTION, SOLUTION INTRAVENOUS at 01:43

## 2023-01-02 RX ADMIN — ACETAMINOPHEN 650 MG: 325 TABLET ORAL at 08:31

## 2023-01-02 RX ADMIN — AZITHROMYCIN 500 MG: 500 INJECTION, POWDER, LYOPHILIZED, FOR SOLUTION INTRAVENOUS at 04:36

## 2023-01-02 RX ADMIN — Medication 10 ML: at 21:35

## 2023-01-02 RX ADMIN — ATORVASTATIN CALCIUM 80 MG: 40 TABLET, FILM COATED ORAL at 21:34

## 2023-01-02 RX ADMIN — CEFTRIAXONE SODIUM 1 G: 1 INJECTION, SOLUTION INTRAVENOUS at 03:47

## 2023-01-02 RX ADMIN — ASPIRIN 81 MG: 81 TABLET, COATED ORAL at 10:34

## 2023-01-02 RX ADMIN — Medication 10 ML: at 10:35

## 2023-01-02 RX ADMIN — ACETAMINOPHEN 650 MG: 325 TABLET ORAL at 17:55

## 2023-01-02 RX ADMIN — Medication: at 16:54

## 2023-01-02 RX ADMIN — CLOPIDOGREL BISULFATE 75 MG: 75 TABLET ORAL at 10:34

## 2023-01-02 NOTE — PLAN OF CARE
Goal Outcome Evaluation:   Droplet isolation r/t inconclusive flu test results.  Spouse at bedside.

## 2023-01-02 NOTE — PROGRESS NOTES
[facility] Hospitalist Progress Note  Date: 2023  Patient Name: Tyrone Garcia  : 1960  MRN: [id number]  Date of admission: 2023      Subjective   Subjective     Chief Complaint:   Left-sided weakness    Summary:   Tyrone Garcia is a 62-year-old male with a history of COPD, diabetes, hypertension patient presents with left-sided weakness.  Patient started with a coughing fit and headache day prior to admission.  Patient states he woke up with heaviness on his left side.  Patient's influenza came back as indeterminate, therefore this indicates patient is positive for both influenza A and influenza B.  Patient's chest x-ray represents multifocal pneumonia and a CT of his carotids shows large inclusions therefore CT chest was ordered.  Patient's MRI brain shows subacute infarcts on the right frontal region, patient found to have 78% stenosis of the proximal right internal carotid artery.  Neurology was consulted on the case, vascular surgery was consulted on the case.    Interval Followup:   Patient's left-sided weakness is persistent.  Discussed with radiologist findings on MRI, as well as stenosis to right internal carotid.  Neurology is consulted vascular surgery.  Patient has been started on antiplatelet therapy.  CT chest was ordered, patient's CT neck shows pulmonary nodules partially visualized in the upper lungs.  Patient states he recently underwent bronchoscopy at the [facility] with his pulmonologist.  He does not know the results    Review of Systems   All systems were reviewed and negative except for: Left-sided weakness    Objective   Objective     Vitals:   Temp:  [97.7 °F (36.5 °C)-98.7 °F (37.1 °C)] 97.7 °F (36.5 °C)  Heart Rate:  [] 101  Resp:  [18-22] 20  BP: (140-183)/() 171/88  Flow (L/min):  [2] 2  Physical Exam    Constitutional: Awake, alert, no acute distress   Eyes: Pupils equal, sclerae anicteric, no conjunctival  injection   HENT: NCAT, mucous membranes moist   Neck: Supple, no thyromegaly, no lymphadenopathy, trachea midline   Respiratory: Diffuse expiratory wheezing heard throughout   Cardiovascular: RRR, no murmurs, rubs, or gallops, palpable pedal pulses bilaterally   Gastrointestinal: Positive bowel sounds, soft, nontender, nondistended   Musculoskeletal: No bilateral ankle edema, no clubbing or cyanosis to extremities   Psychiatric: Appropriate affect, cooperative   Neurologic: Oriented x 3, cranial nerves grossly intact, speech clear, no pronator drift, strength appears symmetric throughout   Skin: No rashes     Result Review    Result Review:  I have personally reviewed the results from 1/2/2023 and agree with these findings:  [x]  Laboratory  [x]  Microbiology  [x]  Radiology  [x]  EKG/Telemetry   [x]  Cardiology/Vascular   []  Pathology  []  Old records  []  Other:    Assessment & Plan   Assessment / Plan     Assessment/Plan:  Acute stroke, ischemic  Right carotid artery stenosis  Pulmonary nodules/pulmonary cysts  Influenza A, influenza B positive   History of COPD  Diabetes  Hypertension    Plan:  Patient admitted to hospital for further care management  Neurology consulted, appreciate assistance  Vascular surgery consulted, appreciate assistance  Patient remains in isolation for influenza positive status  Indeterminate indicates patient is positive for both a and B influenza  CT of patient's chest ordered given pulmonary nodule/cyst seen  Patient started on aspirin  High intensity statin  A1c and lipid panel ordered  Will allow for permissive hypertension  Patient has his home insulin pump, will closely monitor CBGs while inpatient    Discussed plan with RN.    DVT prophylaxis:  Medical DVT prophylaxis orders are present.    CODE STATUS:   Code Status (Patient has no pulse and is not breathing): CPR (Attempt to Resuscitate)  Medical Interventions (Patient has pulse or is breathing): Full Support

## 2023-01-02 NOTE — ED PROVIDER NOTES
"Time: 8:58 PM EST  Date of encounter:  2023  Independent Historian/Clinical History and Information was obtained by:   Patient  Chief Complaint   Patient presents with   • Cough     Pt states he has been coughing and last PM was coughing so much his head hurts and still has a headache today       History is limited by: N/A    History of Present Illness:  Patient is a 62 y.o. year old male who presents to the emergency department for evaluation of \"left arm not working right\".  Patient states he had a coughing fit yesterday during which he developed right frontal headache.  Headache is still present.  When he woke up this morning his left arm was not functioning properly and it felt heavy and weak.  The patient also notes numbness in the hand.   he was seen at [facility] today where he had testing and MRI done.  Patient states that he was advised to be admitted or go to [facility] which she refused and stated that he wanted to come to [place] ED.    HPI    Patient Care Team  Primary Care Provider: Mana Stafford APRN    Past Medical History:     No Known Allergies  Past Medical History:   Diagnosis Date   • COPD (chronic obstructive pulmonary disease) (HCC)    • Diabetes (HCC)     TYPE 1   • Hypertension      Past Surgical History:   Procedure Laterality Date   • ABDOMINAL HERNIA REPAIR     • EYE SURGERY      X 4 FOR BLEEDING BEHIND BILATERAL EYES     History reviewed. No pertinent family history.    Home Medications:  Prior to Admission medications    Not on File        Social History:   Social History     Tobacco Use   • Smoking status: Former     Packs/day: 3.00     Years: 40.00     Pack years: 120.00     Types: Cigarettes     Quit date: 2022     Years since quittin.9   Substance Use Topics   • Alcohol use: Never   • Drug use: Never         Review of Systems:  Review of Systems   Constitutional: Negative for chills, diaphoresis and fever.   HENT: Negative for congestion, postnasal " "drip, rhinorrhea and sore throat.    Eyes: Negative for photophobia.   Respiratory: Negative for cough, chest tightness and shortness of breath.    Cardiovascular: Negative for chest pain, palpitations and leg swelling.   Gastrointestinal: Negative for abdominal pain, diarrhea, nausea and vomiting.   Genitourinary: Negative for difficulty urinating, dysuria, flank pain, frequency, hematuria and urgency.   Musculoskeletal: Negative for neck pain and neck stiffness.   Skin: Negative for pallor and rash.   Neurological: Positive for weakness, numbness and headaches. Negative for dizziness and syncope.        Left arm weakness   Hematological: Negative for adenopathy. Does not bruise/bleed easily.   Psychiatric/Behavioral: Negative.         Physical Exam:  /79 (BP Location: Right arm, Patient Position: Lying)   Pulse 91   Temp 98.3 °F (36.8 °C) (Oral)   Resp 20   Ht 180.3 cm (71\")   Wt 62.7 kg (138 lb 3.7 oz)   SpO2 99%   BMI 19.28 kg/m²     Physical Exam  Vitals and nursing note reviewed.   Constitutional:       General: He is not in acute distress.     Appearance: Normal appearance. He is not ill-appearing, toxic-appearing or diaphoretic.   HENT:      Head: Normocephalic and atraumatic.      Mouth/Throat:      Mouth: Mucous membranes are moist.   Eyes:      General: No visual field deficit.     Extraocular Movements: Extraocular movements intact.      Conjunctiva/sclera: Conjunctivae normal.      Pupils: Pupils are equal, round, and reactive to light.   Neck:      Vascular: No carotid bruit.   Cardiovascular:      Rate and Rhythm: Normal rate and regular rhythm.      Pulses: Normal pulses.           Carotid pulses are 2+ on the right side and 2+ on the left side.       Radial pulses are 2+ on the right side and 2+ on the left side.        Femoral pulses are 2+ on the right side and 2+ on the left side.       Popliteal pulses are 2+ on the right side and 2+ on the left side.        Dorsalis pedis pulses " are 2+ on the right side and 2+ on the left side.        Posterior tibial pulses are 2+ on the right side and 2+ on the left side.      Heart sounds: Normal heart sounds. No murmur heard.  Pulmonary:      Effort: Pulmonary effort is normal. No accessory muscle usage, respiratory distress or retractions.      Breath sounds: Decreased air movement present. Examination of the right-upper field reveals wheezing. Examination of the left-upper field reveals wheezing. Decreased breath sounds and wheezing present. No rhonchi or rales.   Abdominal:      General: Abdomen is flat. There is no distension.      Palpations: Abdomen is soft. There is no mass.      Tenderness: There is no abdominal tenderness. There is no right CVA tenderness, left CVA tenderness, guarding or rebound.      Comments: No rigidity   Musculoskeletal:         General: No swelling, tenderness or deformity.      Cervical back: Neck supple. No tenderness.      Right lower leg: No edema.      Left lower leg: No edema.   Skin:     General: Skin is warm and dry.      Capillary Refill: Capillary refill takes less than 2 seconds.      Coloration: Skin is not cyanotic, jaundiced or pale.      Findings: No erythema.   Neurological:      Mental Status: He is alert and oriented to person, place, and time. Mental status is at baseline.      Cranial Nerves: No cranial nerve deficit, dysarthria or facial asymmetry.      Sensory: Sensory deficit present.      Motor: Motor function is intact. No weakness or pronator drift.      Coordination: Coordination abnormal.      Comments: NIH Stroke Scale/Score (NIHSS) - MDCalc  Calculated on Jan 02 2023 2:55 AM  2 points -> NIH Stroke Scale   Psychiatric:         Attention and Perception: Attention and perception normal.         Mood and Affect: Mood normal.         Behavior: Behavior normal.                  Procedures:  Procedures      Medical Decision Making:        The following orders were placed and all results were  independently analyzed by me:  Orders Placed This Encounter   Procedures   • COVID-19,APTIMA PANTHER(LINETTE),[facility]/ MARISELA, NP/OP SWAB IN UTM/VTM/SALINE TRANSPORT MEDIA,24 HR TAT - Swab, Nasal Cavity   • Influenza Antigen, Rapid - Swab, Nasopharynx   • Influenza Antigen, Rapid - Swab, Nasopharynx   • Blood Culture - Blood,   • Blood Culture - Blood,   • Blood Culture ID, PCR - Blood,   • Blood Culture - Blood,   • Blood Culture - Blood,   • XR Chest 1 View   • CT Angiogram Head w AI Analysis of LVO   • CT Angiogram Neck   • CT CEREBRAL PERFUSION WITH & WITHOUT CONTRAST   • MRI Brain Without Contrast   • CT Chest Without Contrast Diagnostic   • [place] Draw   • Comprehensive Metabolic Panel   • Protime-INR   • CBC Auto Differential   • Magnesium   • BNP   • Lactic Acid, Plasma   • Procalcitonin   • Lipid Panel   • Hemoglobin A1c   • CBC (No Diff)   • Basic Metabolic Panel   • Magnesium   • Vancomycin, Random   • CBC (No Diff)   • Basic Metabolic Panel   • Obtain medical records   • Notify Provider if ACLS Protocol Activated   • Weigh Patient   • Saline Lock & Maintain IV Access   • Discharge Follow-up with PCP   • Discharge Follow-up with Specialty: Follow-up with infectious diseases as scheduled for chronic cavitary pulmonary histoplasmosis   • Discharge Follow-up with Specified Provider: Follow-up with vascular surgery as scheduled on 1/27/2023 for possible endarterectomy   • Gradually Increase Activity Until at Pre-Hospitalization Level   • Diet: Consistent Carbohydrate   • Resume Oxygen Therapy After Discharge   • Inpatient Neurology Consult Stroke   • Inpatient Vascular Surgery Consult   • Patient Isolation Droplet   • OT Plan of Care Cert / Re-Cert   • PT Consult: Eval & Treat Functional Mobility Below Baseline   • SLP Consult: Eval & Treat Other; per stroke protocol   • POC Glucose Once   • POC Glucose Once   • POC Glucose Once   • POC Glucose Once   • POC Glucose Once   • POC Glucose Once   • POC Glucose Once    • POC Glucose Once   • POC Glucose Once   • POC Glucose Once   • POC Glucose Once   • POC Glucose Once   • POC Glucose Once   • POC Glucose Once   • POC Glucose Once   • POC Glucose Once   • POC Glucose Once   • POC Glucose Once   • POC Glucose Once   • POC Glucose Once   • POC Glucose Once   • POC Glucose Once   • POC Glucose Once   • POC Glucose Once   • ECG 12 Lead Stroke Evaluation   • SCANNED - TELEMETRY     • SCANNED - TELEMETRY     • SCANNED - TELEMETRY     • SCANNED - TELEMETRY     • SCANNED - TELEMETRY     • SCANNED - TELEMETRY     • SCANNED - TELEMETRY     • SCANNED - TELEMETRY     • Adult Transthoracic Echo Complete W/ Cont if Necessary Per Protocol   • Inpatient Admission   • Discharge patient   • CBC & Differential   • Green Top (Gel)   • Lavender Top   • Gold Top - SST   • Light Blue Top       Medications Given in the Emergency Department:  Medications   iopamidol (ISOVUE-370) 76 % injection 150 mL (80 mL Intravenous Given 1/2/23 0143)   cefTRIAXone (ROCEPHIN) IVPB 1 g (0 g Intravenous Stopped 1/2/23 0436)   AZITHROMYCIN 500 MG/250 ML 0.9% NS IVPB (vial-mate) (0 mg Intravenous Stopped 1/2/23 0559)   vancomycin 1250 mg/250 mL 0.9% NS IVPB ([facility]) (1,250 mg Intravenous New Bag 1/3/23 0240)   potassium chloride (MICRO-K) CR capsule 30 mEq (30 mEq Oral Given 1/3/23 1453)   furosemide (LASIX) injection 40 mg (40 mg Intravenous Given 1/5/23 1624)        ED Course:    The patient was initially evaluated in the triage area where orders were placed. The patient was later dispositioned by Cristian Castellano DO.      The patient was advised to stay for completion of workup which includes but is not limited to communication of labs and radiological results, reassessment and plan. The patient was advised that leaving prior to disposition by a provider could result in critical findings that are not communicated to the patient.     ED Course as of 01/10/23 0253   Mon Jan 02, 2023   0102 EKG:    Rhythm: Sinus rhythm  Rate:  85  Intervals: Normal RI and QT interval  T-wave: Baseline artifact which obscures detail there is nonspecific T wave flattening  ST Segment: Again baseline artifact obscures detail.  There may be nonspecific ST segment elevation in V3, II, 3, aVF.  However, there is no obvious pathological ST elevation or ST depression to suggest acute myocardial infarction    EKG Comparison: EKG is changed from EKG performed July 2, 2019 as the EKG has sinus tachycardia.  However, the nonspecific ST segments appear to be unchanged    Interpreted by me   [SD]      ED Course User Index  [SD] Cristian Castellano DO       Labs:    Lab Results (last 24 hours)     ** No results found for the last 24 hours. **           Imaging:    No Radiology Exams Resulted Within Past 24 Hours      Differential Diagnosis and Discussion:    MDM  Number of Diagnoses or Management Options  Chronic obstructive pulmonary disease, unspecified COPD type (HCC)  Left sided numbness  Pneumonia of both lungs due to infectious organism, unspecified part of lung  TIA (transient ischemic attack)  Uncontrolled hypertension  Diagnosis management comments: The patient would not be a TNKase candidate due to the fact that he woke up with symptoms.  The time of onset would be greater than 4 and half hours..  The patient is not a candidate for arterial invention as there is no evidence of LVO.    ABCD² Score for TIA - MDCalc  Calculated on Jan 02 2023 3:15 AM  7 points -> Per the validation study, 6-7 points: High Risk 2-Day Stroke Risk: 8.1% 7-Day Stroke Risk: 11.7% 90-Day Stroke Risk: 17.8%    The patient will be admitted to the hospital due to the high ABCD 2 score and further evaluation of the strokelike symptoms    The patient's chemistry essentially was without abnormality.  The patient's CBC was normal.  The patient's pro time and INR was normal.  The patient had a normal magnesium.  The patient's BNP was normal.  The patient's procalcitonin was normal.  Patient had  blood cultures but were pending at the time of admission.  The patient's flu were indeterminate and will be repeated.  The patient's CT perfusion demonstrated no convincing evidence for large vessel acute brain infarct or acute cerebral hyperperfusion.  Patient CT angiogram of the neck demonstrated carotid artery stenosis but no evidence of large vessel occlusion.  Patient did have multiple pulmonary nodules.  I discussed this with him.  The patient understands importance of the follow-up for the pulmonary nodules to rule out possible malignant disease or other pathology.  The patient will seek follow-up in the pulmonary nodules on an outpatient basis.  Again I have discussed this with him in detail.  Patient's EKG demonstrated normal sinus rhythm rate of 85.  Patient's chest x-ray demonstrates new bilateral infiltrates.  Certainly could represent multifocal pneumonia.  The patient will be started on Rocephin and Zithromax.    Sepsis was not present in the emergency department or on arrival. This is supported as the patient does not either meet two out of the four SIRS criteria or has an obvious bacterial infection.      SIRS criteria considered:   1.                     Temperature > 100.4 or <98.6    2.                     Heart Rate > 90    3.                     Respiratory Rate > 22    4.                     WBC > 12K or <4K.        Amount and/or Complexity of Data Reviewed  Clinical lab tests: reviewed  Tests in the radiology section of CPT®: reviewed  Tests in the medicine section of CPT®: reviewed  Decide to obtain previous medical records or to obtain history from someone other than the patient: yes (I reviewed the patient's CT scan performed earlier today at [facility].  CAT scan demonstrates no evidence of acute intracranial hemorrhage.  Age and indeterminate white matter changes in the frontal parietal lobe approaching the vertex on the right.  Probable prominent perivascular space  along the posterior limb of the internal capsule on the right, mild generalized atrophy.  This was read by Cody Chu)  Discuss the patient with other providers: yes (I discussed the case with the hospitalist to evaluate the patient emergency room admit the patient to the hospital)         Patient is independent, reliable, and has access to care.       Disposition and Care Coordination:    Admit:   Through independent evaluation of the patient's history, physical, and imperical data, the patient meets criteria for observation/admission to the hospital.    Final diagnoses:   Left sided numbness   Uncontrolled hypertension   TIA (transient ischemic attack)   Chronic obstructive pulmonary disease, unspecified COPD type (HCC)   Pneumonia of both lungs due to infectious organism, unspecified part of lung        ED Disposition     ED Disposition   Decision to Admit    Condition   --    Comment   Level of Care: Telemetry [5]   Diagnosis: Left sided numbness [641649]   Admitting Physician: AVINASH CASE [[id number]]   Attending Physician: AVINASH CASE [[id number]]   Bed Request Comments: COVID pending; FLU indeterminate   Certification: I Certify That Inpatient Hospital Services Are Medically Necessary For Greater Than 2 Midnights               This medical record created using voice recognition software.           Cristian Castellano DO  01/10/23 0253

## 2023-01-02 NOTE — H&P
[facility]   HISTORY AND PHYSICAL    Patient Name: Tyrone Garcia  : 1960  MRN: [id number]  Primary Care Physician: Mana Stafford APRN  Date of admission: 2023    Subjective   Subjective     Chief Complaint: Left-sided weakness    History of Present Illness  62-year-old man with COPD, DM (insulin pump), HTN presents with left-sided weakness.  He reports that he had a coughing fit and headache yesterday.  And this morning he woke with a heaviness in his left extremities.  He seems to gone to [facility] where [facility] was [facility] out [place] [facility]  However he insisted on coming to his bedtime.    In the ED: Vitals notable for borderline tachycardia and tachypnea.    Labs largely unremarkable:  -Influenza labs twice came back as indeterminate  -COVID: Not detected    CXR:  New bilateral infiltrates are seen.  The findings may represent infectious multifocal   pneumonia.       CT cerebral perfusion study:  There is no convincing CTP (or CT perfusion) evidence for a large vessel acute brain infarct or acute the cerebral hypoperfusion.    CT angio head and neck: Pending      Review of Systems   Constitutional: Negative for chills, diaphoresis and fever.   HENT: Negative for congestion, postnasal drip, rhinorrhea and sore throat.    Eyes: Negative for photophobia.   Respiratory: Negative for cough, chest tightness and shortness of breath.    Cardiovascular: Negative for chest pain, palpitations and leg swelling.   Gastrointestinal: Negative for abdominal pain, diarrhea, nausea and vomiting.   Genitourinary: Negative for difficulty urinating, dysuria, flank pain, frequency, hematuria and urgency.   Musculoskeletal: Negative for neck pain and neck stiffness.   Skin: Negative for pallor and rash.   Neurological: Positive for weakness, numbness and headaches. Negative for dizziness and syncope.        Left arm weakness   Hematological: Negative for adenopathy. Does not bruise/bleed easily.    Psychiatric/Behavioral: Negative.    Personal History     Past Medical History:   Diagnosis Date   • COPD (chronic obstructive pulmonary disease) (HCC)    • Diabetes (HCC)     TYPE 1   • Hypertension        Past Surgical History:   Procedure Laterality Date   • ABDOMINAL HERNIA REPAIR     • EYE SURGERY      X 4 FOR BLEEDING BEHIND BILATERAL EYES       Family History: family history is not on file. Otherwise pertinent FHx was reviewed and not pertinent to current issue.    Social History:  reports that he quit smoking about 11 months ago. His smoking use included cigarettes. He does not have any smokeless tobacco history on file. He reports that he does not drink alcohol and does not use drugs.    Home Medications:         Allergies:  No Known Allergies    Objective    Objective     Vitals:  Temp:  [98.4 °F (36.9 °C)-98.7 °F (37.1 °C)] 98.7 °F (37.1 °C)  Heart Rate:  [] 88  Resp:  [18-22] 18  BP: (157-183)/(75-97) 183/89    Physical Exam  Constitutional:       General: He is not in acute distress.     Appearance: Normal appearance. He is not ill-appearing, toxic-appearing or diaphoretic.   HENT:      Head: Normocephalic and atraumatic.      Mouth/Throat:      Mouth: Mucous membranes are moist.   Eyes:      General: No visual field deficit.     Extraocular Movements: Extraocular movements intact.      Conjunctiva/sclera: Conjunctivae normal.      Pupils: Pupils are equal, round, and reactive to light.   Neck:      Vascular: No carotid bruit.   Cardiovascular:      Rate and Rhythm: Normal rate and regular rhythm.      Pulses: Normal pulses.         Heart sounds: Normal heart sounds. No murmur heard.  Pulmonary:      Effort: Pulmonary effort is normal. No accessory muscle usage, respiratory distress or retractions.      Breath sounds: Very mild diffuse wheezes  Abdominal:      General: Abdomen is flat. There is no distension.      Palpations: Abdomen is soft. There is no mass.      Tenderness: There is no  abdominal tenderness.  Musculoskeletal:         General: No swelling, tenderness or deformity.      Cervical back: Neck supple. No tenderness.      Right lower leg: No edema.      Left lower leg: No edema.   Skin:     General: Skin is warm and dry.      Capillary Refill: Capillary refill takes less than 2 seconds.      Coloration: Skin is not cyanotic, jaundiced or pale.      Findings: No erythema.   Neurological:      Mental Status: He is alert and oriented to person, place, and time. Mental status is at baseline.      Cranial Nerves: No cranial nerve deficit, dysarthria or facial asymmetry.      Sensory: Sensory deficit present.      Motor: Motor function is intact. No weakness or pronator drift.      Coordination: Coordination abnormal.   Psychiatric:         Attention and Perception: Attention and perception normal.         Mood and Affect: Mood normal.         Behavior: Behavior normal.   Result Review    Result Review:  I have personally reviewed the results from the time of this admission to 1/2/2023 06:53 EST and agree with these findings:  [x]  Laboratory list / accordion  [x]  Microbiology  [x]  Radiology  [x]  EKG/Telemetry   []  Cardiology/Vascular   []  Pathology  []  Old records  []  Other:  Most notable findings include: Labs and studies largely unremarkable thus far        Assessment & Plan   Assessment / Plan     Brief Patient Summary:  62-year-old man with COPD, DM, HTN presents with left-sided weakness.    Active Hospital Problems:  Active Hospital Problems    Diagnosis    • **Left sided numbness        Plan:   Left-sided weakness  -Imaging largely unremarkable thus far  [] Follow-up CTA head neck  [] Consider repeat MRI  [] Follow-up with neurology (not directly contacted yet)    Shortness of breath and cough  Question of pneumonia on CXR  -Has no white count  -Influenza labs: Indeterminate x2 (not entirely sure what that means)  -Lung exam is fairly unremarkable  -Received ceftriaxone  azithromycin in the ED  [] Assess if continuing antibiotics as needed    Chronic issues:  [] Once reconciled, reinstate home meds as appropriate  -Note: Has insulin pump      DVT prophylaxis: Lovenox    CODE STATUS:    Code Status and Medical Interventions:   Ordered at: 01/02/23 0416     Code Status (Patient has no pulse and is not breathing):    CPR (Attempt to Resuscitate)     Medical Interventions (Patient has pulse or is breathing):    Full Support       Admission Status:  I believe this patient meets inpatient status.    Home Stafford MD,

## 2023-01-02 NOTE — CONSULTS
[facility]   Neurology Consult Note    Patient Name: Tyrone Garcia  : 1960  MRN: [id number]  Primary Care Physician:  Mana Stafford APRN  Referring Physician: Trevon Bob MD  Date of admission: 2023    Subjective   Subjective     Reason for Consult/ Chief Complaint: Left-sided weakness    HPI:  Tyrone Garcia is a 62 y.o. male evaluated for left arm weakness.  He states that he woke up yesterday with his left hand and arm being weak.  He states that he went to the [facility] in [place] and he requested weight transferred to this facility.  He states that his never had a stroke in the past.  His face as well as his left leg was not affected.  He is complaining of right-sided headache behind his right eye and forehead.  I reviewed the MRI of the brain and CT angiogram showing a an acute infarct with small gyriform subacute infarct in the high right frontal region.  CT angiogram of the neck shows 70% stenosis in the right proximal internal carotid artery.  He was not taking antiplatelet agents.  He has type 1 diabetes.  He is hypertensive as well.  He stopped smoking 2 years ago.  He is not working at this time.  He is here today with his wife.  He has had retinal hemorrhage in the right eye and the left eye secondary to diabetes.    He states that he has had cough for the last 6 months and is being followed by pulmonary physician in [place] which he has had bronchoscopy in the past.    Personal History     Past Medical History:   Diagnosis Date   • COPD (chronic obstructive pulmonary disease) (HCC)    • Diabetes (HCC)     TYPE 1   • Hypertension        Past Surgical History:   Procedure Laterality Date   • ABDOMINAL HERNIA REPAIR     • EYE SURGERY      X 4 FOR BLEEDING BEHIND BILATERAL EYES       Family History: family history is not on file. Otherwise pertinent FHx was reviewed and not pertinent to current issue.    Social History:  reports that he quit smoking about 11  months ago. His smoking use included cigarettes. He has a 120.00 pack-year smoking history. He does not have any smokeless tobacco history on file. He reports that he does not drink alcohol and does not use drugs.    Home Medications:  Insulin Lispro, benzonatate, lisinopril, and traZODone    Allergies:  No Known Allergies    Objective    Objective     Vitals:   Temp:  [97.7 °F (36.5 °C)-98.7 °F (37.1 °C)] 97.7 °F (36.5 °C)  Heart Rate:  [] 101  Resp:  [18-22] 20  BP: (140-183)/() 171/88  Flow (L/min):  [2] 2    Physical Exam: He is alert, fluent, phasic, follows commands well.  Visual fields are full to confrontation but inconsistent on the right visual field.  EMs full directions gaze, facial strength is full, soft palate elevation and tongue are normal.  Fine finger movements are slightly impaired in the left hand.  There is no weakness.  Sensory is decreased to pinprick slightly in the left arm.  Reflexes symmetrically decreased in the biceps triceps, absent patellar's and ankles.  On station gait is able to ambulate around the room without difficulty.  Heart is regular rhythm normal in rate.  Carotids are clear.      Result Review    Result Review:  I have personally reviewed the results from the time of this admission to 1/2/2023 15:15 EST and agree with these findings:  [x]  Laboratory  []  Microbiology  [x]  Radiology  [x]  EKG/Telemetry   [x]  Cardiology/Vascular   []  Pathology  [x]  Old records  []  Other:      Assessment & Plan   Assessment / Plan   Active Hospital Problems:  Active Hospital Problems    Diagnosis    • **Left sided numbness          Plan: He has acute right brain infarction in the distribution of the right internal carotid artery.  I have consulted vascular surgery to see him in the morning and I discussed with him that he most likely will have carotid endarterectomy during this admission.  He will be started on single antiplatelet agent.  He was not on this prior to  admission.    Total time spent with the patient and coordinating patient care was 55 minutes.    electronically signed by Quang Collier MD, 01/02/23, 3:15 PM EST.

## 2023-01-03 ENCOUNTER — APPOINTMENT (OUTPATIENT)
Dept: CARDIOLOGY | Facility: HOSPITAL | Age: 63
DRG: 64 | End: 2023-01-03
Payer: MEDICARE

## 2023-01-03 LAB
ANION GAP SERPL CALCULATED.3IONS-SCNC: 7.9 MMOL/L (ref 5–15)
BACTERIA BLD CULT: ABNORMAL
BH CV VAS PRELIMINARY FINDINGS SCRIPTING: 1
BH CV XLRA MEAS LEFT CAROTID BULB EDV: 40 CM/SEC
BH CV XLRA MEAS LEFT CAROTID BULB PSV: 273 CM/SEC
BH CV XLRA MEAS LEFT DIST CCA EDV: 15 CM/SEC
BH CV XLRA MEAS LEFT DIST CCA PSV: 66 CM/SEC
BH CV XLRA MEAS LEFT DIST ICA EDV: 28 CM/SEC
BH CV XLRA MEAS LEFT DIST ICA PSV: 89 CM/SEC
BH CV XLRA MEAS LEFT MID ICA EDV: 25 CM/SEC
BH CV XLRA MEAS LEFT MID ICA PSV: 105 CM/SEC
BH CV XLRA MEAS LEFT PROX CCA EDV: 12 CM/SEC
BH CV XLRA MEAS LEFT PROX CCA PSV: 95 CM/SEC
BH CV XLRA MEAS LEFT PROX ECA EDV: 11 CM/SEC
BH CV XLRA MEAS LEFT PROX ECA PSV: 118 CM/SEC
BH CV XLRA MEAS LEFT PROX ICA EDV: 36 CM/SEC
BH CV XLRA MEAS LEFT PROX ICA PSV: 226 CM/SEC
BH CV XLRA MEAS LEFT VERTEBRAL A EDV: 21 CM/SEC
BH CV XLRA MEAS LEFT VERTEBRAL A PSV: 65 CM/SEC
BH CV XLRA MEAS RIGHT CAROTID BULB EDV: 31 CM/SEC
BH CV XLRA MEAS RIGHT CAROTID BULB PSV: 129 CM/SEC
BH CV XLRA MEAS RIGHT DIST CCA EDV: 16 CM/SEC
BH CV XLRA MEAS RIGHT DIST CCA PSV: 91 CM/SEC
BH CV XLRA MEAS RIGHT DIST ICA EDV: 24 CM/SEC
BH CV XLRA MEAS RIGHT DIST ICA PSV: 77 CM/SEC
BH CV XLRA MEAS RIGHT MID ICA EDV: 66 CM/SEC
BH CV XLRA MEAS RIGHT MID ICA PSV: 287 CM/SEC
BH CV XLRA MEAS RIGHT PROX CCA EDV: 16 CM/SEC
BH CV XLRA MEAS RIGHT PROX CCA PSV: 81 CM/SEC
BH CV XLRA MEAS RIGHT PROX ECA EDV: 12 CM/SEC
BH CV XLRA MEAS RIGHT PROX ECA PSV: 106 CM/SEC
BH CV XLRA MEAS RIGHT PROX ICA EDV: 69 CM/SEC
BH CV XLRA MEAS RIGHT PROX ICA PSV: 278 CM/SEC
BH CV XLRA MEAS RIGHT VERTEBRAL A EDV: 14 CM/SEC
BH CV XLRA MEAS RIGHT VERTEBRAL A PSV: 71 CM/SEC
BUN SERPL-MCNC: 20 MG/DL (ref 8–23)
BUN/CREAT SERPL: 28.6 (ref 7–25)
CALCIUM SPEC-SCNC: 9.1 MG/DL (ref 8.6–10.5)
CHLORIDE SERPL-SCNC: 102 MMOL/L (ref 98–107)
CO2 SERPL-SCNC: 31.1 MMOL/L (ref 22–29)
CREAT SERPL-MCNC: 0.7 MG/DL (ref 0.76–1.27)
DEPRECATED RDW RBC AUTO: 42.7 FL (ref 37–54)
EGFRCR SERPLBLD CKD-EPI 2021: 103.5 ML/MIN/1.73
ERYTHROCYTE [DISTWIDTH] IN BLOOD BY AUTOMATED COUNT: 13.2 % (ref 12.3–15.4)
GLUCOSE BLDC GLUCOMTR-MCNC: 131 MG/DL (ref 70–99)
GLUCOSE BLDC GLUCOMTR-MCNC: 166 MG/DL (ref 70–99)
GLUCOSE BLDC GLUCOMTR-MCNC: 184 MG/DL (ref 70–99)
GLUCOSE BLDC GLUCOMTR-MCNC: 199 MG/DL (ref 70–99)
GLUCOSE BLDC GLUCOMTR-MCNC: 203 MG/DL (ref 70–99)
GLUCOSE SERPL-MCNC: 41 MG/DL (ref 65–99)
HCT VFR BLD AUTO: 43.3 % (ref 37.5–51)
HGB BLD-MCNC: 14.4 G/DL (ref 13–17.7)
LEFT ARM BP: NORMAL MMHG
MAGNESIUM SERPL-MCNC: 2 MG/DL (ref 1.6–2.4)
MAXIMAL PREDICTED HEART RATE: 157 BPM
MCH RBC QN AUTO: 29.6 PG (ref 26.6–33)
MCHC RBC AUTO-ENTMCNC: 33.3 G/DL (ref 31.5–35.7)
MCV RBC AUTO: 88.9 FL (ref 79–97)
PLATELET # BLD AUTO: 285 10*3/MM3 (ref 140–450)
PMV BLD AUTO: 8.7 FL (ref 6–12)
POTASSIUM SERPL-SCNC: 3.7 MMOL/L (ref 3.5–5.2)
RBC # BLD AUTO: 4.87 10*6/MM3 (ref 4.14–5.8)
RIGHT ARM BP: NORMAL MMHG
SODIUM SERPL-SCNC: 141 MMOL/L (ref 136–145)
STRESS TARGET HR: 133 BPM
WBC NRBC COR # BLD: 7.77 10*3/MM3 (ref 3.4–10.8)

## 2023-01-03 PROCEDURE — 25010000002 ENOXAPARIN PER 10 MG: Performed by: INTERNAL MEDICINE

## 2023-01-03 PROCEDURE — 93880 EXTRACRANIAL BILAT STUDY: CPT | Performed by: SURGERY

## 2023-01-03 PROCEDURE — 80048 BASIC METABOLIC PNL TOTAL CA: CPT | Performed by: INTERNAL MEDICINE

## 2023-01-03 PROCEDURE — 85027 COMPLETE CBC AUTOMATED: CPT | Performed by: INTERNAL MEDICINE

## 2023-01-03 PROCEDURE — 99222 1ST HOSP IP/OBS MODERATE 55: CPT | Performed by: SURGERY

## 2023-01-03 PROCEDURE — 82962 GLUCOSE BLOOD TEST: CPT

## 2023-01-03 PROCEDURE — 25010000002 VANCOMYCIN 5 G RECONSTITUTED SOLUTION: Performed by: PHYSICIAN ASSISTANT

## 2023-01-03 PROCEDURE — 93880 EXTRACRANIAL BILAT STUDY: CPT

## 2023-01-03 PROCEDURE — 99233 SBSQ HOSP IP/OBS HIGH 50: CPT | Performed by: FAMILY MEDICINE

## 2023-01-03 PROCEDURE — 83735 ASSAY OF MAGNESIUM: CPT | Performed by: INTERNAL MEDICINE

## 2023-01-03 PROCEDURE — 97161 PT EVAL LOW COMPLEX 20 MIN: CPT

## 2023-01-03 PROCEDURE — 87040 BLOOD CULTURE FOR BACTERIA: CPT | Performed by: PHYSICIAN ASSISTANT

## 2023-01-03 RX ORDER — POTASSIUM CHLORIDE 750 MG/1
30 CAPSULE, EXTENDED RELEASE ORAL
Status: COMPLETED | OUTPATIENT
Start: 2023-01-03 | End: 2023-01-03

## 2023-01-03 RX ORDER — ALBUTEROL SULFATE 2.5 MG/3ML
2.5 SOLUTION RESPIRATORY (INHALATION)
Status: DISCONTINUED | OUTPATIENT
Start: 2023-01-03 | End: 2023-01-07 | Stop reason: HOSPADM

## 2023-01-03 RX ORDER — OSELTAMIVIR PHOSPHATE 75 MG/1
75 CAPSULE ORAL EVERY 12 HOURS SCHEDULED
Status: DISCONTINUED | OUTPATIENT
Start: 2023-01-03 | End: 2023-01-07 | Stop reason: HOSPADM

## 2023-01-03 RX ADMIN — Medication 10 ML: at 09:36

## 2023-01-03 RX ADMIN — OSELTAMIVIR PHOSPHATE 75 MG: 75 CAPSULE ORAL at 20:32

## 2023-01-03 RX ADMIN — ENOXAPARIN SODIUM 40 MG: 100 INJECTION SUBCUTANEOUS at 09:36

## 2023-01-03 RX ADMIN — ATORVASTATIN CALCIUM 80 MG: 40 TABLET, FILM COATED ORAL at 20:32

## 2023-01-03 RX ADMIN — VANCOMYCIN HYDROCHLORIDE 1000 MG: 5 INJECTION, POWDER, LYOPHILIZED, FOR SOLUTION INTRAVENOUS at 14:53

## 2023-01-03 RX ADMIN — VANCOMYCIN HYDROCHLORIDE 1250 MG: 5 INJECTION, POWDER, LYOPHILIZED, FOR SOLUTION INTRAVENOUS at 02:40

## 2023-01-03 RX ADMIN — POTASSIUM CHLORIDE 30 MEQ: 10 CAPSULE, COATED, EXTENDED RELEASE ORAL at 14:53

## 2023-01-03 RX ADMIN — DEXTROSE MONOHYDRATE 25 G: 25 INJECTION, SOLUTION INTRAVENOUS at 04:49

## 2023-01-03 RX ADMIN — ASPIRIN 81 MG: 81 TABLET, COATED ORAL at 09:37

## 2023-01-03 RX ADMIN — OSELTAMIVIR PHOSPHATE 75 MG: 75 CAPSULE ORAL at 14:08

## 2023-01-03 RX ADMIN — Medication 10 ML: at 20:33

## 2023-01-03 NOTE — THERAPY EVALUATION
Acute Care - Physical Therapy Initial Evaluation   [facility]     Patient Name: Tyrone Garcia  : 1960  MRN: [id number]  Today's Date: 1/3/2023      Admit date: 2023     Referring Physician: Zachary Ayala MD     Surgery Date:* No surgery found *              Visit Dx:     ICD-10-CM ICD-9-CM   1. Left sided numbness  R20.0 782.0   2. Uncontrolled hypertension  I10 401.9   3. TIA (transient ischemic attack)  G45.9 435.9   4. Chronic obstructive pulmonary disease, unspecified COPD type (HCC)  J44.9 496   5. Pneumonia of both lungs due to infectious organism, unspecified part of lung  J18.9 483.8   6. Difficulty in walking  R26.2 719.7     Patient Active Problem List   Diagnosis   • Left sided numbness     Past Medical History:   Diagnosis Date   • COPD (chronic obstructive pulmonary disease) ([facility])    • Diabetes (HCC)     TYPE 1   • Hypertension      Past Surgical History:   Procedure Laterality Date   • ABDOMINAL HERNIA REPAIR     • EYE SURGERY      X 4 FOR BLEEDING BEHIND BILATERAL EYES     PT Assessment (last 12 hours)     PT Evaluation and Treatment     Row Name 23 1000          Physical Therapy Time and Intention    Subjective Information no complaints  -MINDY     Document Type evaluation  -MINDY     Mode of Treatment individual therapy;physical therapy  -MINDY     Patient Effort good  -MINDY     Row Name 23 1000          General Information    Patient Observations alert;cooperative;agree to therapy  -MINDY     Prior Level of Function independent:;all household mobility;community mobility  -MINDY     Equipment Currently Used at Home none  -MINDY     Barriers to Rehab none identified  -MINDY     Row Name 23 1000          Living Environment    Current Living Arrangements home  -MINDY     People in Home spouse  -MINDY     Row Name 23 1000          Range of Motion (ROM)    Range of Motion ROM is WFL  -MINDY     Row Name 23 1000          Strength (Manual Muscle Testing)    Strength (Manual Muscle  Testing) strength is WFL  -MINDY     Row Name 01/03/23 1000          Bed Mobility    Bed Mobility bed mobility (all) activities  -MINDY     All Activities, [place] (Bed Mobility) independent  -MINDY     Row Name 01/03/23 1000          Transfers    Transfers sit-stand transfer  -MINDY     Row Name 01/03/23 1000          Sit-Stand Transfer    Sit-Stand [place] (Transfers) independent  -MINDY     Row Name 01/03/23 1000          Gait/Stairs (Locomotion)    Gait/Stairs Locomotion gait/ambulation independence  -MINDY     [place] Level (Gait) independent  -MINDY     Distance in Feet (Gait) 50  -MINDY     Row Name 01/03/23 1000          Balance    Balance Assessment standing dynamic balance  -MINDY     Dynamic Standing Balance independent  -MINDY     Row Name 01/03/23 1000          Plan of Care Review    Plan of Care Reviewed With patient  -MINDY     Outcome Evaluation Patient did not demonstrate any safety or mobility deficits during the initial evaluation.  He is safe to continue ambulating within his room independently until his discharge from the hospital.  He will be discharged from physical therapy services at this time  -MINDY     Row Name 01/03/23 1000          Therapy Assessment/Plan (PT)    Criteria for Skilled Interventions Met (PT) no problems identified which require skilled intervention  -MINDY     Therapy Frequency (PT) evaluation only  -MINDY     Row Name 01/03/23 1000          PT Evaluation Complexity    History, PT Evaluation Complexity no personal factors and/or comorbidities  -MINDY     Examination of Body Systems (PT Eval Complexity) total of 4 or more elements  -MINDY     Clinical Presentation (PT Evaluation Complexity) stable  -MINDY     Clinical Decision Making (PT Evaluation Complexity) low complexity  -MINDY     Overall Complexity (PT Evaluation Complexity) low complexity  -MINDY           User Key  (r) = Recorded By, (t) = Taken By, (c) = Cosigned By    Initials Name Provider Type    Negrito Coy, PT Physical Therapist                   PT Recommendation and Plan  Anticipated Discharge Disposition (PT): home  Therapy Frequency (PT): evaluation only  Plan of Care Reviewed With: patient  Outcome Evaluation: Patient did not demonstrate any safety or mobility deficits during the initial evaluation.  He is safe to continue ambulating within his room independently until his discharge from the hospital.  He will be discharged from physical therapy services at this time   Outcome Measures     Row Name 01/03/23 1000             How much help from another person do you currently need...    Turning from your back to your side while in flat bed without using bedrails? 4  -MINDY      Moving from lying on back to sitting on the side of a flat bed without bedrails? 4  -MINDY      Moving to and from a bed to a chair (including a wheelchair)? 4  -MINDY      Standing up from a chair using your arms (e.g., wheelchair, bedside chair)? 4  -MINDY      Climbing 3-5 steps with a railing? 4  -MINDY      To walk in hospital room? 4  -MINDY      AM-PAC 6 Clicks Score (PT) 24  -MINDY         Functional Assessment    Outcome Measure Options AM-PAC 6 Clicks Basic Mobility (PT)  -MINDY            User Key  (r) = Recorded By, (t) = Taken By, (c) = Cosigned By    Initials Name Provider Type    Negrito Coy PT Physical Therapist                 Time Calculation:    PT Charges     Row Name 01/03/23 1010             Time Calculation    PT Received On 01/03/23  -MINDY         Untimed Charges    PT Eval/Re-eval Minutes 20  -MINDY         Total Minutes    Untimed Charges Total Minutes 20  -MINDY       Total Minutes 20  -MINDY            User Key  (r) = Recorded By, (t) = Taken By, (c) = Cosigned By    Initials Name Provider Type    Negrito Coy PT Physical Therapist              Therapy Charges for Today     Code Description Service Date Service Provider Modifiers Qty    68864032597 HC PT EVAL LOW COMPLEXITY 2 1/3/2023 Negrito Harris PT GP 1          PT G-Codes  Outcome Measure Options: AM-PAC 6  Clicks Basic Mobility (PT)  -[facility] 6 Clicks Score (PT): 24    Negrito Harris, PT  1/3/2023

## 2023-01-03 NOTE — PLAN OF CARE
Goal Outcome Evaluation:   VSS. No complaints this shift. Pt bs 41 this am, gave d50, bs came up to 199. Will recheck with am sugar check before breakfast.

## 2023-01-03 NOTE — PROGRESS NOTES
"[facility] Clinical Pharmacy Services: Vancomycin Monitoring Note    Tyrone Garcia is a 62 y.o. male who is on day  of pharmacy to dose vancomycin for Bacteremia.    Previous Vancomycin Dose:   1000 mg IV every  12  hours  Imaging Reviewed?: Yes  Updated Cultures and Sensitivities:    Blood Cx: Staph. Not aureus or lugdunensis    Blood cx : Aerobic gram positive cocci in clusters     Vitals/Labs  Ht: 180.3 cm (71\"); Wt: 62.7 kg (138 lb 3.7 oz)   Temp (24hrs), Av.7 °F (36.5 °C), Min:97.2 °F (36.2 °C), Max:98.3 °F (36.8 °C)   Estimated Creatinine Clearance: 95.8 mL/min (A) (by C-G formula based on SCr of 0.7 mg/dL (L)).        Results from last 7 days   Lab Units 23  0341 23  0335 23  2105   CREATININE mg/dL  --  0.70* 0.78   WBC 10*3/mm3 7.77  --  10.41     Assessment/Plan    Current Vancomycin Dose:  1000 mg IV every 12 hours; which provides the following predicted parameters:  AUC24,ss: 530 mg/L.hr  PAUC*: 78 %  Ctrough,ss: 16 mg/L  Pconc*: 33 %  Tox.: 11 %  Next Vanc Random ordered for  at 1200  We will continue to monitor patient changes and renal function     Thank you for involving pharmacy in this patient's care. Please contact pharmacy with any questions or concerns.    Sanju Bose [facility]  Clinical Pharmacist    "

## 2023-01-03 NOTE — PLAN OF CARE
Goal Outcome Evaluation:  Plan of Care Reviewed With: patient           Outcome Evaluation: Patient did not demonstrate any safety or mobility deficits during the initial evaluation.  He is safe to continue ambulating within his room independently until his discharge from the hospital.  He will be discharged from physical therapy services at this time

## 2023-01-03 NOTE — PLAN OF CARE
Goal Outcome Evaluation:   Blood cultures resulted positive, ABTs started, see orders.  Patient tolerating well.  Spouse at bedside most of shift.

## 2023-01-03 NOTE — CONSULTS
[facility]   VASCULAR SURGERY CONSULT    Patient Name: Tyrone Garcia  : 1960  MRN: [id number]  Primary Care Physician:  Mana Stafford APRN  Date of admission: 2023    Subjective   Subjective     Chief Complaint: Symptomatic high-grade right carotid artery stenosis    HPI:    Tyrone Garcia is a 62 y.o. male who presents with left upper extremity weakness and numbness.  Patient reported waking up with the symptoms and found that he was unable to perform purposeful movements with his left upper extremity.  He denies any previous episodes of TIA, amaurosis fugax or completed stroke.  Work-up demonstrated acute infarcts in the right hemisphere and severe high-grade stenosis of the right ICA at 78%.  Vascular surgery was consulted for carotid endarterectomy.  Of note, he has had recurrent pneumonia over the last 6 months and was recently evaluated in [place] by pulmonology with bronchoscopy and BAL.  Studies demonstrated no malignancy.  Transbronchial biopsy was also performed.    Review of Systems    All systems reviewed and negative except as noted above.    Personal History     Past Medical History:   Diagnosis Date   • COPD (chronic obstructive pulmonary disease) (HCC)    • Diabetes (HCC)     TYPE 1   • Hypertension        Past Surgical History:   Procedure Laterality Date   • ABDOMINAL HERNIA REPAIR     • EYE SURGERY      X 4 FOR BLEEDING BEHIND BILATERAL EYES       Family History: family history is not on file. Otherwise pertinent FHx was reviewed and not pertinent to current issue.    Social History:  reports that he quit smoking about 11 months ago. His smoking use included cigarettes. He has a 120.00 pack-year smoking history. He does not have any smokeless tobacco history on file. He reports that he does not drink alcohol and does not use drugs.    Home Medications:  Insulin Lispro, benzonatate, lisinopril, and traZODone      Allergies:  No Known Allergies    Objective    Objective     Vitals:   Temp:  [97.2 °F (36.2 °C)-98.3 °F (36.8 °C)] 97.2 °F (36.2 °C)  Heart Rate:  [] 77  Resp:  [17-22] 18  BP: (140-176)/() 176/92  Flow (L/min):  [2] 2    Physical Exam    General: Awake, alert, NAD   Eyes:  GILBERT   Neck: Supple   Lungs: Clear   Heart: RRR   Abdomen: benign   Musculoskeletal:  normal motor tone, symmetric   Skin: warm, normal turgor   Neuro: strength 5/5 all extremities   Pulses: Palpable femoral pulses.     Diagnostic studies: Labs, data, CTA, MRI reviewed.  Patient found to have bacteremia with staph aureus.    Assessment & Plan   Assessment / Plan     Active Hospital Problems:  Active Hospital Problems    Diagnosis    • **Left sided numbness        Assessment/plan:   Symptomatic high-grade right carotid artery stenosis.  Asymptomatic high-grade left carotid artery stenosis.  Recurrent pneumonia with positive blood culture on this admission.  Pulmonary nodules/cysts.  COPD on home oxygen.  Diabetes on insulin pump, uncontrolled with A1c 7.8  Hypertension, severe  Influenza type a and B    Plan  Best medical treatment with aspirin, high-dose statin.  Pulmonary evaluation for recurrent pneumonia and COPD optimization as well as timing of anticipated right carotid endarterectomy.  2D echo  Continue antibiotics for bacteremia per primary team.  Vascular surgery will follow.        Electronically signed by Maggie Spencer MD, 01/03/23, 8:17 AM EST.

## 2023-01-04 LAB
GLUCOSE BLDC GLUCOMTR-MCNC: 103 MG/DL (ref 70–99)
GLUCOSE BLDC GLUCOMTR-MCNC: 139 MG/DL (ref 70–99)
GLUCOSE BLDC GLUCOMTR-MCNC: 170 MG/DL (ref 70–99)
GLUCOSE BLDC GLUCOMTR-MCNC: 79 MG/DL (ref 70–99)
VANCOMYCIN SERPL-MCNC: 11.6 MCG/ML (ref 5–40)

## 2023-01-04 PROCEDURE — 25010000002 VANCOMYCIN 5 G RECONSTITUTED SOLUTION: Performed by: PHYSICIAN ASSISTANT

## 2023-01-04 PROCEDURE — 92610 EVALUATE SWALLOWING FUNCTION: CPT

## 2023-01-04 PROCEDURE — 25010000002 ENOXAPARIN PER 10 MG: Performed by: INTERNAL MEDICINE

## 2023-01-04 PROCEDURE — 82962 GLUCOSE BLOOD TEST: CPT

## 2023-01-04 PROCEDURE — 99232 SBSQ HOSP IP/OBS MODERATE 35: CPT | Performed by: SURGERY

## 2023-01-04 PROCEDURE — 25010000002 AMPICILLIN-SULBACTAM PER 1.5 G: Performed by: FAMILY MEDICINE

## 2023-01-04 PROCEDURE — 99233 SBSQ HOSP IP/OBS HIGH 50: CPT | Performed by: FAMILY MEDICINE

## 2023-01-04 PROCEDURE — 94799 UNLISTED PULMONARY SVC/PX: CPT

## 2023-01-04 PROCEDURE — 80202 ASSAY OF VANCOMYCIN: CPT | Performed by: PHYSICIAN ASSISTANT

## 2023-01-04 PROCEDURE — 97166 OT EVAL MOD COMPLEX 45 MIN: CPT

## 2023-01-04 RX ADMIN — ASPIRIN 81 MG: 81 TABLET, COATED ORAL at 09:09

## 2023-01-04 RX ADMIN — Medication 10 ML: at 09:09

## 2023-01-04 RX ADMIN — Medication 10 ML: at 20:21

## 2023-01-04 RX ADMIN — ATORVASTATIN CALCIUM 80 MG: 40 TABLET, FILM COATED ORAL at 20:21

## 2023-01-04 RX ADMIN — AMPICILLIN SODIUM AND SULBACTAM SODIUM 3 G: 2; 1 INJECTION, POWDER, FOR SOLUTION INTRAMUSCULAR; INTRAVENOUS at 13:36

## 2023-01-04 RX ADMIN — OSELTAMIVIR PHOSPHATE 75 MG: 75 CAPSULE ORAL at 09:09

## 2023-01-04 RX ADMIN — Medication: at 13:36

## 2023-01-04 RX ADMIN — ACETAMINOPHEN 650 MG: 325 TABLET ORAL at 09:09

## 2023-01-04 RX ADMIN — VANCOMYCIN HYDROCHLORIDE 1000 MG: 5 INJECTION, POWDER, LYOPHILIZED, FOR SOLUTION INTRAVENOUS at 03:29

## 2023-01-04 RX ADMIN — AMPICILLIN SODIUM AND SULBACTAM SODIUM 3 G: 2; 1 INJECTION, POWDER, FOR SOLUTION INTRAMUSCULAR; INTRAVENOUS at 20:20

## 2023-01-04 RX ADMIN — ENOXAPARIN SODIUM 40 MG: 100 INJECTION SUBCUTANEOUS at 09:10

## 2023-01-04 RX ADMIN — OSELTAMIVIR PHOSPHATE 75 MG: 75 CAPSULE ORAL at 20:21

## 2023-01-04 NOTE — PROGRESS NOTES
[facility]   Hospitalist Progress Note  Date: 2023  Patient Name: Tyrone Garcia  : 1960  MRN: [id number]  Date of admission: 2023      Subjective   Subjective     Chief Complaint: Left-sided weakness    Summary:Tyrone Garcia is a 62 y.o. male with a history of COPD, diabetes, hypertension patient presents with left-sided weakness.  Patient started with a coughing fit and headache day prior to admission.  Patient states he woke up with heaviness on his left side.  Patient's influenza came back as indeterminate, therefore this indicates patient is positive for both influenza A and influenza B.  Initial blood cultures positive for gram-positive cocci speciation pending.  Repeat cultures obtained.  Started on empiric antibiotics. Patient's chest x-ray represents multifocal pneumonia and a CT of his carotids shows large pulmonary nodules therefore CT chest was ordered and confirmed new or increased bilateral pulmonary nodules.  Patient has been having these worked up and followed by Dr. Cisse in [place] and had recent bronchoscopy.  Patient's MRI brain shows subacute infarcts on the right frontal region, patient found to have 78% stenosis of the proximal right internal carotid artery CT angiogram.  Neurology was consulted on the case, vascular surgery was consulted on the case.  Ultrasound the carotids demonstrated large dense right carotid bulb plaque with high-grade stenosis as well as severe stenosis of the proximal right internal carotid artery.  Vascular surgery planning to address after infection resolved    Interval Followup: Patient sitting up in bed resting comfortably eating lunch.  Patient states left sided weakness is improving.  Blood sugars fairly well controlled.  Sinus rhythm 70s 90s on telemetry review.  Repeat blood cultures negative.  Initial blood cultures appear to be contaminated.  No other issues per nursing.    Review of Systems  Constitutional:  Negative for fatigue and fever.   HENT: Negative for sore throat and trouble swallowing.    Eyes: Negative for pain and discharge.   Respiratory: Negative for cough and shortness of breath.    Cardiovascular: Negative for chest pain and palpitations.   Gastrointestinal: Negative for abdominal pain, nausea and vomiting.   Endocrine: Negative for cold intolerance and heat intolerance.   Genitourinary: Negative for difficulty urinating and dysuria.   Musculoskeletal: Negative for back pain and neck stiffness.   Skin: Negative for color change and rash.   Neurological: Negative for syncope and headaches.  Left-sided weakness  Hematological: Negative for adenopathy.   Psychiatric/Behavioral: Negative for confusion and hallucinations.    Objective   Objective     Vitals:   Temp:  [97.2 °F (36.2 °C)-98.2 °F (36.8 °C)] 97.2 °F (36.2 °C)  Heart Rate:  [56-96] 79  Resp:  [16-18] 18  BP: (135-172)/(74-87) 155/77  Flow (L/min):  [2] 2  Physical Exam   Gen. well-developed appearing stated age in no acute distress  HEENT: Normocephalic atraumatic moist membranes pupils equal round reactive light, no scleral icterus no conjunctival injection  Cardiovascular: regular rate and rhythm no murmurs rubs or gallops S1-S2, no lower extremity edema appreciated  Pulmonary: Clear to auscultation bilaterally crackles bilaterally with scant expiratory wheezes, no rhonchi symmetric chest expansion, unlabored, no conversational dyspnea appreciated  Gastrointestinal: Soft nontender nondistended positive bowel sounds all 4 quadrants no rebound or guarding  Musculoskeletal: No clubbing cyanosis, warm and well-perfused, calves soft symmetric nontender bilaterally  Skin: Clean dry without rashs  Neuro: Cranial nerves II through XII intact grossly no sensorimotor deficits appreciated bilateral upper and lower extremities, 4 out of 5 left upper extremity strength on flexion extension  Psych: Patient is calm cooperative and appropriate with exam not  responding to internal stimuli  : No Verduzco catheter no bladder distention no suprapubic tenderness    Result Review    Result Review:  I have personally reviewed these results and agree with these findings:  [x]  Laboratory  LAB RESULTS:      Lab 01/03/23 0341 01/02/23 0346 01/01/23 2105   WBC 7.77  --  10.41   HEMOGLOBIN 14.4  --  14.9   HEMATOCRIT 43.3  --  45.9   PLATELETS 285  --  344   NEUTROS ABS  --   --  6.84   IMMATURE GRANS (ABS)  --   --  0.03   LYMPHS ABS  --   --  2.17   MONOS ABS  --   --  0.89   EOS ABS  --   --  0.38   MCV 88.9  --  91.8   PROCALCITONIN  --   --  0.05   LACTATE  --  0.5  --    PROTIME  --   --  13.1         Lab 01/03/23 0335 01/01/23 2105   SODIUM 141 143   POTASSIUM 3.7 4.5   CHLORIDE 102 101   CO2 31.1* 34.5*   ANION GAP 7.9 7.5   BUN 20 21   CREATININE 0.70* 0.78   EGFR 103.5 100.2   GLUCOSE 41* 110*   CALCIUM 9.1 9.5   MAGNESIUM 2.0 2.0   HEMOGLOBIN A1C  --  8.50*         Lab 01/01/23 2105   TOTAL PROTEIN 7.7   ALBUMIN 4.0   GLOBULIN 3.7   ALT (SGPT) 12   AST (SGOT) 17   BILIRUBIN 0.3   ALK PHOS 117         Lab 01/01/23 2105   PROBNP 429.3   PROTIME 13.1   INR 0.98         Lab 01/01/23 2105   CHOLESTEROL 201*   LDL CHOL 109*   HDL CHOL 76*   TRIGLYCERIDES 90             Brief Urine Lab Results     None        Microbiology Results (last 10 days)     Procedure Component Value - Date/Time    Blood Culture - Blood, Arm, Left [[id number]]  (Normal) Collected: 01/03/23 0335    Lab Status: Preliminary result Specimen: Blood from Arm, Left Updated: 01/04/23 0346     Blood Culture No growth at 24 hours    Blood Culture - Blood, Hand, Left [[id number]]  (Normal) Collected: 01/03/23 0334    Lab Status: Preliminary result Specimen: Blood from Hand, Left Updated: 01/04/23 0346     Blood Culture No growth at 24 hours    Blood Culture - Blood, Arm, Left [[id number]]  (Abnormal) Collected: 01/02/23 0346    Lab Status: Preliminary result Specimen: Blood from Arm, Left Updated: 01/04/23  0713     Blood Culture Staphylococcus, coagulase negative     Isolated from Aerobic Bottle     Gram Stain Aerobic Bottle Gram positive cocci in clusters      Anaerobic Bottle Gram positive cocci in clusters    Blood Culture - Blood, Arm, Left [[id number]]  (Abnormal) Collected: 01/02/23 0346    Lab Status: Preliminary result Specimen: Blood from Arm, Left Updated: 01/04/23 0715     Blood Culture Staphylococcus, coagulase negative     Isolated from Aerobic Bottle     Gram Stain Aerobic Bottle Gram positive cocci in clusters    Blood Culture ID, PCR - Blood, Arm, Left [[id number]]  (Abnormal) Collected: 01/02/23 0346    Lab Status: Final result Specimen: Blood from Arm, Left Updated: 01/03/23 0339     BCID, PCR Staph spp, not aureus or lugdunensis. Identification by BCID2 PCR.    Influenza Antigen, Rapid - Swab, Nasopharynx [[id number]]  (Abnormal) Collected: 01/02/23 0152    Lab Status: Final result Specimen: Swab from Nasopharynx Updated: 01/02/23 0242     Influenza A Ag, EIA Indeterminate     Influenza B Ag, EIA Indeterminate    COVID-19,APTIMA PANTHER(LINETTE),[facility], NP/OP SWAB IN UTM/VTM/SALINE TRANSPORT MEDIA,24 HR TAT - Swab, Nasal Cavity [[id number]]  (Normal) Collected: 01/02/23 0049    Lab Status: Final result Specimen: Swab from Nasal Cavity Updated: 01/02/23 0524     COVID19 Not Detected    Narrative:      Fact sheet for providers: https://www.fda.gov/media/117702/download     Fact sheet for patients: https://www.fda.gov/media/752690/download    Test performed by RT PCR.    Influenza Antigen, Rapid - Swab, Nasopharynx [[id number]]  (Abnormal) Collected: 01/02/23 0049    Lab Status: Final result Specimen: Swab from Nasopharynx Updated: 01/02/23 0140     Influenza A Ag, EIA Indeterminate     Influenza B Ag, EIA Indeterminate          [x]  Microbiology  [x]  Radiology  CT Angiogram Neck    Result Date: 1/2/2023    1. There is 78 percent stenosis within the proximal RIGHT internal carotid artery by NASCET  criteria.  2. There is 47 percent stenosis within the proximal LEFT internal carotid artery by NASCET criteria.  3. No hemodynamically significant stenoses are seen elsewhere.  4. No arterial dissection is seen.  5. New or increased pulmonary nodules are seen within the partially imaged upper lung zones.  Many of these are cavitating or may represent thick-walled pulmonary cysts.  The findings are age-indeterminate.  An age-indeterminate infectious/inflammatory respiratory process is possible.  For instance, age-indeterminate granulomatous disease would be in the differential diagnosis, including, but not necessarily limited to fungal pneumonia as well as active pulmonary tuberculosis (mycobacterial pneumonia).  Other types of atypical pulmonary infectious processes cannot be excluded.  Wegener granulomatosis (or granulomatosis with polyangiitis), pulmonary sarcoidosis is, and/or septic emboli cannot be excluded.  6. There is a large bulla in the right pulmonary apex, as seen previously.  It contains air-fluid levels.  It may be infected. 7. There are emphysematous changes of the lungs.  1. 8. Please see above comments for further detail. 1.   Please note that portions of this note were completed with a voice recognition program.  RAMON VALIENTE JR, MD       Electronically Signed and Approved By: RAMON VALIENTE JR, MD on 1/02/2023 at 4:00              CT Chest Without Contrast Diagnostic    Result Date: 1/2/2023     1. New or increased bilateral pulmonary nodules are seen, as discussed.  These findings are noncalcified.  Many of the nodules exhibit cavitation.  The findings are age-indeterminate.  An age-indeterminate infectious/inflammatory respiratory process is possible.  For instance, age-indeterminate granulomatous disease would be in the differential diagnosis, including, but not necessarily limited to fungal pneumonia as well as active pulmonary tuberculosis (mycobacterial pneumonia).  Other types of atypical  pulmonary infectious processes cannot be excluded.  Wegener granulomatosis (or granulomatosis with polyangiitis), pulmonary sarcoidosis is, and/or septic emboli cannot be excluded.  Pulmonary metastases cannot be excluded entirely but are thought to be less likely.  2. Moderate to severe emphysematous changes involve the lungs.  3. No pneumothorax or pneumomediastinum.  4. No pleural or pericardial effusion.  5. No definite filling defects are seen in the chronically dilated central airway.  6. Extensive atherosclerotic changes are noted, including involvement of the coronary arteries.  7. No cardiac enlargement.  8. Please see above comments for further detail.      Please note that portions of this note were completed with a voice recognition program.  RAMON VALIENTE JR, MD       Electronically Signed and Approved By: RAMON VALIENTE JR, MD on 1/02/2023 at 19:24              MRI Brain Without Contrast    Result Date: 1/2/2023    MR examination of the brain without IV contrast demonstrating mild atrophy and white matter changes.  Gyriform subacute infarcts are seen in the high right frontal region, with bright signal on diffusion-weighted images and restricted diffusion.  1 cm focus of encephalomalacia in the posterior limb of the internal capsule on the right is consistent with an older infarct.  I discussed findings with Dr. fuentes at 1500 hours.      JACQUELINE DAVIS MD       Electronically Signed and Approved By: JACQUELINE DAVIS MD on 1/02/2023 at 14:58             XR Chest 1 View    Result Date: 1/2/2023   New bilateral infiltrates are seen.  The findings may represent infectious multifocal pneumonia.       Please note that portions of this note were completed with a voice recognition program.  RAMON VALIENTE JR, MD       Electronically Signed and Approved By: RAMON VALIENTE JR, MD on 1/02/2023 at 0:46              CT Angiogram Head w AI Analysis of LVO    Result Date: 1/2/2023   No emergent large vessel occlusion  is identified.     Please note that portions of this note were completed with a voice recognition program.  RAMON VALIENTE JR, MD       Electronically Signed and Approved By: RAMON VALIENTE JR, MD on 1/02/2023 at 3:52                [x]  EKG/Telemetry   []  Cardiology/Vascular   []  Pathology  []  Old records  [x]  Other:  Scheduled Meds:ampicillin-sulbactam, 3 g, Intravenous, Q6H  aspirin, 81 mg, Oral, Daily  atorvastatin, 80 mg, Oral, Nightly  enoxaparin, 40 mg, Subcutaneous, Daily  oseltamivir, 75 mg, Oral, Q12H  sodium chloride, 10 mL, Intravenous, Q12H      Continuous Infusions:insulin,       PRN Meds:.•  acetaminophen  •  albuterol  •  dextrose  •  dextrose  •  glucagon (human recombinant)  •  nitroglycerin  •  [COMPLETED] Insert peripheral IV **AND** sodium chloride  •  sodium chloride  •  sodium chloride      Assessment & Plan   Assessment / Plan     Assessment/Plan:  Acute stroke, ischemic  Severe right carotid artery stenosis  Pulmonary nodules/pulmonary cysts  Influenza A, influenza B positive   COPD without acute exacerbation  Insulin-dependent diabetes  Hypertension  Ruled out bacteremia     Plan:  Patient admitted to hospital for further care management  Neurology consulted, appreciate assistance  Vascular surgery consulted, appreciate assistance  Continue aspirin  Continue high intensity statin  Continue to allow for permissive hypertension due to carotid stenosis with stroke  Plan for right endarterectomy once infection resolves tentatively on Monday per Vascular Surgery   Pulmonary nodules being followed by patient's outpatient pulmonologist in [place]  Patient remains in isolation for influenza positive status  Continue Tamiflu to complete 5 day course  Continue as needed DuoNebs  Continue to hold home lisinopril  Change antibiotics to unasyn for pna coverage   Continue home insulin pump, will closely monitor CBGs while inpatient  Further inpatient orders recommendations pending clinical  course        Discussed plan with bedside RN and vascular surgery attending.    Disposition: Likely home after endarterectomy next week.     DVT prophylaxis:  Medical DVT prophylaxis orders are present.    CODE STATUS:   Code Status (Patient has no pulse and is not breathing): CPR (Attempt to Resuscitate)  Medical Interventions (Patient has pulse or is breathing): Full Support

## 2023-01-04 NOTE — PROGRESS NOTES
[facility]   Progress Note    Patient Name: Tyrone Garcia  : 1960  MRN: [id number]  Primary Care Physician:  Mana Stafford APRN  Date of admission: 2023    Subjective   Subjective     Chief Complaint: Symptomatic high-grade right carotid artery stenosis.    HPI:  Patient Reports no acute overnight events.    Review of Systems   Within normal limits unless otherwise noted in HPI.     Objective   Objective     Vitals:   Temp:  [97.7 °F (36.5 °C)-98.2 °F (36.8 °C)] 98.1 °F (36.7 °C)  Heart Rate:  [56-96] 56  Resp:  [16-20] 16  BP: (135-172)/(74-87) 135/74  Flow (L/min):  [2] 2  Physical Exam    Constitutional: Awake, alert   Eyes: PERRLA, sclerae anicteric, no conjunctival injection   HENT: NCAT, mucous membranes moist   Neck: Supple, no thyromegaly, no lymphadenopathy, trachea midline   Respiratory: Clear to auscultation bilaterally, nonlabored respirations    Cardiovascular: RRR, no murmurs, rubs, or gallops, palpable pedal pulses bilaterally   Gastrointestinal: Positive bowel sounds, soft, nontender, nondistended   Musculoskeletal: No bilateral ankle edema, no clubbing or cyanosis to extremities   Psychiatric: Appropriate affect, cooperative   Neurologic: Oriented x 3, strength symmetric in all extremities, Cranial Nerves grossly intact to confrontation, speech clear   Skin: No rashes    Vascular: Palpable femoral pulses.  Palpable popliteal and pedal pulses.  No foot wounds or evidence of ischemia.        Result Review    Result Review:  I have personally reviewed the results from the time of this admission to 2023 14:47 EST and agree with these findings:  [x]  Laboratory list / accordion  [x]  Microbiology  [x]  Radiology  []  EKG/Telemetry   [x]  Cardiology/Vascular   [x]  Pathology  []  Old records  [x]  Other:  Most notable findings include: Carotid duplex demonstrated high-grade right ICA stenosis and moderate grade left ICA stenosis with bilateral antegrade vertebral  flow.      Assessment & Plan   Assessment / Plan     Brief Patient Summary:  Tyrone Garcia is a 62 y.o. male who symptomatic right ICA stenosis as well as recurrent pneumonia and bacteremia which was thought to be a contaminant.  Pulmonology recommends 5 days of IV antibiotic treatment prior to proceeding with endarterectomy.    Active Hospital Problems:  Active Hospital Problems    Diagnosis    • **Left sided numbness      Plan:   Continue medical management.  Please obtain 2D echo.  Patient will be scheduled for carotid endarterectomy next week.       DVT prophylaxis:  Medical DVT prophylaxis orders are present.    CODE STATUS:   Code Status (Patient has no pulse and is not breathing): CPR (Attempt to Resuscitate)  Medical Interventions (Patient has pulse or is breathing): Full Support    Disposition:  I expect patient to be discharged per primary team.    Maggie Spencer MD

## 2023-01-04 NOTE — PROGRESS NOTES
[facility]   Hospitalist Progress Note  Date: 1/3/2023  Patient Name: Tyrone Garcia  : 1960  MRN: [id number]  Date of admission: 2023      Subjective   Subjective     Chief Complaint: Left-sided weakness    Summary:Tyrone Garcia is a 62 y.o. male with a history of COPD, diabetes, hypertension patient presents with left-sided weakness.  Patient started with a coughing fit and headache day prior to admission.  Patient states he woke up with heaviness on his left side.  Patient's influenza came back as indeterminate, therefore this indicates patient is positive for both influenza A and influenza B.  Initial blood cultures positive for gram-positive cocci speciation pending.  Repeat cultures obtained.  Started on empiric antibiotics. Patient's chest x-ray represents multifocal pneumonia and a CT of his carotids shows large pulmonary nodules therefore CT chest was ordered and confirmed new or increased bilateral pulmonary nodules.  Patient has been having these worked up and followed by Dr. Cisse in [place] and had recent bronchoscopy.  Patient's MRI brain shows subacute infarcts on the right frontal region, patient found to have 78% stenosis of the proximal right internal carotid artery CT angiogram.  Neurology was consulted on the case, vascular surgery was consulted on the case.  Ultrasound the carotids demonstrated large dense right carotid bulb plaque with high-grade stenosis as well as severe stenosis of the proximal right internal carotid artery.  Vascular surgery planning to address after infection resolved    Interval Followup: Patient sitting up in bed resting comfortably with family at the bedside.  Patient continues to complain of left-sided weakness that he states is improved.  Blood sugars labile this morning.  Serum potassium slightly low this morning.  Sinus rhythm 70s 110s on telemetry review.  Repeat blood cultures pending.  Initial blood cultures appear to  be contaminated.  No other issues per nursing.    Review of Systems  Constitutional: Negative for fatigue and fever.   HENT: Negative for sore throat and trouble swallowing.    Eyes: Negative for pain and discharge.   Respiratory: Negative for cough and shortness of breath.    Cardiovascular: Negative for chest pain and palpitations.   Gastrointestinal: Negative for abdominal pain, nausea and vomiting.   Endocrine: Negative for cold intolerance and heat intolerance.   Genitourinary: Negative for difficulty urinating and dysuria.   Musculoskeletal: Negative for back pain and neck stiffness.   Skin: Negative for color change and rash.   Neurological: Negative for syncope and headaches.  Left-sided weakness  Hematological: Negative for adenopathy.   Psychiatric/Behavioral: Negative for confusion and hallucinations.    Objective   Objective     Vitals:   Temp:  [97.2 °F (36.2 °C)-98.2 °F (36.8 °C)] 98.1 °F (36.7 °C)  Heart Rate:  [77-98] 87  Resp:  [16-20] 20  BP: (147-176)/(78-92) 158/82  Flow (L/min):  [2-3] 3  Physical Exam   Gen. well-developed appearing stated age in no acute distress  HEENT: Normocephalic atraumatic moist membranes pupils equal round reactive light, no scleral icterus no conjunctival injection  Cardiovascular: regular rate and rhythm no murmurs rubs or gallops S1-S2, no lower extremity edema appreciated  Pulmonary: Clear to auscultation bilaterally crackles bilaterally with scant expiratory wheezes, no rhonchi symmetric chest expansion, unlabored, no conversational dyspnea appreciated  Gastrointestinal: Soft nontender nondistended positive bowel sounds all 4 quadrants no rebound or guarding  Musculoskeletal: No clubbing cyanosis, warm and well-perfused, calves soft symmetric nontender bilaterally  Skin: Clean dry without rashs  Neuro: Cranial nerves II through XII intact grossly no sensorimotor deficits appreciated bilateral upper and lower extremities, 4 out of 5 left upper extremity strength on  flexion extension  Psych: Patient is calm cooperative and appropriate with exam not responding to internal stimuli  : No Verduzco catheter no bladder distention no suprapubic tenderness    Result Review    Result Review:  I have personally reviewed these results and agree with these findings:  [x]  Laboratory  LAB RESULTS:      Lab 01/03/23 0341 01/02/23 0346 01/01/23 2105   WBC 7.77  --  10.41   HEMOGLOBIN 14.4  --  14.9   HEMATOCRIT 43.3  --  45.9   PLATELETS 285  --  344   NEUTROS ABS  --   --  6.84   IMMATURE GRANS (ABS)  --   --  0.03   LYMPHS ABS  --   --  2.17   MONOS ABS  --   --  0.89   EOS ABS  --   --  0.38   MCV 88.9  --  91.8   PROCALCITONIN  --   --  0.05   LACTATE  --  0.5  --    PROTIME  --   --  13.1         Lab 01/03/23 0335 01/01/23 2105   SODIUM 141 143   POTASSIUM 3.7 4.5   CHLORIDE 102 101   CO2 31.1* 34.5*   ANION GAP 7.9 7.5   BUN 20 21   CREATININE 0.70* 0.78   EGFR 103.5 100.2   GLUCOSE 41* 110*   CALCIUM 9.1 9.5   MAGNESIUM 2.0 2.0   HEMOGLOBIN A1C  --  8.50*         Lab 01/01/23 2105   TOTAL PROTEIN 7.7   ALBUMIN 4.0   GLOBULIN 3.7   ALT (SGPT) 12   AST (SGOT) 17   BILIRUBIN 0.3   ALK PHOS 117         Lab 01/01/23 2105   PROBNP 429.3   PROTIME 13.1   INR 0.98         Lab 01/01/23 2105   CHOLESTEROL 201*   LDL CHOL 109*   HDL CHOL 76*   TRIGLYCERIDES 90             Brief Urine Lab Results     None        Microbiology Results (last 10 days)     Procedure Component Value - Date/Time    Blood Culture - Blood, Arm, Left [[id number]]  (Abnormal) Collected: 01/02/23 0346    Lab Status: Preliminary result Specimen: Blood from Arm, Left Updated: 01/03/23 0147     Blood Culture Abnormal Stain     Gram Stain Aerobic Bottle Gram positive cocci in clusters    Blood Culture - Blood, Arm, Left [[id number]]  (Abnormal) Collected: 01/02/23 0346    Lab Status: Preliminary result Specimen: Blood from Arm, Left Updated: 01/03/23 0146     Blood Culture Abnormal Stain     Gram Stain Aerobic Bottle Gram  positive cocci in clusters    Blood Culture ID, PCR - Blood, Arm, Left [[id number]]  (Abnormal) Collected: 01/02/23 0346    Lab Status: Final result Specimen: Blood from Arm, Left Updated: 01/03/23 0339     BCID, PCR Staph spp, not aureus or lugdunensis. Identification by BCID2 PCR.    Influenza Antigen, Rapid - Swab, Nasopharynx [[id number]]  (Abnormal) Collected: 01/02/23 0152    Lab Status: Final result Specimen: Swab from Nasopharynx Updated: 01/02/23 0242     Influenza A Ag, EIA Indeterminate     Influenza B Ag, EIA Indeterminate    COVID-19,APTIMA PANTHER(LINETTE),[facility], NP/OP SWAB IN UTM/VTM/SALINE TRANSPORT MEDIA,24 HR TAT - Swab, Nasal Cavity [[id number]]  (Normal) Collected: 01/02/23 0049    Lab Status: Final result Specimen: Swab from Nasal Cavity Updated: 01/02/23 0524     COVID19 Not Detected    Narrative:      Fact sheet for providers: https://www.fda.gov/media/889969/download     Fact sheet for patients: https://www.fda.gov/media/966337/download    Test performed by RT PCR.    Influenza Antigen, Rapid - Swab, Nasopharynx [[id number]]  (Abnormal) Collected: 01/02/23 0049    Lab Status: Final result Specimen: Swab from Nasopharynx Updated: 01/02/23 0140     Influenza A Ag, EIA Indeterminate     Influenza B Ag, EIA Indeterminate          [x]  Microbiology  [x]  Radiology  CT Angiogram Neck    Result Date: 1/2/2023    1. There is 78 percent stenosis within the proximal RIGHT internal carotid artery by NASCET criteria.  2. There is 47 percent stenosis within the proximal LEFT internal carotid artery by NASCET criteria.  3. No hemodynamically significant stenoses are seen elsewhere.  4. No arterial dissection is seen.  5. New or increased pulmonary nodules are seen within the partially imaged upper lung zones.  Many of these are cavitating or may represent thick-walled pulmonary cysts.  The findings are age-indeterminate.  An age-indeterminate infectious/inflammatory respiratory process is possible.  For  instance, age-indeterminate granulomatous disease would be in the differential diagnosis, including, but not necessarily limited to fungal pneumonia as well as active pulmonary tuberculosis (mycobacterial pneumonia).  Other types of atypical pulmonary infectious processes cannot be excluded.  Wegener granulomatosis (or granulomatosis with polyangiitis), pulmonary sarcoidosis is, and/or septic emboli cannot be excluded.  6. There is a large bulla in the right pulmonary apex, as seen previously.  It contains air-fluid levels.  It may be infected. 7. There are emphysematous changes of the lungs.  1. 8. Please see above comments for further detail. 1.   Please note that portions of this note were completed with a voice recognition program.  RAMON VALIENTE JR, MD       Electronically Signed and Approved By: RAMON VALIENTE JR, MD on 1/02/2023 at 4:00              CT Chest Without Contrast Diagnostic    Result Date: 1/2/2023     1. New or increased bilateral pulmonary nodules are seen, as discussed.  These findings are noncalcified.  Many of the nodules exhibit cavitation.  The findings are age-indeterminate.  An age-indeterminate infectious/inflammatory respiratory process is possible.  For instance, age-indeterminate granulomatous disease would be in the differential diagnosis, including, but not necessarily limited to fungal pneumonia as well as active pulmonary tuberculosis (mycobacterial pneumonia).  Other types of atypical pulmonary infectious processes cannot be excluded.  Wegener granulomatosis (or granulomatosis with polyangiitis), pulmonary sarcoidosis is, and/or septic emboli cannot be excluded.  Pulmonary metastases cannot be excluded entirely but are thought to be less likely.  2. Moderate to severe emphysematous changes involve the lungs.  3. No pneumothorax or pneumomediastinum.  4. No pleural or pericardial effusion.  5. No definite filling defects are seen in the chronically dilated central airway.  6.  Extensive atherosclerotic changes are noted, including involvement of the coronary arteries.  7. No cardiac enlargement.  8. Please see above comments for further detail.      Please note that portions of this note were completed with a voice recognition program.  RAMON VALIENTE JR, MD       Electronically Signed and Approved By: RAMON VALIENTE JR, MD on 1/02/2023 at 19:24              MRI Brain Without Contrast    Result Date: 1/2/2023    MR examination of the brain without IV contrast demonstrating mild atrophy and white matter changes.  Gyriform subacute infarcts are seen in the high right frontal region, with bright signal on diffusion-weighted images and restricted diffusion.  1 cm focus of encephalomalacia in the posterior limb of the internal capsule on the right is consistent with an older infarct.  I discussed findings with Dr. fuentes at 1500 hours.      JACQUELINE DAVIS MD       Electronically Signed and Approved By: JACQUELINE DAVIS MD on 1/02/2023 at 14:58             XR Chest 1 View    Result Date: 1/2/2023   New bilateral infiltrates are seen.  The findings may represent infectious multifocal pneumonia.       Please note that portions of this note were completed with a voice recognition program.  RAMON VALIENTE JR, MD       Electronically Signed and Approved By: RAMON VALIENTE JR, MD on 1/02/2023 at 0:46              CT Angiogram Head w AI Analysis of LVO    Result Date: 1/2/2023   No emergent large vessel occlusion is identified.     Please note that portions of this note were completed with a voice recognition program.  RAMON VALIENTE JR, MD       Electronically Signed and Approved By: RAMON VALIENTE JR, MD on 1/02/2023 at 3:52                [x]  EKG/Telemetry   []  Cardiology/Vascular   []  Pathology  []  Old records  [x]  Other:  Scheduled Meds:aspirin, 81 mg, Oral, Daily  atorvastatin, 80 mg, Oral, Nightly  enoxaparin, 40 mg, Subcutaneous, Daily  oseltamivir, 75 mg, Oral, Q12H  sodium chloride, 10  mL, Intravenous, Q12H  vancomycin, 1,000 mg, Intravenous, Q12H      Continuous Infusions:insulin,   Pharmacy to dose vancomycin,       PRN Meds:.•  acetaminophen  •  dextrose  •  dextrose  •  glucagon (human recombinant)  •  nitroglycerin  •  Pharmacy to dose vancomycin  •  [COMPLETED] Insert peripheral IV **AND** sodium chloride  •  sodium chloride  •  sodium chloride      Assessment & Plan   Assessment / Plan     Assessment/Plan:  Acute stroke, ischemic  Severe right carotid artery stenosis  Pulmonary nodules/pulmonary cysts  Influenza A, influenza B positive   COPD without acute exacerbation  Insulin-dependent diabetes  Hypertension  Rule out bacteremia     Plan:  Patient admitted to hospital for further care management  Neurology consulted, appreciate assistance  Vascular surgery consulted, appreciate assistance  Continue aspirin  Continue high intensity statin  Continue to allow for permissive hypertension due to carotid stenosis with stroke  Further evaluation and treatment of severe right carotid artery stenosis per vascular surgery once infection resolves  Pulmonary nodules being followed by patient's outpatient pulmonologist in [place]  Records pending from bronchoscopy  Patient remains in isolation for influenza positive status  Start Tamiflu  Start as needed DuoNebs  Hold home lisinopril  Continue vancomycin pending repeat blood cultures and speciation  Continue home insulin pump, will closely monitor CBGs while inpatient  Further inpatient orders recommendations pending clinical course        Discussed plan with bedside RN.    Disposition: Likely home once bacteremia ruled out and okay with vascular surgery    DVT prophylaxis:  Medical DVT prophylaxis orders are present.    CODE STATUS:   Code Status (Patient has no pulse and is not breathing): CPR (Attempt to Resuscitate)  Medical Interventions (Patient has pulse or is breathing): Full Support

## 2023-01-04 NOTE — THERAPY EVALUATION
"Patient Name: Tyrone Garcia  : 1960    MRN: [id number]                              Today's Date: 2023       Admit Date: 2023    Visit Dx:     ICD-10-CM ICD-9-CM   1. Left sided numbness  R20.0 782.0   2. Uncontrolled hypertension  I10 401.9   3. TIA (transient ischemic attack)  G45.9 435.9   4. Chronic obstructive pulmonary disease, unspecified COPD type (HCC)  J44.9 496   5. Pneumonia of both lungs due to infectious organism, unspecified part of lung  J18.9 483.8   6. Difficulty in walking  R26.2 719.7     Patient Active Problem List   Diagnosis   • Left sided numbness     Past Medical History:   Diagnosis Date   • COPD (chronic obstructive pulmonary disease) (HCC)    • Diabetes ([facility])     TYPE 1   • Hypertension      Past Surgical History:   Procedure Laterality Date   • ABDOMINAL HERNIA REPAIR     • EYE SURGERY      X 4 FOR BLEEDING BEHIND BILATERAL EYES      General Information     Row Name 23 08          OT Time and Intention    Document Type evaluation  -EG     Mode of Treatment individual therapy;occupational therapy  -EG     Row Name 23 08          General Information    Patient Profile Reviewed yes  -EG     Prior Level of Function home management;ADL's;all household mobility;community mobility  -EG     Existing Precautions/Restrictions no known precautions/restrictions  -EG     Barriers to Rehab none identified  -EG     Row Name 23          Occupational Profile    Reason for Services/Referral (Occupational Profile) Patient is a 62-year-old male admitted to [facility] on 23.  OT was consulted due to recent TIA. OT to assess for deficits in ADL's/Transfers.  No previous OT services for current condition.  -EG     Patient Goals (Occupational Profile) \"Get out and be like I was, I don't do much in the winter but I like to mow in the summer\"  -EG     Row Name 23          Living Environment    People in Home significant other  -EG     " Name(s) of People in Home Sanjuana  -EG     Row Name 01/04/23 0800          Home Main Entrance    Number of Stairs, Main Entrance one  -EG     Stair Railings, Main Entrance railing on left side (ascending)  -EG     Row Name 01/04/23 0800          Stairs Within Home, Primary    Number of Stairs, Within Home, Primary none  -EG     Stair Railings, Within Home, Primary none  -EG     Row Name 01/04/23 0800          Cognition    Orientation Status (Cognition) oriented x 3  -EG     Row Name 01/04/23 0800          Safety Issues, Functional Mobility    Safety Issues Affecting Function (Mobility) impulsivity;awareness of need for assistance;insight into deficits/self-awareness  -EG     Impairments Affecting Function (Mobility) balance;coordination;endurance/activity tolerance;strength  -EG           User Key  (r) = Recorded By, (t) = Taken By, (c) = Cosigned By    Initials Name Provider Type    EG Rayan Espinal OT Occupational Therapist                 Mobility/ADL's     Row Name 01/04/23 0803          Bed Mobility    Bed Mobility bed mobility (all) activities  -EG     All Activities, [place] (Bed Mobility) independent  -EG     Row Name 01/04/23 0803          Transfers    Transfers sit-stand transfer;bed-chair transfer;stand-sit transfer  -EG     Row Name 01/04/23 0803          Bed-Chair Transfer    Bed-Chair [place] (Transfers) contact guard;minimum assist (75% patient effort)  -EG     Comment, (Bed-Chair Transfer) CGA required with 2 LOB backwards, righting self but requires one instance of assist from therapist  -EG     Row Name 01/04/23 0803          Sit-Stand Transfer    Sit-Stand [place] (Transfers) standby assist  -EG     Row Name 01/04/23 0803          Stand-Sit Transfer    Stand-Sit [place] (Transfers) standby assist  -EG     Row Name 01/04/23 0803          Functional Mobility    Functional Mobility- Ind. Level contact guard assist;minimum assist (75% patient effort)  -EG     Functional  Mobility- Comment Mobility performed from EOB to bathroom and back without use of an AD; 2 LOB one requiring assist from OT to right and maintain uprigth standing  -EG     Row Name 01/04/23 0803          Activities of Daily Living    BADL Assessment/Intervention --  UB bathing Set-up; LB bathing CGA; UB dressing set-up; LB dressing CGA; toileting CGA; Hyg/[place] Set-upA; Self-feeding Set-up  -EG           User Key  (r) = Recorded By, (t) = Taken By, (c) = Cosigned By    Initials Name Provider Type    EG Rayna Espinal OT Occupational Therapist               Obj/Interventions     Row Name 01/04/23 0805          Vision Assessment/Intervention    Visual Impairment/Limitations WFL  -EG     Row Name 01/04/23 0805          Range of Motion Comprehensive    General Range of Motion bilateral upper extremity ROM WFL  -EG     Row Name 01/04/23 0805          Strength Comprehensive (MMT)    General Manual Muscle Testing (MMT) Assessment no strength deficits identified  -EG     Row Name 01/04/23 0805          Motor Skills    Motor Skills coordination;functional endurance  -EG     Coordination fine motor deficit  LUE  -EG     Functional Endurance fair  -EG     Row Name 01/04/23 0805          Balance    Balance Assessment sit to stand dynamic balance;standing static balance  -EG     Sit to Stand Dynamic Balance standby assist  -EG     Static Standing Balance contact guard  -EG     Dynamic Standing Balance contact guard;minimal assist  -EG           User Key  (r) = Recorded By, (t) = Taken By, (c) = Cosigned By    Initials Name Provider Type    EG Rayna Espinal OT Occupational Therapist               Goals/Plan     Row Name 01/04/23 0809          Bed Mobility Goal 1 (OT)    Activity/Assistive Device (Bed Mobility Goal 1, OT) bed mobility activities, all  -EG     [place] Level/Cues Needed (Bed Mobility Goal 1, OT) modified independence  -EG     Time Frame (Bed Mobility Goal 1, OT) long term goal (LTG);10 days  -EG     Row  Name 01/04/23 0809          Transfer Goal 1 (OT)    Activity/Assistive Device (Transfer Goal 1, OT) transfers, all  -EG     [place] Level/Cues Needed (Transfer Goal 1, OT) modified independence  -EG     Time Frame (Transfer Goal 1, OT) long term goal (LTG);10 days  -EG     Row Name 01/04/23 0809          Bathing Goal 1 (OT)    Activity/Device (Bathing Goal 1, OT) bathing skills, all  -EG     [place] Level/Cues Needed (Bathing Goal 1, OT) modified independence  -EG     Time Frame (Bathing Goal 1, OT) long term goal (LTG);10 days  -EG     Row Name 01/04/23 0809          Dressing Goal 1 (OT)    Activity/Device (Dressing Goal 1, OT) dressing skills, all  -EG     [place]/Cues Needed (Dressing Goal 1, OT) modified independence  -EG     Time Frame (Dressing Goal 1, OT) long term goal (LTG);10 days  -EG     Row Name 01/04/23 0809          Toileting Goal 1 (OT)    Activity/Device (Toileting Goal 1, OT) toileting skills, all  -EG     [place] Level/Cues Needed (Toileting Goal 1, OT) modified independence  -EG     Time Frame (Toileting Goal 1, OT) long term goal (LTG);10 days  -EG     Row Name 01/04/23 0809          Problem Specific Goal 1 (OT)    Problem Specific Goal 1 (OT) Patient will improve functional endurance to Good- for ADL's to return home at [facility].  -EG     Time Frame (Problem Specific Goal 1, OT) long term goal (LTG);10 days  -EG     Row Name 01/04/23 0809          Therapy Assessment/Plan (OT)    Planned Therapy Interventions (OT) activity tolerance training;functional balance retraining;occupation/activity based interventions;BADL retraining;patient/caregiver education/training;transfer/mobility retraining;strengthening exercise;adaptive equipment training  -EG           User Key  (r) = Recorded By, (t) = Taken By, (c) = Cosigned By    Initials Name Provider Type    EG Rayna Espinal, OT Occupational Therapist               Clinical Impression     Row Name 01/04/23 0807          Pain  Assessment    Pretreatment Pain Rating 0/10 - no pain  -EG     Posttreatment Pain Rating 0/10 - no pain  -EG     Row Name 01/04/23 0807          Plan of Care Review    Progress no change  -EG     Outcome Evaluation Patient presents with limitations of fine motor coordination, balance/body awareness and functional endurance requiring need for skilled OT services to facilitate return to prior level of function with ADLs.  -EG     Row Name 01/04/23 0807          Therapy Assessment/Plan (OT)    Rehab Potential (OT) good, to achieve stated therapy goals  -EG     Criteria for Skilled Therapeutic Interventions Met (OT) yes;meets criteria;skilled treatment is necessary  -EG     Therapy Frequency (OT) 5 times/wk  -EG     Row Name 01/04/23 0807          Therapy Plan Review/Discharge Plan (OT)    Equipment Needs Upon Discharge (OT) --  Owns rolling walker; concentrator w/ long tubing  -EG     Anticipated Discharge Disposition (OT) home with home health  -EG           User Key  (r) = Recorded By, (t) = Taken By, (c) = Cosigned By    Initials Name Provider Type    EG Rayna Espinal, OT Occupational Therapist               Outcome Measures     Row Name 01/04/23 0811          How much help from another is currently needed...    Putting on and taking off regular lower body clothing? 3  -EG     Bathing (including washing, rinsing, and drying) 3  -EG     Toileting (which includes using toilet bed pan or urinal) 3  -EG     Putting on and taking off regular upper body clothing 4  -EG     Taking care of personal grooming (such as brushing teeth) 4  -EG     Eating meals 4  -EG     AM-PAC 6 Clicks Score (OT) 21  -EG     [facility] Name 01/04/23 0811          Functional Assessment    Outcome Measure Options AM-PAC 6 Clicks Daily Activity (OT);Optimal Instrument  -EG     Row Name 01/04/23 0811          Optimal Instrument    Optimal Instrument Optimal - 3  -EG     Bending/Stooping 2  -EG     Standing 1  -EG     Reaching 1  -EG     From the list,  choose the 3 activities you would most like to be able to do without any difficulty Standing;Bending/stooping;Reaching  -EG     Total Score Optimal - 3 4  -EG           User Key  (r) = Recorded By, (t) = Taken By, (c) = Cosigned By    Initials Name Provider Type    Rayna Luis OT Occupational Therapist                Occupational Therapy Education     Title: PT OT SLP Therapies (Done)     Topic: Occupational Therapy (Done)     Point: ADL training (Done)     Description:   Instruct learner(s) on proper safety adaptation and remediation techniques during self care or transfers.   Instruct in proper use of assistive devices.              Learning Progress Summary           Patient Anamaria, LALITA, VU by EG at 1/4/2023 0812    Comment: OT educated patient on use of call light  OT educated on safety with mobility and transfers                   Point: Home exercise program (Done)     Description:   Instruct learner(s) on appropriate technique for monitoring, assisting and/or progressing therapeutic exercises/activities.              Learning Progress Summary           Patient Anamaria, E, VU by EG at 1/4/2023 0812    Comment: OT educated patient on use of call light  OT educated on safety with mobility and transfers                   Point: Precautions (Done)     Description:   Instruct learner(s) on prescribed precautions during self-care and functional transfers.              Learning Progress Summary           Patient Alexandroer, E, VU by EG at 1/4/2023 0812    Comment: OT educated patient on use of call light  OT educated on safety with mobility and transfers                   Point: Body mechanics (Done)     Description:   Instruct learner(s) on proper positioning and spine alignment during self-care, functional mobility activities and/or exercises.              Learning Progress Summary           Patient Anamaria, E, VU by EG at 1/4/2023 0812    Comment: OT educated patient on use of call light  OT educated on safety with  mobility and transfers                               User Key     Initials Effective Dates Name Provider Type Discipline    EG 09/14/22 -  Rayna Espinal OT Occupational Therapist OT              OT Recommendation and Plan  Planned Therapy Interventions (OT): activity tolerance training, functional balance retraining, occupation/activity based interventions, BADL retraining, patient/caregiver education/training, transfer/mobility retraining, strengthening exercise, adaptive equipment training  Therapy Frequency (OT): 5 times/wk  Plan of Care Review  Progress: no change  Outcome Evaluation: Patient presents with limitations of fine motor coordination, balance/body awareness and functional endurance requiring need for skilled OT services to facilitate return to prior level of function with ADLs.     Time Calculation:    Time Calculation- OT     Row Name 01/04/23 0815             Time Calculation- OT    OT Received On 01/04/23  -EG      OT Goal Re-Cert Due Date 01/13/23  -EG         Untimed Charges    OT Eval/Re-eval Minutes 32  -EG         Total Minutes    Untimed Charges Total Minutes 32  -EG       Total Minutes 32  -EG            User Key  (r) = Recorded By, (t) = Taken By, (c) = Cosigned By    Initials Name Provider Type    EG Rayna Espinal OT Occupational Therapist              Therapy Charges for Today     Code Description Service Date Service Provider Modifiers Qty    95444734716 HC OT EVAL MOD COMPLEXITY 3 1/4/2023 Rayna Espinal OT GO 1               Rayna Espinal OT  1/4/2023

## 2023-01-04 NOTE — PLAN OF CARE
Goal Outcome Evaluation:           Progress: no change  Outcome Evaluation: Patient presents with limitations of fine motor coordination, balance/body awareness and functional endurance requiring need for skilled OT services to facilitate return to prior level of function with ADLs.    Discharge Recommendations: home with home health

## 2023-01-04 NOTE — THERAPY EVALUATION
Acute Care - Speech Language Pathology   Swallow Initial Evaluation  Cynthia     Patient Name: Tyrone Garcia  : 1960  MRN: [id number]  Today's Date: 2023               Admit Date: 2023    Visit Dx:     ICD-10-CM ICD-9-CM   1. Left sided numbness  R20.0 782.0   2. Uncontrolled hypertension  I10 401.9   3. TIA (transient ischemic attack)  G45.9 435.9   4. Chronic obstructive pulmonary disease, unspecified COPD type (HCC)  J44.9 496   5. Pneumonia of both lungs due to infectious organism, unspecified part of lung  J18.9 483.8   6. Difficulty in walking  R26.2 719.7     Patient Active Problem List   Diagnosis   • Left sided numbness     Past Medical History:   Diagnosis Date   • COPD (chronic obstructive pulmonary disease) (HCC)    • Diabetes (HCC)     TYPE 1   • Hypertension      Past Surgical History:   Procedure Laterality Date   • ABDOMINAL HERNIA REPAIR     • EYE SURGERY      X 4 FOR BLEEDING BEHIND BILATERAL EYES       PAIN SCALE: None noted    PRECAUTIONS/CONTRAINDICATIONS: None noted    SUSPECTED ABUSE/NEGLECT/EXPLOITATION: None noted    SOCIAL/PSYCHOLOGICAL NEEDS/BARRIERS: None noted    PAST SOCIAL HISTORY: Lives at home    PRIOR FUNCTION: Independent     PATIENT GOALS/EXPECTATIONS: Did not report    HISTORY: This patient is a 62-year-old male admitted to rule out CVA.    CURRENT DIET LEVEL: Regular diet    OBJECTIVE:    TEST ADMINISTERED: Clinical dysphagia evaluation    COGNITION/SAFETY AWARENESS: Alert and oriented    BEHAVIORAL OBSERVATIONS: Pleasant and cooperative    ORAL MOTOR EXAM: Lingual and labial strength and range of motion     VOICE QUALITY: WITHIN FUNCTIONAL LIMITS.     REFLEX EXAM: Deferred    POSTURE: 90 degrees upright    FEEDING/SWALLOWING FUNCTION: Assessed with thin liquids, purée consistencies and regular solids    CLINICAL OBSERVATIONS: Oral stage is characterized by good bolus preparation control.  Timely oral transit.  Pharyngeal phase is timely with good  laryngeal elevation per palpation.  No clinical signs or symptoms of aspiration noted.  Vocal quality remained clear to auscultation.    DYSPHAGIA CRITERIA: n/a    FUNCTIONAL ASSESSMENT INSTRUMENT: Patient currently scored a level 7 of 7 on Functional Communication Measures for swallowing indicating a 0% limitation in function.    ASSESSMENT/ PLAN OF CARE:  Pt presents with functional oropharyngeal swallow.  No clinical signs or symptoms of aspiration noted.  Vocal quality remained clear to auscultation.    REHAB POTENTIAL:  Pt has good rehab potential.  The following limitations may influence improvement/ length of tx medical status.    RECOMMENDATIONS:   1.   DIET: Regular diet and thin liquids    2.  POSITION: 90 degrees upright for all intake    3.  COMPENSATORY STRATEGIES: General swallow precautions  No further dysphagia therapy is indicated at this time.  Available for reconsult should patient's medical status warrant.  Pt/responsible party agrees with plan of care and has been informed of all alternatives, risks and benefits.        EDUCATION  The patient has been educated in the following areas:   Dysphagia (Swallowing Impairment).     Shahida Gomez, SLP  1/4/2023

## 2023-01-05 LAB
ANION GAP SERPL CALCULATED.3IONS-SCNC: 6.4 MMOL/L (ref 5–15)
BUN SERPL-MCNC: 15 MG/DL (ref 8–23)
BUN/CREAT SERPL: 20.3 (ref 7–25)
CALCIUM SPEC-SCNC: 9.3 MG/DL (ref 8.6–10.5)
CHLORIDE SERPL-SCNC: 100 MMOL/L (ref 98–107)
CO2 SERPL-SCNC: 32.6 MMOL/L (ref 22–29)
CREAT SERPL-MCNC: 0.74 MG/DL (ref 0.76–1.27)
DEPRECATED RDW RBC AUTO: 42.9 FL (ref 37–54)
EGFRCR SERPLBLD CKD-EPI 2021: 101.8 ML/MIN/1.73
ERYTHROCYTE [DISTWIDTH] IN BLOOD BY AUTOMATED COUNT: 13.2 % (ref 12.3–15.4)
GLUCOSE BLDC GLUCOMTR-MCNC: 137 MG/DL (ref 70–99)
GLUCOSE BLDC GLUCOMTR-MCNC: 167 MG/DL (ref 70–99)
GLUCOSE BLDC GLUCOMTR-MCNC: 238 MG/DL (ref 70–99)
GLUCOSE BLDC GLUCOMTR-MCNC: 46 MG/DL (ref 70–99)
GLUCOSE BLDC GLUCOMTR-MCNC: 51 MG/DL (ref 70–99)
GLUCOSE BLDC GLUCOMTR-MCNC: 85 MG/DL (ref 70–99)
GLUCOSE SERPL-MCNC: 79 MG/DL (ref 65–99)
HCT VFR BLD AUTO: 42.8 % (ref 37.5–51)
HGB BLD-MCNC: 14 G/DL (ref 13–17.7)
MCH RBC QN AUTO: 29.2 PG (ref 26.6–33)
MCHC RBC AUTO-ENTMCNC: 32.7 G/DL (ref 31.5–35.7)
MCV RBC AUTO: 89.4 FL (ref 79–97)
PLATELET # BLD AUTO: 257 10*3/MM3 (ref 140–450)
PMV BLD AUTO: 9 FL (ref 6–12)
POTASSIUM SERPL-SCNC: 4.3 MMOL/L (ref 3.5–5.2)
RBC # BLD AUTO: 4.79 10*6/MM3 (ref 4.14–5.8)
SODIUM SERPL-SCNC: 139 MMOL/L (ref 136–145)
WBC NRBC COR # BLD: 9.14 10*3/MM3 (ref 3.4–10.8)

## 2023-01-05 PROCEDURE — 85027 COMPLETE CBC AUTOMATED: CPT | Performed by: FAMILY MEDICINE

## 2023-01-05 PROCEDURE — 94799 UNLISTED PULMONARY SVC/PX: CPT

## 2023-01-05 PROCEDURE — 25010000002 AMPICILLIN-SULBACTAM PER 1.5 G: Performed by: FAMILY MEDICINE

## 2023-01-05 PROCEDURE — 80048 BASIC METABOLIC PNL TOTAL CA: CPT | Performed by: FAMILY MEDICINE

## 2023-01-05 PROCEDURE — 25010000002 FUROSEMIDE PER 20 MG: Performed by: FAMILY MEDICINE

## 2023-01-05 PROCEDURE — 25010000002 ENOXAPARIN PER 10 MG: Performed by: INTERNAL MEDICINE

## 2023-01-05 PROCEDURE — 82962 GLUCOSE BLOOD TEST: CPT

## 2023-01-05 PROCEDURE — 99232 SBSQ HOSP IP/OBS MODERATE 35: CPT | Performed by: SURGERY

## 2023-01-05 PROCEDURE — 99233 SBSQ HOSP IP/OBS HIGH 50: CPT | Performed by: FAMILY MEDICINE

## 2023-01-05 RX ORDER — CHOLECALCIFEROL (VITAMIN D3) 125 MCG
5 CAPSULE ORAL NIGHTLY
Status: DISCONTINUED | OUTPATIENT
Start: 2023-01-05 | End: 2023-01-07 | Stop reason: HOSPADM

## 2023-01-05 RX ORDER — FUROSEMIDE 10 MG/ML
40 INJECTION INTRAMUSCULAR; INTRAVENOUS ONCE
Status: COMPLETED | OUTPATIENT
Start: 2023-01-05 | End: 2023-01-05

## 2023-01-05 RX ADMIN — OSELTAMIVIR PHOSPHATE 75 MG: 75 CAPSULE ORAL at 09:16

## 2023-01-05 RX ADMIN — AMPICILLIN SODIUM AND SULBACTAM SODIUM 3 G: 2; 1 INJECTION, POWDER, FOR SOLUTION INTRAMUSCULAR; INTRAVENOUS at 08:09

## 2023-01-05 RX ADMIN — ACETAMINOPHEN 650 MG: 325 TABLET ORAL at 05:12

## 2023-01-05 RX ADMIN — AMPICILLIN SODIUM AND SULBACTAM SODIUM 3 G: 2; 1 INJECTION, POWDER, FOR SOLUTION INTRAMUSCULAR; INTRAVENOUS at 21:18

## 2023-01-05 RX ADMIN — Medication 10 ML: at 21:18

## 2023-01-05 RX ADMIN — ASPIRIN 81 MG: 81 TABLET, COATED ORAL at 09:16

## 2023-01-05 RX ADMIN — AMPICILLIN SODIUM AND SULBACTAM SODIUM 3 G: 2; 1 INJECTION, POWDER, FOR SOLUTION INTRAMUSCULAR; INTRAVENOUS at 02:05

## 2023-01-05 RX ADMIN — Medication 5 MG: at 21:18

## 2023-01-05 RX ADMIN — FUROSEMIDE 40 MG: 10 INJECTION, SOLUTION INTRAMUSCULAR; INTRAVENOUS at 16:24

## 2023-01-05 RX ADMIN — OSELTAMIVIR PHOSPHATE 75 MG: 75 CAPSULE ORAL at 21:18

## 2023-01-05 RX ADMIN — AMPICILLIN SODIUM AND SULBACTAM SODIUM 3 G: 2; 1 INJECTION, POWDER, FOR SOLUTION INTRAMUSCULAR; INTRAVENOUS at 13:54

## 2023-01-05 RX ADMIN — ATORVASTATIN CALCIUM 80 MG: 40 TABLET, FILM COATED ORAL at 21:18

## 2023-01-05 RX ADMIN — ENOXAPARIN SODIUM 40 MG: 100 INJECTION SUBCUTANEOUS at 09:15

## 2023-01-05 NOTE — PLAN OF CARE
Goal Outcome Evaluation: Blood sugars 46, 51, 85, and 167 respectively. No co this shift.

## 2023-01-05 NOTE — PROGRESS NOTES
[facility]   Hospitalist Progress Note  Date: 2023  Patient Name: Tyrone Garcia  : 1960  MRN: [id number]  Date of admission: 2023      Subjective   Subjective     Chief Complaint: Left-sided weakness    Summary:Tyrone Garcia is a 62 y.o. male with a history of COPD, diabetes, hypertension patient presents with left-sided weakness.  Patient started with a coughing fit and headache day prior to admission.  Patient states he woke up with heaviness on his left side.  Patient's influenza came back as indeterminate, therefore this indicates patient is positive for both influenza A and influenza B.  Initial blood cultures positive for gram-positive cocci speciation pending.  Repeat cultures obtained.  Started on empiric antibiotics. Patient's chest x-ray represents multifocal pneumonia and a CT of his carotids shows large pulmonary nodules therefore CT chest was ordered and confirmed new or increased bilateral pulmonary nodules.  Patient has been having these worked up and followed by Dr. Cisse in [place] and had recent bronchoscopy.  Patient's MRI brain shows subacute infarcts on the right frontal region, patient found to have 78% stenosis of the proximal right internal carotid artery CT angiogram.  Neurology was consulted on the case, vascular surgery was consulted on the case.  Ultrasound the carotids demonstrated large dense right carotid bulb plaque with high-grade stenosis as well as severe stenosis of the proximal right internal carotid artery.  Vascular surgery planning to address after infection resolved    Interval Followup: Patient sitting up in chair at the bedside resting comfortably with family.  Patient reports shortness of breath worse today with activity.  Patient states left sided weakness is improving able to pick some things up and do some paperwork today.  Blood sugars fairly well controlled.  Sinus rhythm 80s -100s on telemetry review.  Satting well  on 2 L nasal cannula.  No other issues per nursing.    Review of Systems  Constitutional: Negative for fatigue and fever.   HENT: Negative for sore throat and trouble swallowing.    Eyes: Negative for pain and discharge.   Respiratory: Positive for cough and shortness of breath.    Cardiovascular: Negative for chest pain and palpitations.   Gastrointestinal: Negative for abdominal pain, nausea and vomiting.   Endocrine: Negative for cold intolerance and heat intolerance.   Genitourinary: Negative for difficulty urinating and dysuria.   Musculoskeletal: Negative for back pain and neck stiffness.   Skin: Negative for color change and rash.   Neurological: Negative for syncope and headaches.  Left-sided weakness  Hematological: Negative for adenopathy.   Psychiatric/Behavioral: Negative for confusion and hallucinations.    Objective   Objective     Vitals:   Temp:  [97.2 °F (36.2 °C)-97.8 °F (36.6 °C)] 97.2 °F (36.2 °C)  Heart Rate:  [83-89] 89  Resp:  [16-25] 16  BP: (154-173)/(79-92) 154/80  Flow (L/min):  [2] 2  Physical Exam   Gen. well-developed appearing stated age in no acute distress  HEENT: Normocephalic atraumatic moist membranes pupils equal round reactive light, no scleral icterus no conjunctival injection  Cardiovascular: regular rate and rhythm no murmurs rubs or gallops S1-S2, no lower extremity edema appreciated  Pulmonary: Clear to auscultation bilaterally crackles bilaterally with scant expiratory wheezes, no rhonchi symmetric chest expansion, unlabored, no conversational dyspnea appreciated  Gastrointestinal: Soft nontender nondistended positive bowel sounds all 4 quadrants no rebound or guarding  Musculoskeletal: No clubbing cyanosis, warm and well-perfused, calves soft symmetric nontender bilaterally  Skin: Clean dry without rashs  Neuro: Cranial nerves II through XII intact grossly no sensorimotor deficits appreciated bilateral upper and lower extremities, 4 out of 5 left upper extremity strength  on flexion extension  Psych: Patient is calm cooperative and appropriate with exam not responding to internal stimuli  : No Verduzco catheter no bladder distention no suprapubic tenderness    Result Review    Result Review:  I have personally reviewed these results and agree with these findings:  [x]  Laboratory  LAB RESULTS:      Lab 01/05/23 0429 01/03/23 0341 01/02/23 0346 01/01/23 2105   WBC 9.14 7.77  --  10.41   HEMOGLOBIN 14.0 14.4  --  14.9   HEMATOCRIT 42.8 43.3  --  45.9   PLATELETS 257 285  --  344   NEUTROS ABS  --   --   --  6.84   IMMATURE GRANS (ABS)  --   --   --  0.03   LYMPHS ABS  --   --   --  2.17   MONOS ABS  --   --   --  0.89   EOS ABS  --   --   --  0.38   MCV 89.4 88.9  --  91.8   PROCALCITONIN  --   --   --  0.05   LACTATE  --   --  0.5  --    PROTIME  --   --   --  13.1         Lab 01/05/23 0429 01/03/23 0335 01/01/23 2105   SODIUM 139 141 143   POTASSIUM 4.3 3.7 4.5   CHLORIDE 100 102 101   CO2 32.6* 31.1* 34.5*   ANION GAP 6.4 7.9 7.5   BUN 15 20 21   CREATININE 0.74* 0.70* 0.78   EGFR 101.8 103.5 100.2   GLUCOSE 79 41* 110*   CALCIUM 9.3 9.1 9.5   MAGNESIUM  --  2.0 2.0   HEMOGLOBIN A1C  --   --  8.50*         Lab 01/01/23 2105   TOTAL PROTEIN 7.7   ALBUMIN 4.0   GLOBULIN 3.7   ALT (SGPT) 12   AST (SGOT) 17   BILIRUBIN 0.3   ALK PHOS 117         Lab 01/01/23 2105   PROBNP 429.3   PROTIME 13.1   INR 0.98         Lab 01/01/23 2105   CHOLESTEROL 201*   LDL CHOL 109*   HDL CHOL 76*   TRIGLYCERIDES 90             Brief Urine Lab Results     None        Microbiology Results (last 10 days)     Procedure Component Value - Date/Time    Blood Culture - Blood, Arm, Left [[id number]]  (Normal) Collected: 01/03/23 0335    Lab Status: Preliminary result Specimen: Blood from Arm, Left Updated: 01/05/23 0345     Blood Culture No growth at 2 days    Blood Culture - Blood, Hand, Left [[id number]]  (Normal) Collected: 01/03/23 0334    Lab Status: Preliminary result Specimen: Blood from Hand, Left  Updated: 01/05/23 0345     Blood Culture No growth at 2 days    Blood Culture - Blood, Arm, Left [[id number]]  (Abnormal) Collected: 01/02/23 0346    Lab Status: Preliminary result Specimen: Blood from Arm, Left Updated: 01/05/23 0756     Blood Culture Staphylococcus, coagulase negative     Isolated from Aerobic and Anaerobic Bottles     Gram Stain Aerobic Bottle Gram positive cocci in clusters      Anaerobic Bottle Gram positive cocci in clusters    Blood Culture - Blood, Arm, Left [[id number]]  (Abnormal) Collected: 01/02/23 0346    Lab Status: Preliminary result Specimen: Blood from Arm, Left Updated: 01/05/23 0757     Blood Culture Staphylococcus hominis ssp hominis     Isolated from Aerobic Bottle     Gram Stain Aerobic Bottle Gram positive cocci in clusters    Blood Culture ID, PCR - Blood, Arm, Left [[id number]]  (Abnormal) Collected: 01/02/23 0346    Lab Status: Final result Specimen: Blood from Arm, Left Updated: 01/03/23 0339     BCID, PCR Staph spp, not aureus or lugdunensis. Identification by BCID2 PCR.    Influenza Antigen, Rapid - Swab, Nasopharynx [[id number]]  (Abnormal) Collected: 01/02/23 0152    Lab Status: Final result Specimen: Swab from Nasopharynx Updated: 01/02/23 0242     Influenza A Ag, EIA Indeterminate     Influenza B Ag, EIA Indeterminate    COVID-19,APTIMA PANTHER(LINETTE),[facility], NP/OP SWAB IN UTM/VTM/[facility]ALINE TRANSPORT MEDIA,24 HR TAT - Swab, Nasal Cavity [[id number]]  (Normal) Collected: 01/02/23 0049    Lab Status: Final result Specimen: Swab from Nasal Cavity Updated: 01/02/23 0524     COVID19 Not Detected    Narrative:      Fact sheet for providers: https://www.fda.gov/media/868934/download     Fact sheet for patients: https://www.fda.gov/media/560818/download    Test performed by RT PCR.    Influenza Antigen, Rapid - Swab, Nasopharynx [[id number]]  (Abnormal) Collected: 01/02/23 0049    Lab Status: Final result Specimen: Swab from Nasopharynx Updated: 01/02/23 0140     Influenza A Ag,  EIA Indeterminate     Influenza B Ag, EIA Indeterminate          [x]  Microbiology  [x]  Radiology  CT Angiogram Neck    Result Date: 1/2/2023    1. There is 78 percent stenosis within the proximal RIGHT internal carotid artery by NASCET criteria.  2. There is 47 percent stenosis within the proximal LEFT internal carotid artery by NASCET criteria.  3. No hemodynamically significant stenoses are seen elsewhere.  4. No arterial dissection is seen.  5. New or increased pulmonary nodules are seen within the partially imaged upper lung zones.  Many of these are cavitating or may represent thick-walled pulmonary cysts.  The findings are age-indeterminate.  An age-indeterminate infectious/inflammatory respiratory process is possible.  For instance, age-indeterminate granulomatous disease would be in the differential diagnosis, including, but not necessarily limited to fungal pneumonia as well as active pulmonary tuberculosis (mycobacterial pneumonia).  Other types of atypical pulmonary infectious processes cannot be excluded.  Wegener granulomatosis (or granulomatosis with polyangiitis), pulmonary sarcoidosis is, and/or septic emboli cannot be excluded.  6. There is a large bulla in the right pulmonary apex, as seen previously.  It contains air-fluid levels.  It may be infected. 7. There are emphysematous changes of the lungs.  1. 8. Please see above comments for further detail. 1.   Please note that portions of this note were completed with a voice recognition program.  RAMON VALIENTE JR, MD       Electronically Signed and Approved By: RAMON VALIENTE JR, MD on 1/02/2023 at 4:00              CT Chest Without Contrast Diagnostic    Result Date: 1/2/2023     1. New or increased bilateral pulmonary nodules are seen, as discussed.  These findings are noncalcified.  Many of the nodules exhibit cavitation.  The findings are age-indeterminate.  An age-indeterminate infectious/inflammatory respiratory process is possible.  For  instance, age-indeterminate granulomatous disease would be in the differential diagnosis, including, but not necessarily limited to fungal pneumonia as well as active pulmonary tuberculosis (mycobacterial pneumonia).  Other types of atypical pulmonary infectious processes cannot be excluded.  Wegener granulomatosis (or granulomatosis with polyangiitis), pulmonary sarcoidosis is, and/or septic emboli cannot be excluded.  Pulmonary metastases cannot be excluded entirely but are thought to be less likely.  2. Moderate to severe emphysematous changes involve the lungs.  3. No pneumothorax or pneumomediastinum.  4. No pleural or pericardial effusion.  5. No definite filling defects are seen in the chronically dilated central airway.  6. Extensive atherosclerotic changes are noted, including involvement of the coronary arteries.  7. No cardiac enlargement.  8. Please see above comments for further detail.      Please note that portions of this note were completed with a voice recognition program.  RAMON VALIENTE JR, MD       Electronically Signed and Approved By: RAMON VALIENTE JR, MD on 1/02/2023 at 19:24              MRI Brain Without Contrast    Result Date: 1/2/2023    MR examination of the brain without IV contrast demonstrating mild atrophy and white matter changes.  Gyriform subacute infarcts are seen in the high right frontal region, with bright signal on diffusion-weighted images and restricted diffusion.  1 cm focus of encephalomalacia in the posterior limb of the internal capsule on the right is consistent with an older infarct.  I discussed findings with Dr. fuentes at 1500 hours.      JACQUELINE DAVIS MD       Electronically Signed and Approved By: JACQUELINE DAVIS MD on 1/02/2023 at 14:58             XR Chest 1 View    Result Date: 1/2/2023   New bilateral infiltrates are seen.  The findings may represent infectious multifocal pneumonia.       Please note that portions of this note were completed with a voice  recognition program.  RAMON VALIENTE JR, MD       Electronically Signed and Approved By: RAMON VALIENTE JR, MD on 1/02/2023 at 0:46              CT Angiogram Head w AI Analysis of LVO    Result Date: 1/2/2023   No emergent large vessel occlusion is identified.     Please note that portions of this note were completed with a voice recognition program.  RAMON VALIENTE JR, MD       Electronically Signed and Approved By: RAMON VALIENTE JR, MD on 1/02/2023 at 3:52                [x]  EKG/Telemetry   []  Cardiology/Vascular   []  Pathology  []  Old records  [x]  Other:  Scheduled Meds:ampicillin-sulbactam, 3 g, Intravenous, Q6H  aspirin, 81 mg, Oral, Daily  atorvastatin, 80 mg, Oral, Nightly  enoxaparin, 40 mg, Subcutaneous, Daily  melatonin, 5 mg, Oral, Nightly  oseltamivir, 75 mg, Oral, Q12H  sodium chloride, 10 mL, Intravenous, Q12H      Continuous Infusions:insulin,       PRN Meds:.•  acetaminophen  •  albuterol  •  dextrose  •  dextrose  •  glucagon (human recombinant)  •  nitroglycerin  •  [COMPLETED] Insert peripheral IV **AND** sodium chloride  •  sodium chloride  •  sodium chloride      Assessment & Plan   Assessment / Plan     Assessment/Plan:  Acute stroke, ischemic  Severe right carotid artery stenosis  Pulmonary nodules/pulmonary cysts  Influenza A, influenza B positive   COPD without acute exacerbation  Insulin-dependent diabetes  Hypertension  Ruled out bacteremia     Plan:  Patient admitted to hospital for further care management  Neurology consulted, appreciate assistance  Vascular surgery consulted, appreciate assistance  Continue aspirin  Continue high intensity statin  Continue to allow for permissive hypertension due to carotid stenosis with stroke  Plan for right endarterectomy once infection resolves tentatively on Monday per Vascular Surgery   Pulmonary nodules being followed by patient's outpatient pulmonologist in [place]  Patient remains in isolation for influenza positive status  Continue  Tamiflu to complete 5 day course  Continue as needed DuoNebs  Give Lasix IV x1  Continue to hold home lisinopril  Continue unasyn for pna coverage   Continue home insulin pump, will closely monitor CBGs while inpatient  Further inpatient orders recommendations pending clinical course        Discussed plan with bedside RN and vascular surgery attending.    Disposition: Likely home after endarterectomy next week.     DVT prophylaxis:  Medical DVT prophylaxis orders are present.    CODE STATUS:   Code Status (Patient has no pulse and is not breathing): CPR (Attempt to Resuscitate)  Medical Interventions (Patient has pulse or is breathing): Full Support

## 2023-01-05 NOTE — PLAN OF CARE
Goal Outcome Evaluation:   IV ABT continue.  Precautions continued.  No other new changes.

## 2023-01-05 NOTE — PROGRESS NOTES
[facility]   Progress Note    Patient Name: Tyrone Garcia  : 1960  MRN: [id number]  Primary Care Physician:  Mana Stafford APRN  Date of admission: 2023    Subjective   Subjective         HPI:  Patient Reports no new issues.    Review of Systems   Within normal limits unless otherwise noted in HPI.     Objective   Objective     Vitals:   Temp:  [97.2 °F (36.2 °C)-98.2 °F (36.8 °C)] 97.8 °F (36.6 °C)  Heart Rate:  [56-88] 87  Resp:  [16-25] 18  BP: (135-173)/(74-92) 172/92  Flow (L/min):  [2] 2  Physical Exam    Constitutional: Awake, alert   Eyes: PERRLA, sclerae anicteric, no conjunctival injection   HENT: NCAT, mucous membranes moist   Neck: Supple, no thyromegaly, no lymphadenopathy, trachea midline   Respiratory: Clear to auscultation bilaterally, nonlabored respirations    Cardiovascular: RRR, no murmurs, rubs, or gallops, palpable pedal pulses bilaterally   Gastrointestinal: Positive bowel sounds, soft, nontender, nondistended   Musculoskeletal: No bilateral ankle edema, no clubbing or cyanosis to extremities   Psychiatric: Appropriate affect, cooperative   Neurologic: Oriented x 3, strength symmetric in all extremities, Cranial Nerves grossly intact to confrontation, speech clear   Skin: No rashes    Vascular: Palpable femoral pulses.  Palpable popliteal and pedal pulses.  No foot wounds or evidence of ischemia.        Result Review    Result Review:  I have personally reviewed the results from the time of this admission to 2023 07:45 EST and agree with these findings:  [x]  Laboratory list / accordion  [x]  Microbiology  [x]  Radiology  []  EKG/Telemetry   [x]  Cardiology/Vascular   []  Pathology  []  Old records  []  Other:        Assessment & Plan   Assessment / Plan     Brief Patient Summary:  Tyrone Garcia is a 62 y.o. male who presents with symptomatic high-grade right carotid stenosis with incidental finding of bacteremia which is most likely a contaminant as  well as recurrent pneumonia.    Active Hospital Problems:  Active Hospital Problems    Diagnosis    • **Left sided numbness      Plan:   Complete antibiotic course.  Continue medical management.  Continue high-dose statin and antiplatelet therapy.  Vascular surgery will follow.    DVT prophylaxis:  Medical DVT prophylaxis orders are present.    CODE STATUS:   Code Status (Patient has no pulse and is not breathing): CPR (Attempt to Resuscitate)  Medical Interventions (Patient has pulse or is breathing): Full Support    Disposition:  I expect patient to be discharged per clinical status and medical team.    Maggie Spencer MD

## 2023-01-06 ENCOUNTER — APPOINTMENT (OUTPATIENT)
Dept: CARDIOLOGY | Facility: HOSPITAL | Age: 63
DRG: 64 | End: 2023-01-06
Payer: MEDICARE

## 2023-01-06 ENCOUNTER — PREP FOR SURGERY (OUTPATIENT)
Dept: OTHER | Facility: HOSPITAL | Age: 63
End: 2023-01-06
Payer: MEDICARE

## 2023-01-06 DIAGNOSIS — I65.21 STENOSIS OF RIGHT CAROTID ARTERY: Primary | ICD-10-CM

## 2023-01-06 PROBLEM — I65.29 CAROTID ARTERY STENOSIS: Status: ACTIVE | Noted: 2023-01-06

## 2023-01-06 LAB
BACTERIA SPEC AEROBE CULT: ABNORMAL
BACTERIA SPEC AEROBE CULT: ABNORMAL
GLUCOSE BLDC GLUCOMTR-MCNC: 137 MG/DL (ref 70–99)
GLUCOSE BLDC GLUCOMTR-MCNC: 143 MG/DL (ref 70–99)
GLUCOSE BLDC GLUCOMTR-MCNC: 149 MG/DL (ref 70–99)
GLUCOSE BLDC GLUCOMTR-MCNC: 184 MG/DL (ref 70–99)
GRAM STN SPEC: ABNORMAL
ISOLATED FROM: ABNORMAL
ISOLATED FROM: ABNORMAL

## 2023-01-06 PROCEDURE — 99233 SBSQ HOSP IP/OBS HIGH 50: CPT | Performed by: FAMILY MEDICINE

## 2023-01-06 PROCEDURE — 93306 TTE W/DOPPLER COMPLETE: CPT

## 2023-01-06 PROCEDURE — 25010000002 ENOXAPARIN PER 10 MG: Performed by: INTERNAL MEDICINE

## 2023-01-06 PROCEDURE — 25010000002 AMPICILLIN-SULBACTAM PER 1.5 G: Performed by: FAMILY MEDICINE

## 2023-01-06 PROCEDURE — 99232 SBSQ HOSP IP/OBS MODERATE 35: CPT | Performed by: SURGERY

## 2023-01-06 PROCEDURE — 94799 UNLISTED PULMONARY SVC/PX: CPT

## 2023-01-06 PROCEDURE — 82962 GLUCOSE BLOOD TEST: CPT

## 2023-01-06 RX ORDER — CEFAZOLIN SODIUM 2 G/100ML
2 INJECTION, SOLUTION INTRAVENOUS ONCE
Status: CANCELLED | OUTPATIENT
Start: 2023-01-06 | End: 2023-01-06

## 2023-01-06 RX ORDER — GUAIFENESIN/DEXTROMETHORPHAN 100-10MG/5
10 SYRUP ORAL EVERY 4 HOURS PRN
Status: DISCONTINUED | OUTPATIENT
Start: 2023-01-06 | End: 2023-01-07 | Stop reason: HOSPADM

## 2023-01-06 RX ADMIN — ASPIRIN 81 MG: 81 TABLET, COATED ORAL at 08:06

## 2023-01-06 RX ADMIN — OSELTAMIVIR PHOSPHATE 75 MG: 75 CAPSULE ORAL at 08:06

## 2023-01-06 RX ADMIN — AMPICILLIN SODIUM AND SULBACTAM SODIUM 3 G: 2; 1 INJECTION, POWDER, FOR SOLUTION INTRAMUSCULAR; INTRAVENOUS at 20:34

## 2023-01-06 RX ADMIN — AMPICILLIN SODIUM AND SULBACTAM SODIUM 3 G: 2; 1 INJECTION, POWDER, FOR SOLUTION INTRAMUSCULAR; INTRAVENOUS at 13:48

## 2023-01-06 RX ADMIN — ENOXAPARIN SODIUM 40 MG: 100 INJECTION SUBCUTANEOUS at 08:06

## 2023-01-06 RX ADMIN — Medication 5 MG: at 20:37

## 2023-01-06 RX ADMIN — AMPICILLIN SODIUM AND SULBACTAM SODIUM 3 G: 2; 1 INJECTION, POWDER, FOR SOLUTION INTRAMUSCULAR; INTRAVENOUS at 02:54

## 2023-01-06 RX ADMIN — OSELTAMIVIR PHOSPHATE 75 MG: 75 CAPSULE ORAL at 20:37

## 2023-01-06 RX ADMIN — AMPICILLIN SODIUM AND SULBACTAM SODIUM 3 G: 2; 1 INJECTION, POWDER, FOR SOLUTION INTRAMUSCULAR; INTRAVENOUS at 08:06

## 2023-01-06 RX ADMIN — Medication 10 ML: at 08:07

## 2023-01-06 RX ADMIN — Medication 10 ML: at 20:38

## 2023-01-06 RX ADMIN — ATORVASTATIN CALCIUM 80 MG: 40 TABLET, FILM COATED ORAL at 20:37

## 2023-01-06 RX ADMIN — GUAIFENESIN AND DEXTROMETHORPHAN 10 ML: 100; 10 SYRUP ORAL at 22:59

## 2023-01-06 NOTE — PROGRESS NOTES
[facility]  Date: 2023  Patient Name: Tyrone Garcia  : 1960  MRN: [id number]  Date of admission: 2023      Subjective   Subjective     Chief Complaint: Left-sided weakness    Summary:Tyrone Garcia is a 62 y.o. male with a history of COPD, diabetes, hypertension patient presents with left-sided weakness.  Patient started with a coughing fit and headache day prior to admission.  Patient states he woke up with heaviness on his left side.  Patient's influenza came back as indeterminate, therefore this indicates patient is positive for both influenza A and influenza B.  Initial blood cultures positive for gram-positive cocci speciation pending.  Repeat cultures obtained.  Started on empiric antibiotics. Patient's chest x-ray represents multifocal pneumonia and a CT of his carotids shows large pulmonary nodules therefore CT chest was ordered and confirmed new or increased bilateral pulmonary nodules.  Patient has been having these worked up and followed by Dr. 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  Patient's MRI brain shows subacute infarcts on the right frontal region, patient found to have 78% stenosis of the proximal right internal carotid artery CT angiogram.  Neurology was consulted on the case, vascular surgery was consulted on the case.  Ultrasound the carotids demonstrated large dense right carotid bulb plaque with high-grade stenosis as well as severe stenosis of the proximal right internal carotid artery.  Vascular surgery planning to perform endarterectomy after infection resolved.  Scheduled for 2023. Will discuss starting itraconazole with Pulmonology. Has follow up with ID at [facility] on 23.     Interval Followup: Patient sitting up in chair at the bedside resting comfortably with family.  Patient reports shortness of breath improved today.   Patient states left sided weakness is improving.  Blood sugars fairly well controlled.  Sinus rhythm 80s -100s on telemetry review.  Satting well on 2 L nasal cannula.  Echo pending. No other issues per nursing.    Review of Systems  Constitutional: Negative for fatigue and fever.   HENT: Negative for sore throat and trouble swallowing.    Eyes: Negative for pain and discharge.   Respiratory: Positive for cough and shortness of breath.    Cardiovascular: Negative for chest pain and palpitations.   Gastrointestinal: Negative for abdominal pain, nausea and vomiting.   Endocrine: Negative for cold intolerance and heat intolerance.   Genitourinary: Negative for difficulty urinating and dysuria.   Musculoskeletal: Negative for back pain and neck stiffness.   Skin: Negative for color change and rash.   Neurological: Negative for syncope and headaches.  Left-sided weakness  Hematological: Negative for adenopathy.   Psychiatric/Behavioral: Negative for confusion and hallucinations.    Objective   Objective     Vitals:   Temp:  [97.5 °F (36.4 °C)-98.2 °F (36.8 °C)] 97.7 °F (36.5 °C)  Heart Rate:  [86-96] 91  Resp:  [16-18] 18  BP: (122-145)/(67-87) 139/87  Flow (L/min):  [2] 2  Physical Exam   Gen. well-developed appearing stated age in no acute distress  HEENT: Normocephalic atraumatic moist membranes pupils equal round reactive light, no scleral icterus no conjunctival injection  Cardiovascular: regular rate and rhythm no murmurs rubs or gallops S1-S2, no lower extremity edema appreciated  Pulmonary: Clear to auscultation bilaterally crackles bilaterally with scant expiratory wheezes, no rhonchi symmetric chest expansion, unlabored, no conversational dyspnea appreciated  Gastrointestinal: Soft nontender nondistended positive bowel sounds all 4 quadrants no rebound or guarding  Musculoskeletal: No clubbing cyanosis, warm and well-perfused, calves soft symmetric nontender bilaterally  Skin: Clean dry without rashs  Neuro:  Cranial nerves II through XII intact grossly no sensorimotor deficits appreciated bilateral upper and lower extremities, 4 out of 5 left upper extremity strength on flexion extension  Psych: Patient is calm cooperative and appropriate with exam not responding to internal stimuli  : No Verduzco catheter no bladder distention no suprapubic tenderness    Result Review    Result Review:  I have personally reviewed these results and agree with these findings:  [x]  Laboratory  LAB RESULTS:      Lab 01/05/23  0429 01/03/23  0341 01/02/23  0346 01/01/23 2105   WBC 9.14 7.77  --  10.41   HEMOGLOBIN 14.0 14.4  --  14.9   HEMATOCRIT 42.8 43.3  --  45.9   PLATELETS 257 285  --  344   NEUTROS ABS  --   --   --  6.84   IMMATURE GRANS (ABS)  --   --   --  0.03   LYMPHS ABS  --   --   --  2.17   MONOS ABS  --   --   --  0.89   EOS ABS  --   --   --  0.38   MCV 89.4 88.9  --  91.8   PROCALCITONIN  --   --   --  0.05   LACTATE  --   --  0.5  --    PROTIME  --   --   --  13.1         Lab 01/05/23 0429 01/03/23  0335 01/01/23 2105   SODIUM 139 141 143   POTASSIUM 4.3 3.7 4.5   CHLORIDE 100 102 101   CO2 32.6* 31.1* 34.5*   ANION GAP 6.4 7.9 7.5   BUN 15 20 21   CREATININE 0.74* 0.70* 0.78   EGFR 101.8 103.5 100.2   GLUCOSE 79 41* 110*   CALCIUM 9.3 9.1 9.5   MAGNESIUM  --  2.0 2.0   HEMOGLOBIN A1C  --   --  8.50*         Lab 01/01/23 2105   TOTAL PROTEIN 7.7   ALBUMIN 4.0   GLOBULIN 3.7   ALT (SGPT) 12   AST (SGOT) 17   BILIRUBIN 0.3   ALK PHOS 117         Lab 01/01/23 2105   PROBNP 429.3   PROTIME 13.1   INR 0.98         Lab 01/01/23 2105   CHOLESTEROL 201*   LDL CHOL 109*   HDL CHOL 76*   TRIGLYCERIDES 90             Brief Urine Lab Results     None        Microbiology Results (last 10 days)     Procedure Component Value - Date/Time    Blood Culture - Blood, Arm, Left [[id number]]  (Normal) Collected: 01/03/23 0335    Lab Status: Preliminary result Specimen: Blood from Arm, Left Updated: 01/06/23 0345     Blood Culture No growth  at 3 days    Blood Culture - Blood, Hand, Left [[id number]]  (Normal) Collected: 01/03/23 0334    Lab Status: Preliminary result Specimen: Blood from Hand, Left Updated: 01/06/23 0345     Blood Culture No growth at 3 days    Blood Culture - Blood, Arm, Left [[id number]]  (Abnormal)  (Susceptibility) Collected: 01/02/23 0346    Lab Status: Final result Specimen: Blood from Arm, Left Updated: 01/06/23 0643     Blood Culture Staphylococcus hominis ssp hominis     Isolated from Aerobic and Anaerobic Bottles     Gram Stain Aerobic Bottle Gram positive cocci in clusters      Anaerobic Bottle Gram positive cocci in clusters    Susceptibility      Staphylococcus hominis ssp hominis      JULITO      Oxacillin Resistant     Vancomycin Susceptible                       Susceptibility Comments     Staphylococcus hominis ssp hominis    This isolate does not demonstrate inducible clindamycin resistance in vitro.               Blood Culture - Blood, Arm, Left [[id number]]  (Abnormal) Collected: 01/02/23 0346    Lab Status: Final result Specimen: Blood from Arm, Left Updated: 01/06/23 0645     Blood Culture Staphylococcus hominis ssp hominis     Isolated from Aerobic Bottle     Gram Stain Aerobic Bottle Gram positive cocci in clusters    Narrative:      Refer to previous blood culture collected on 01/02/2023 at 0346 for MICs.    Blood Culture ID, PCR - Blood, Arm, Left [[id number]]  (Abnormal) Collected: 01/02/23 0346    Lab Status: Final result Specimen: Blood from Arm, Left Updated: 01/03/23 0339     BCID, PCR Staph spp, not aureus or lugdunensis. Identification by BCID2 PCR.    Influenza Antigen, Rapid - Swab, Nasopharynx [[id number]]  (Abnormal) Collected: 01/02/23 0152    Lab Status: Final result Specimen: Swab from Nasopharynx Updated: 01/02/23 0242     Influenza A Ag, EIA Indeterminate     Influenza B Ag, EIA Indeterminate    COVID-19,APTIMA PANTHER(LINETTE),[facility], NP/OP SWAB IN UTM/VTM/SALINE TRANSPORT MEDIA,24 HR TAT - Swab,  Nasal Cavity [[id number]]  (Normal) Collected: 01/02/23 0049    Lab Status: Final result Specimen: Swab from Nasal Cavity Updated: 01/02/23 0524     COVID19 Not Detected    Narrative:      Fact sheet for providers: https://www.fda.gov/media/694466/download     Fact sheet for patients: https://www.fda.gov/media/019341/download    Test performed by RT PCR.    Influenza Antigen, Rapid - Swab, Nasopharynx [[id number]]  (Abnormal) Collected: 01/02/23 0049    Lab Status: Final result Specimen: Swab from Nasopharynx Updated: 01/02/23 0140     Influenza A Ag, EIA Indeterminate     Influenza B Ag, EIA Indeterminate          [x]  Microbiology  [x]  Radiology  CT Angiogram Neck    Result Date: 1/2/2023    1. There is 78 percent stenosis within the proximal RIGHT internal carotid artery by NASCET criteria.  2. There is 47 percent stenosis within the proximal LEFT internal carotid artery by NASCET criteria.  3. No hemodynamically significant stenoses are seen elsewhere.  4. No arterial dissection is seen.  5. New or increased pulmonary nodules are seen within the partially imaged upper lung zones.  Many of these are cavitating or may represent thick-walled pulmonary cysts.  The findings are age-indeterminate.  An age-indeterminate infectious/inflammatory respiratory process is possible.  For instance, age-indeterminate granulomatous disease would be in the differential diagnosis, including, but not necessarily limited to fungal pneumonia as well as active pulmonary tuberculosis (mycobacterial pneumonia).  Other types of atypical pulmonary infectious processes cannot be excluded.  Wegener granulomatosis (or granulomatosis with polyangiitis), pulmonary sarcoidosis is, and/or septic emboli cannot be excluded.  6. There is a large bulla in the right pulmonary apex, as seen previously.  It contains air-fluid levels.  It may be infected. 7. There are emphysematous changes of the lungs.  1. 8. Please see above comments for further  detail. 1.   Please note that portions of this note were completed with a voice recognition program.  RAMON VALIENTE JR, MD       Electronically Signed and Approved By: RAMON VALIENTE JR, MD on 1/02/2023 at 4:00              CT Chest Without Contrast Diagnostic    Result Date: 1/2/2023     1. New or increased bilateral pulmonary nodules are seen, as discussed.  These findings are noncalcified.  Many of the nodules exhibit cavitation.  The findings are age-indeterminate.  An age-indeterminate infectious/inflammatory respiratory process is possible.  For instance, age-indeterminate granulomatous disease would be in the differential diagnosis, including, but not necessarily limited to fungal pneumonia as well as active pulmonary tuberculosis (mycobacterial pneumonia).  Other types of atypical pulmonary infectious processes cannot be excluded.  Wegener granulomatosis (or granulomatosis with polyangiitis), pulmonary sarcoidosis is, and/or septic emboli cannot be excluded.  Pulmonary metastases cannot be excluded entirely but are thought to be less likely.  2. Moderate to severe emphysematous changes involve the lungs.  3. No pneumothorax or pneumomediastinum.  4. No pleural or pericardial effusion.  5. No definite filling defects are seen in the chronically dilated central airway.  6. Extensive atherosclerotic changes are noted, including involvement of the coronary arteries.  7. No cardiac enlargement.  8. Please see above comments for further detail.      Please note that portions of this note were completed with a voice recognition program.  RAMON VALIENTE JR, MD       Electronically Signed and Approved By: RAMON VALIENTE JR, MD on 1/02/2023 at 19:24              MRI Brain Without Contrast    Result Date: 1/2/2023    MR examination of the brain without IV contrast demonstrating mild atrophy and white matter changes.  Gyriform subacute infarcts are seen in the high right frontal region, with bright signal on  diffusion-weighted images and restricted diffusion.  1 cm focus of encephalomalacia in the posterior limb of the internal capsule on the right is consistent with an older infarct.  I discussed findings with Dr. fuentes at 1500 hours.      JACQUELINE DAVIS MD       Electronically Signed and Approved By: JACQUELINE DAVIS MD on 1/02/2023 at 14:58             XR Chest 1 View    Result Date: 1/2/2023   New bilateral infiltrates are seen.  The findings may represent infectious multifocal pneumonia.       Please note that portions of this note were completed with a voice recognition program.  RAMON VALIENTE JR, MD       Electronically Signed and Approved By: RAMON VALIENTE JR, MD on 1/02/2023 at 0:46              CT Angiogram Head w AI Analysis of LVO    Result Date: 1/2/2023   No emergent large vessel occlusion is identified.     Please note that portions of this note were completed with a voice recognition program.  RAMON VALIENTE JR, MD       Electronically Signed and Approved By: RAMON VALIENTE JR, MD on 1/02/2023 at 3:52                [x]  EKG/Telemetry   []  Cardiology/Vascular   []  Pathology  []  Old records  [x]  Other:  Scheduled Meds:ampicillin-sulbactam, 3 g, Intravenous, Q6H  aspirin, 81 mg, Oral, Daily  atorvastatin, 80 mg, Oral, Nightly  enoxaparin, 40 mg, Subcutaneous, Daily  melatonin, 5 mg, Oral, Nightly  oseltamivir, 75 mg, Oral, Q12H  sodium chloride, 10 mL, Intravenous, Q12H      Continuous Infusions:insulin,       PRN Meds:.•  acetaminophen  •  albuterol  •  dextrose  •  dextrose  •  glucagon (human recombinant)  •  nitroglycerin  •  [COMPLETED] Insert peripheral IV **AND** sodium chloride  •  sodium chloride  •  sodium chloride      Assessment & Plan   Assessment / Plan     Assessment/Plan:  Acute stroke, ischemic  Severe right carotid artery stenosis  Pulmonary nodules/pulmonary cysts  Influenza A, influenza B positive   COPD without acute exacerbation  Insulin-dependent diabetes  Hypertension  Ruled  out bacteremia  Pulmonary Histoplasmosis     Plan:  Patient admitted to hospital for further care management  Neurology consulted, appreciate assistance  Vascular surgery consulted, appreciate assistance  Continue aspirin  Continue high intensity statin  Continue to allow for permissive hypertension due to carotid stenosis with stroke  Plan for right endarterectomy once infection resolves tentatively on 1/27/23  Pulmonary nodules being followed by patient's outpatient pulmonologist in [place]  Patient remains in isolation for influenza positive status  Continue Tamiflu to complete 5 day course  Continue as needed DuoNebs  Continue to hold home lisinopril  Continue unasyn for pna coverage   Continue home insulin pump, will closely monitor CBGs while inpatient  Will discuss initiation of itraconazole with pulmonology tomorrow.  Further inpatient orders recommendations pending clinical course        Discussed plan with bedside RN and vascular surgery attending.    Disposition: Likely home in the next day or 2    DVT prophylaxis:  Medical DVT prophylaxis orders are present.    CODE STATUS:   Code Status (Patient has no pulse and is not breathing): CPR (Attempt to Resuscitate)  Medical Interventions (Patient has pulse or is breathing): Full Support

## 2023-01-06 NOTE — PLAN OF CARE
Goal Outcome Evaluation:   VSS, no complaints this shift. Pt has marked redness around nose flush with skin. Insulin pump site changed at beginning of shift 1/5. Iv leaking, site change.

## 2023-01-06 NOTE — PROGRESS NOTES
[facility]   Progress Note    Patient Name: Tyrone Garcia  : 1960  MRN: [id number]  Primary Care Physician:  Mana Stafford APRN  Date of admission: 2023    Subjective   Subjective     Chief Complaint: Acute right hemispheric stroke    HPI:  Patient Reports no acute overnight events.  Shortness of breath at rest has improved.  No fevers.  No focal neurologic deficit.  Patient reported receiving a call from infectious disease from [facility].  He has not been able to speak to them upon calling back.    Review of Systems   Within normal limits unless otherwise noted in HPI.     Objective   Objective     Vitals:   Temp:  [97.5 °F (36.4 °C)-98.2 °F (36.8 °C)] 98.2 °F (36.8 °C)  Heart Rate:  [86-96] 94  Resp:  [16-18] 18  BP: (122-146)/(67-87) 146/69  Flow (L/min):  [2] 2  Physical Exam    Constitutional: Awake, alert   Eyes: PERRLA, sclerae anicteric, no conjunctival injection   HENT: NCAT, mucous membranes moist   Neck: Supple, no thyromegaly, no lymphadenopathy, trachea midline   Respiratory: Clear to auscultation bilaterally, nonlabored respirations    Cardiovascular: RRR, no murmurs, rubs, or gallops, palpable pedal pulses bilaterally   Gastrointestinal: Positive bowel sounds, soft, nontender, nondistended   Musculoskeletal: No bilateral ankle edema, no clubbing or cyanosis to extremities   Psychiatric: Appropriate affect, cooperative   Neurologic: Oriented x 3, strength symmetric in all extremities, Cranial Nerves grossly intact to confrontation, speech clear   Skin: No rashes    Vascular: Palpable femoral pulses.  Palpable popliteal and pedal pulses.  No foot wounds or evidence of ischemia.        Result Review    Result Review:  I have personally reviewed the results from the time of this admission to 2023 18:26 EST and agree with these findings:  [x]  Laboratory list / accordion  []  Microbiology  [x]  Radiology  []  EKG/Telemetry   []  Cardiology/Vascular   []   Pathology  []  Old records  []  Other:       Assessment & Plan   Assessment / Plan     Brief Patient Summary:  Tyrone Garcia is a 62 y.o. male who was admitted with acute right hemispheric stroke.  He has bilateral high-grade carotid artery stenosis.  He also has recurrent pneumonia which is being treated with IV antibiotics per pulmonology.    Active Hospital Problems:  Active Hospital Problems    Diagnosis    • **Left sided numbness      Plan:   Complete treatment of pneumonia.  Patient needs pulmonary optimization prior to discharge and perioperative recommendations for management of his severe COPD  He is scheduled for outpatient right carotid endarterectomy.  Vascular surgery will follow.  Patient advised to contact IDU from [facility] and to give us a feedback in view of planned surgery.  DVT prophylaxis:  Medical DVT prophylaxis orders are present.    CODE STATUS:   Code Status (Patient has no pulse and is not breathing): CPR (Attempt to Resuscitate)  Medical Interventions (Patient has pulse or is breathing): Full Support    Disposition:  I expect patient to be discharged per hospitalist and clinical status.    Maggie Spencer MD

## 2023-01-06 NOTE — PLAN OF CARE
Goal Outcome Evaluation:  Plan of Care Reviewed With: patient   Worsening SOA this shift. O2 sats has been 98% NC 2L. Productive coughing noted. No acute distress. VSS.

## 2023-01-07 ENCOUNTER — READMISSION MANAGEMENT (OUTPATIENT)
Dept: CALL CENTER | Facility: HOSPITAL | Age: 63
End: 2023-01-07
Payer: MEDICARE

## 2023-01-07 VITALS
WEIGHT: 138.23 LBS | DIASTOLIC BLOOD PRESSURE: 79 MMHG | HEIGHT: 71 IN | BODY MASS INDEX: 19.35 KG/M2 | TEMPERATURE: 98.3 F | RESPIRATION RATE: 20 BRPM | HEART RATE: 91 BPM | OXYGEN SATURATION: 99 % | SYSTOLIC BLOOD PRESSURE: 150 MMHG

## 2023-01-07 PROBLEM — B39.1: Status: ACTIVE | Noted: 2023-01-07

## 2023-01-07 PROBLEM — I63.9 ACUTE CVA (CEREBROVASCULAR ACCIDENT): Status: ACTIVE | Noted: 2023-01-07

## 2023-01-07 LAB — GLUCOSE BLDC GLUCOMTR-MCNC: 132 MG/DL (ref 70–99)

## 2023-01-07 PROCEDURE — 82962 GLUCOSE BLOOD TEST: CPT

## 2023-01-07 PROCEDURE — 25010000002 AMPICILLIN-SULBACTAM PER 1.5 G: Performed by: FAMILY MEDICINE

## 2023-01-07 PROCEDURE — 99239 HOSP IP/OBS DSCHRG MGMT >30: CPT | Performed by: FAMILY MEDICINE

## 2023-01-07 PROCEDURE — 25010000002 ENOXAPARIN PER 10 MG: Performed by: INTERNAL MEDICINE

## 2023-01-07 RX ORDER — ALBUTEROL SULFATE 2.5 MG/3ML
2.5 SOLUTION RESPIRATORY (INHALATION) EVERY 4 HOURS PRN
Qty: 300 ML | Refills: 0 | Status: SHIPPED | OUTPATIENT
Start: 2023-01-07

## 2023-01-07 RX ORDER — ROSUVASTATIN CALCIUM 20 MG/1
20 TABLET, COATED ORAL DAILY
Qty: 30 TABLET | Refills: 0 | Status: SHIPPED | OUTPATIENT
Start: 2023-01-07 | End: 2023-02-06

## 2023-01-07 RX ORDER — ITRACONAZOLE 100 MG/1
CAPSULE ORAL
Qty: 72 CAPSULE | Refills: 0 | Status: SHIPPED | OUTPATIENT
Start: 2023-01-07 | End: 2023-02-06

## 2023-01-07 RX ORDER — AMOXICILLIN AND CLAVULANATE POTASSIUM 875; 125 MG/1; MG/1
1 TABLET, FILM COATED ORAL 2 TIMES DAILY
Qty: 4 TABLET | Refills: 0 | Status: SHIPPED | OUTPATIENT
Start: 2023-01-07 | End: 2023-01-09

## 2023-01-07 RX ORDER — ASPIRIN 81 MG/1
81 TABLET ORAL DAILY
Qty: 30 TABLET | Refills: 0 | Status: SHIPPED | OUTPATIENT
Start: 2023-01-08 | End: 2023-02-09 | Stop reason: SDUPTHER

## 2023-01-07 RX ADMIN — OSELTAMIVIR PHOSPHATE 75 MG: 75 CAPSULE ORAL at 08:49

## 2023-01-07 RX ADMIN — ENOXAPARIN SODIUM 40 MG: 100 INJECTION SUBCUTANEOUS at 08:49

## 2023-01-07 RX ADMIN — AMPICILLIN SODIUM AND SULBACTAM SODIUM 3 G: 2; 1 INJECTION, POWDER, FOR SOLUTION INTRAMUSCULAR; INTRAVENOUS at 03:14

## 2023-01-07 RX ADMIN — ASPIRIN 81 MG: 81 TABLET, COATED ORAL at 08:49

## 2023-01-07 RX ADMIN — Medication 10 ML: at 08:49

## 2023-01-07 RX ADMIN — AMPICILLIN SODIUM AND SULBACTAM SODIUM 3 G: 2; 1 INJECTION, POWDER, FOR SOLUTION INTRAMUSCULAR; INTRAVENOUS at 08:49

## 2023-01-07 NOTE — DISCHARGE SUMMARY
[facility]         HOSPITALIST  DISCHARGE SUMMARY    Patient Name: Tyrone Garcia  : 1960  MRN: [id number]    Date of Admission: 2023  Date of Discharge: 2023  Primary Care Physician: Mana Stafford APRN    Consults     Date and Time Order Name Status Description    1/3/2023  9:30 AM Inpatient Pulmonology Consult      1/3/2023 12:34 AM Inpatient Vascular Surgery Consult Completed     2023  4:16 AM Inpatient Neurology Consult Stroke Completed     2023  3:24 AM Inpatient Hospitalist Consult            Active and Resolved Hospital Problems:  Acute stroke, ischemic  Severe right carotid artery stenosis  Pulmonary nodules/pulmonary cysts  Influenza A, influenza B positive   COPD without acute exacerbation  Insulin-dependent diabetes  Hypertension  Ruled out bacteremia  Pulmonary Histoplasmosis  Active Hospital Problems    Diagnosis POA   • **Left sided numbness [R20.0] Yes   • Histoplasmosis, 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       Hospital Course     Hospital Course:  Tyrone Garcia is a 62 y.o. male with a history of COPD, diabetes, hypertension patient presents with left-sided weakness.  Patient started with a coughing fit and headache day prior to admission.  Patient states he woke up with heaviness on his left side.  Patient's influenza came back as indeterminate, therefore this indicates patient is positive for both influenza A and influenza B.  Initial blood cultures positive for gram-positive cocci speciation pending.  Repeat cultures obtained.  Started on empiric antibiotics. Patient's chest x-ray represents multifocal pneumonia and a CT of his carotids shows large pulmonary nodules therefore CT chest was ordered and confirmed new or increased bilateral pulmonary nodules.   Patient has been having these worked up and followed by Dr. 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  Patient's MRI brain shows subacute infarcts on the right frontal region, patient found to have 78% stenosis of the proximal right internal carotid artery CT angiogram.  Neurology was consulted on the case, vascular surgery was consulted on the case.  Ultrasound the carotids demonstrated large dense right carotid bulb plaque with high-grade stenosis as well as severe stenosis of the proximal right internal carotid artery.  Vascular surgery planning to perform endarterectomy after infection resolved.  Scheduled for 1/27/2023.  Vascular surgery requesting pulmonary optimization prior to surgery on the 27th.  Patient started on itraconazole due to chronic cavitary histoplasmosis. Has follow up with ID at [facility] on 1/26/23.   Patient seen and evaluated on date of discharge and thought stable for discharge home to follow-up with his primary care provider as well as infectious diseases and vascular surgery.      DISCHARGE Follow Up Recommendations for labs and diagnostics: As above      Day of Discharge     Vital Signs:  Temp:  [97.5 °F (36.4 °C)-98.8 °F (37.1 °C)] 98.3 °F (36.8 °C)  Heart Rate:  [87-97] 91  Resp:  [18-20] 20  BP: (140-156)/(65-90) 150/79  Flow (L/min):  [2] 2  Physical Exam:   Gen. well-developed appearing stated age in no acute distress  HEENT: Normocephalic atraumatic moist membranes pupils equal round reactive light, no scleral icterus no conjunctival injection  Cardiovascular: regular rate and rhythm no murmurs rubs or gallops S1-S2, no lower extremity edema appreciated  Pulmonary: Clear to auscultation bilaterally crackles bilaterally with scant expiratory wheezes, no rhonchi symmetric chest expansion, unlabored, no conversational dyspnea appreciated  Gastrointestinal: Soft nontender nondistended positive bowel sounds  all 4 quadrants no rebound or guarding  Musculoskeletal: No clubbing cyanosis, warm and well-perfused, calves soft symmetric nontender bilaterally  Skin: Clean dry without rashs  Neuro: Cranial nerves II through XII intact grossly no sensorimotor deficits appreciated bilateral upper and lower extremities, 4 out of 5 left upper extremity strength on flexion extension  Psych: Patient is calm cooperative and appropriate with exam not responding to internal stimuli  : No Verduzco catheter no bladder distention no suprapubic tenderness      Discharge Details        Discharge Medications      New Medications      Instructions Start Date   albuterol (2.5 MG/3ML) 0.083% nebulizer solution  Commonly known as: PROVENTIL   2.5 mg, Nebulization, Every 4 Hours PRN      amoxicillin-clavulanate 875-125 MG per tablet  Commonly known as: Augmentin   1 tablet, Oral, 2 Times Daily      aspirin 81 MG EC tablet   81 mg, Oral, Daily   Start Date: January 8, 2023     itraconazole 100 MG capsule  Commonly known as: Sporanox   Take 2 capsules by mouth Every 8 (Eight) Hours for 3 days, THEN 2 capsules Daily for 27 days.   Start Date: January 7, 2023     rosuvastatin 20 MG tablet  Commonly known as: Crestor   20 mg, Oral, Daily         Continue These Medications      Instructions Start Date   benzonatate 200 MG capsule  Commonly known as: TESSALON   200 mg, Oral, 3 Times Daily PRN      Insulin Lispro 100 UNIT/ML injection  Commonly known as: humaLOG   75 Units, Subcutaneous, Daily, Type of Insulin: Humalog 100 unit/ml Basal Dose:  Bolus Dose:  Prescriber Jayne Montoya Phone number [phone] Next refill 1/3/22 Refilled every 3 days only 95 units left              lisinopril 20 MG tablet  Commonly known as: PRINIVIL,ZESTRIL   20 mg, Oral, 2 Times Daily      traZODone 50 MG tablet  Commonly known as: DESYREL   50 mg, Oral, Nightly, Unsure of dose, pt spouse to call pharmacy to clarify             No Known Allergies    Discharge Disposition:  Home  or Self Care    Diet:  Hospital:  Diet Order   Procedures   • Diet: Diabetic Diets; Consistent Carbohydrate; Texture: Regular Texture (IDDSI 7); Fluid Consistency: Thin (IDDSI 0)       Discharge Activity:   Activity Instructions     Gradually Increase Activity Until at Pre-Hospitalization Level            CODE STATUS:  Code Status and Medical Interventions:   Ordered at: 01/02/23 0416     Code Status (Patient has no pulse and is not breathing):    CPR (Attempt to Resuscitate)     Medical Interventions (Patient has pulse or is breathing):    Full Support         No future appointments.    Additional Instructions for the Follow-ups that You Need to Schedule     Discharge Follow-up with PCP   As directed       Currently Documented PCP:    Mana Stafford APRN    PCP Phone Number:    [phone]     Follow Up Details: Hospital discharge follow-up acute ischemic stroke with left-sided weakness due to right internal carotid artery stenosis, influenza pneumonia, chronic cavitary pulmonary histoplasmosis         Discharge Follow-up with Specialty: Follow-up with infectious diseases as scheduled for chronic cavitary pulmonary histoplasmosis   As directed      Specialty: Follow-up with infectious diseases as scheduled for chronic cavitary pulmonary histoplasmosis         Discharge Follow-up with Specified Provider: Follow-up with vascular surgery as scheduled on 1/27/2023 for possible endarterectomy   As directed      To: Follow-up with vascular surgery as scheduled on 1/27/2023 for possible endarterectomy               Pertinent  and/or Most Recent Results     PROCEDURES:   None    LAB RESULTS:      Lab 01/05/23  0429 01/03/23  0341 01/02/23  0346 01/01/23  2105   WBC 9.14 7.77  --  10.41   HEMOGLOBIN 14.0 14.4  --  14.9   HEMATOCRIT 42.8 43.3  --  45.9   PLATELETS 257 285  --  344   NEUTROS ABS  --   --   --  6.84   IMMATURE GRANS (ABS)  --   --   --  0.03   LYMPHS ABS  --   --   --  2.17   MONOS ABS  --   --   --  0.89    EOS ABS  --   --   --  0.38   MCV 89.4 88.9  --  91.8   PROCALCITONIN  --   --   --  0.05   LACTATE  --   --  0.5  --    PROTIME  --   --   --  13.1         Lab 01/05/23 0429 01/03/23 0335 01/01/23 2105   SODIUM 139 141 143   POTASSIUM 4.3 3.7 4.5   CHLORIDE 100 102 101   CO2 32.6* 31.1* 34.5*   ANION GAP 6.4 7.9 7.5   BUN 15 20 21   CREATININE 0.74* 0.70* 0.78   EGFR 101.8 103.5 100.2   GLUCOSE 79 41* 110*   CALCIUM 9.3 9.1 9.5   MAGNESIUM  --  2.0 2.0   HEMOGLOBIN A1C  --   --  8.50*         Lab 01/01/23 2105   TOTAL PROTEIN 7.7   ALBUMIN 4.0   GLOBULIN 3.7   ALT (SGPT) 12   AST (SGOT) 17   BILIRUBIN 0.3   ALK PHOS 117         Lab 01/01/23 2105   PROBNP 429.3   PROTIME 13.1   INR 0.98         Lab 01/01/23 2105   CHOLESTEROL 201*   LDL CHOL 109*   HDL CHOL 76*   TRIGLYCERIDES 90             Brief Urine Lab Results     None        Microbiology Results (last 10 days)     Procedure Component Value - Date/Time    Blood Culture - Blood, Arm, Left [[id number]]  (Normal) Collected: 01/03/23 0335    Lab Status: Preliminary result Specimen: Blood from Arm, Left Updated: 01/07/23 0345     Blood Culture No growth at 4 days    Blood Culture - Blood, Hand, Left [[id number]]  (Normal) Collected: 01/03/23 0334    Lab Status: Preliminary result Specimen: Blood from Hand, Left Updated: 01/07/23 0345     Blood Culture No growth at 4 days    Blood Culture - Blood, Arm, Left [[id number]]  (Abnormal)  (Susceptibility) Collected: 01/02/23 0346    Lab Status: Final result Specimen: Blood from Arm, Left Updated: 01/06/23 0643     Blood Culture Staphylococcus hominis ssp hominis     Isolated from Aerobic and Anaerobic Bottles     Gram Stain Aerobic Bottle Gram positive cocci in clusters      Anaerobic Bottle Gram positive cocci in clusters    Susceptibility      Staphylococcus hominis ssp hominis      JULITO      Oxacillin Resistant     Vancomycin Susceptible                       Susceptibility Comments     Staphylococcus hominis ssp  hominis    This isolate does not demonstrate inducible clindamycin resistance in vitro.               Blood Culture - Blood, Arm, Left [[id number]]  (Abnormal) Collected: 01/02/23 0346    Lab Status: Final result Specimen: Blood from Arm, Left Updated: 01/06/23 0645     Blood Culture Staphylococcus hominis ssp hominis     Isolated from Aerobic Bottle     Gram Stain Aerobic Bottle Gram positive cocci in clusters    Narrative:      Refer to previous blood culture collected on 01/02/2023 at 0346 for MICs.    Blood Culture ID, PCR - Blood, Arm, Left [[id number]]  (Abnormal) Collected: 01/02/23 0346    Lab Status: Final result Specimen: Blood from Arm, Left Updated: 01/03/23 0339     BCID, PCR Staph spp, not aureus or lugdunensis. Identification by BCID2 PCR.    Influenza Antigen, Rapid - Swab, Nasopharynx [[id number]]  (Abnormal) Collected: 01/02/23 0152    Lab Status: Final result Specimen: Swab from Nasopharynx Updated: 01/02/23 0242     Influenza A Ag, EIA Indeterminate     Influenza B Ag, EIA Indeterminate    COVID-19,APTIMA PANTHER(LINETTE),[facility], NP/OP SWAB IN UTM/VTM/SALINE TRANSPORT MEDIA,24 HR TAT - Swab, Nasal Cavity [[id number]]  (Normal) Collected: 01/02/23 0049    Lab Status: Final result Specimen: Swab from Nasal Cavity Updated: 01/02/23 0524     COVID19 Not Detected    Narrative:      Fact sheet for providers: https://www.fda.gov/media/470516/download     Fact sheet for patients: https://www.fda.gov/media/260720/download    Test performed by RT PCR.    Influenza Antigen, Rapid - Swab, Nasopharynx [[id number]]  (Abnormal) Collected: 01/02/23 0049    Lab Status: Final result Specimen: Swab from Nasopharynx Updated: 01/02/23 0140     Influenza A Ag, EIA Indeterminate     Influenza B Ag, EIA Indeterminate          CT Angiogram Neck    Result Date: 1/2/2023  Impression:   1. There is 78 percent stenosis within the proximal RIGHT internal carotid artery by NASCET criteria.  2. There is 47 percent stenosis within  the proximal LEFT internal carotid artery by NASCET criteria.  3. No hemodynamically significant stenoses are seen elsewhere.  4. No arterial dissection is seen.  5. New or increased pulmonary nodules are seen within the partially imaged upper lung zones.  Many of these are cavitating or may represent thick-walled pulmonary cysts.  The findings are age-indeterminate.  An age-indeterminate infectious/inflammatory respiratory process is possible.  For instance, age-indeterminate granulomatous disease would be in the differential diagnosis, including, but not necessarily limited to fungal pneumonia as well as active pulmonary tuberculosis (mycobacterial pneumonia).  Other types of atypical pulmonary infectious processes cannot be excluded.  Wegener granulomatosis (or granulomatosis with polyangiitis), pulmonary sarcoidosis is, and/or septic emboli cannot be excluded.  6. There is a large bulla in the right pulmonary apex, as seen previously.  It contains air-fluid levels.  It may be infected. 7. There are emphysematous changes of the lungs.  1. 8. Please see above comments for further detail. 1.   Please note that portions of this note were completed with a voice recognition program.  RAMON VALIENTE JR, MD       Electronically Signed and Approved By: RAMON VALIENTE JR, MD on 1/02/2023 at 4:00              CT Chest Without Contrast Diagnostic    Result Date: 1/2/2023  Impression:    1. New or increased bilateral pulmonary nodules are seen, as discussed.  These findings are noncalcified.  Many of the nodules exhibit cavitation.  The findings are age-indeterminate.  An age-indeterminate infectious/inflammatory respiratory process is possible.  For instance, age-indeterminate granulomatous disease would be in the differential diagnosis, including, but not necessarily limited to fungal pneumonia as well as active pulmonary tuberculosis (mycobacterial pneumonia).  Other types of atypical pulmonary infectious processes cannot  be excluded.  Wegener granulomatosis (or granulomatosis with polyangiitis), pulmonary sarcoidosis is, and/or septic emboli cannot be excluded.  Pulmonary metastases cannot be excluded entirely but are thought to be less likely.  2. Moderate to severe emphysematous changes involve the lungs.  3. No pneumothorax or pneumomediastinum.  4. No pleural or pericardial effusion.  5. No definite filling defects are seen in the chronically dilated central airway.  6. Extensive atherosclerotic changes are noted, including involvement of the coronary arteries.  7. No cardiac enlargement.  8. Please see above comments for further detail.      Please note that portions of this note were completed with a voice recognition program.  RAMON VALIENTE JR, MD       Electronically Signed and Approved By: RAMON VALIENTE JR, MD on 1/02/2023 at 19:24              MRI Brain Without Contrast    Result Date: 1/2/2023  Impression:   MR examination of the brain without IV contrast demonstrating mild atrophy and white matter changes.  Gyriform subacute infarcts are seen in the high right frontal region, with bright signal on diffusion-weighted images and restricted diffusion.  1 cm focus of encephalomalacia in the posterior limb of the internal capsule on the right is consistent with an older infarct.  I discussed findings with Dr. fuentes at 1500 hours.      JACQUELINE DAVIS MD       Electronically Signed and Approved By: JACQUELINE DAVIS MD on 1/02/2023 at 14:58             XR Chest 1 View    Result Date: 1/2/2023  Impression:  New bilateral infiltrates are seen.  The findings may represent infectious multifocal pneumonia.       Please note that portions of this note were completed with a voice recognition program.  RAMON VALIENTE JR, MD       Electronically Signed and Approved By: RAMON VALIENTE JR, MD on 1/02/2023 at 0:46              CT Angiogram Head w AI Analysis of LVO    Result Date: 1/2/2023  Impression:  No emergent large vessel occlusion  is identified.     Please note that portions of this note were completed with a voice recognition program.  RAMON VALIENTE JR, MD       Electronically Signed and Approved By: RAMON VALIENTE JR, MD on 1/02/2023 at 3:52                Results for orders placed during the hospital encounter of 01/01/23    Duplex Carotid Ultrasound CAR    Interpretation Summary  •  There is a large dense right carotid bulb plaque with stenosis that is high-grade (over 60% through the bulb region) extending into the internal carotid artery.  •  Proximal right internal carotid artery severe stenosis.  (80 to 99%).  •  Proximal left internal carotid artery mild-moderate stenosis.  (60 to 79%).  •  Vertebral flow is antegrade bilaterally.      Results for orders placed during the hospital encounter of 01/01/23    Duplex Carotid Ultrasound CAR    Interpretation Summary  •  There is a large dense right carotid bulb plaque with stenosis that is high-grade (over 60% through the bulb region) extending into the internal carotid artery.  •  Proximal right internal carotid artery severe stenosis.  (80 to 99%).  •  Proximal left internal carotid artery mild-moderate stenosis.  (60 to 79%).  •  Vertebral flow is antegrade bilaterally.          Labs Pending at Discharge:  Pending Labs     Order Current Status    Blood Culture - Blood, Arm, Left Preliminary result    Blood Culture - Blood, Hand, Left Preliminary result            Time spent on Discharge including face to face service: Greater than 35 minutes    Electronically signed by Zachary Ayala MD, 01/07/23, 12:40 PM EST.

## 2023-01-08 LAB
BACTERIA SPEC AEROBE CULT: NORMAL
BACTERIA SPEC AEROBE CULT: NORMAL
GLUCOSE BLDC GLUCOMTR-MCNC: 134 MG/DL (ref 70–99)

## 2023-01-08 NOTE — OUTREACH NOTE
Prep Survey    Flowsheet Row Responses   [facility]? [facility]   Is LACE score < 7 ? No   Eligibility Readm Mgmt   Discharge diagnosis Left sided numbness-Carotid artery stenosis   Does the patient have one of the following disease processes/diagnoses(primary or secondary)? Other   Does the patient have Home health ordered? No   Is there a DME ordered? No   Prep survey completed? Yes          YULIET MENDOZA - Registered Nurse

## 2023-01-10 ENCOUNTER — READMISSION MANAGEMENT (OUTPATIENT)
Dept: CALL CENTER | Facility: HOSPITAL | Age: 63
End: 2023-01-10
Payer: MEDICARE

## 2023-01-10 LAB
BH CV ECHO MEAS - AO ROOT DIAM: 3.6 CM
BH CV ECHO MEAS - IVSD: 1 CM
BH CV ECHO MEAS - LA DIMENSION: 2.6 CM
BH CV ECHO MEAS - LVIDD: 3.2 CM
BH CV ECHO MEAS - LVIDS: 2.2 CM
BH CV ECHO MEAS - LVOT DIAM: 2 CM
BH CV ECHO MEAS - LVPWD: 1 CM
BH CV ECHO MEAS - MV A MAX VEL: 65 CM/SEC
BH CV ECHO MEAS - MV DEC TIME: 163 MSEC
BH CV ECHO MEAS - MV E MAX VEL: 45 CM/SEC
BH CV ECHO MEAS - MV E/A: 0.7
IVRT: 55 MSEC
MAXIMAL PREDICTED HEART RATE: 157 BPM
STRESS TARGET HR: 133 BPM

## 2023-01-10 NOTE — OUTREACH NOTE
Medical Week 1 Survey    Flowsheet Row Responses   [facility] [place] patient discharged from? [facility]   Does the patient have one of the following disease processes/diagnoses(primary or secondary)? Other   Week 1 attempt successful? No   Unsuccessful attempts Attempt 1  [attempted pt and S.O.]          MICHAEL HERNANDEZ - Registered Nurse

## 2023-01-13 ENCOUNTER — READMISSION MANAGEMENT (OUTPATIENT)
Dept: CALL CENTER | Facility: HOSPITAL | Age: 63
End: 2023-01-13
Payer: MEDICARE

## 2023-01-13 NOTE — OUTREACH NOTE
Medical Week 1 Survey    Flowsheet Row Responses   [facility] facility patient discharged from? [facility]   Does the patient have one of the following disease processes/diagnoses(primary or secondary)? Other   Week 1 attempt successful? No   Unsuccessful attempts Attempt 2          JEROMY DEL ROSARIO - Registered Nurse

## 2023-01-18 ENCOUNTER — READMISSION MANAGEMENT (OUTPATIENT)
Dept: CALL CENTER | Facility: HOSPITAL | Age: 63
End: 2023-01-18
Payer: MEDICARE

## 2023-01-18 NOTE — OUTREACH NOTE
Medical Week 1 Survey    Flowsheet Row Responses   [facility] patient discharged from? [facility]   Does the patient have one of the following disease processes/diagnoses(primary or secondary)? Other   Week 1 attempt successful? Yes   Call start time 0850   Call end time 0853   Discharge diagnosis Left sided numbness-Carotid artery stenosis   Meds reviewed with patient/caregiver? Yes   Is the patient having any side effects they believe may be caused by any medication additions or changes? No   Does the patient have all medications ordered at discharge? Yes   Is the patient taking all medications as directed (includes completed medication regime)? Yes   Does the patient have a primary care provider?  Yes   Does the patient have an appointment with their PCP within 7 days of discharge? Yes   Has the patient kept scheduled appointments due by today? Yes   Has home health visited the patient within 72 hours of discharge? N/A   Psychosocial issues? No   Did the patient receive a copy of their discharge instructions? Yes   Nursing interventions Reviewed instructions with patient   What is the patient's perception of their health status since discharge? Improving   Is the patient/caregiver able to teach back signs and symptoms related to disease process for when to call PCP? Yes   Is the patient/caregiver able to teach back signs and symptoms related to disease process for when to call 911? Yes   Is the patient/caregiver able to teach back the hierarchy of who to call/visit for symptoms/problems? PCP, Specialist, Home health nurse, Urgent Care, ED, 911 Yes   Week 1 call completed? Yes   Wrap up additional comments Pt reports he is doing ok at this time. Pt has completed FU with PCP. Pt is aware of vasuclar surgery date. Pt is also aware of S/S of stroke.           DOMENICO MCCLOUD - Registered Nurse

## 2023-01-26 ENCOUNTER — READMISSION MANAGEMENT (OUTPATIENT)
Dept: CALL CENTER | Facility: HOSPITAL | Age: 63
End: 2023-01-26
Payer: MEDICARE

## 2023-01-26 RX ORDER — IBUPROFEN 200 MG
400 TABLET ORAL EVERY 6 HOURS PRN
COMMUNITY

## 2023-01-26 NOTE — OUTREACH NOTE
Medical Week 2 Survey    Flowsheet Row Responses   [facility] facility [facility] discharged from? [facility]   Does the patient have one of the following disease processes/diagnoses(primary or secondary)? Other   Week 2 attempt successful? No   Unsuccessful attempts Attempt 1          DUSTY CRUZ - Registered Nurse

## 2023-01-27 ENCOUNTER — ANESTHESIA EVENT (OUTPATIENT)
Dept: PERIOP | Facility: HOSPITAL | Age: 63
DRG: 37 | End: 2023-01-27
Payer: MEDICARE

## 2023-01-27 ENCOUNTER — HOSPITAL ENCOUNTER (INPATIENT)
Facility: HOSPITAL | Age: 63
LOS: 4 days | Discharge: HOME | DRG: 37 | End: 2023-02-01
Attending: SURGERY | Admitting: SURGERY
Payer: MEDICARE

## 2023-01-27 ENCOUNTER — READMISSION MANAGEMENT (OUTPATIENT)
Dept: CALL CENTER | Facility: HOSPITAL | Age: 63
End: 2023-01-27
Payer: MEDICARE

## 2023-01-27 ENCOUNTER — APPOINTMENT (OUTPATIENT)
Dept: CT IMAGING | Facility: HOSPITAL | Age: 63
DRG: 37 | End: 2023-01-27
Payer: MEDICARE

## 2023-01-27 ENCOUNTER — APPOINTMENT (OUTPATIENT)
Dept: GENERAL RADIOLOGY | Facility: HOSPITAL | Age: 63
DRG: 37 | End: 2023-01-27
Payer: MEDICARE

## 2023-01-27 ENCOUNTER — ANESTHESIA (OUTPATIENT)
Dept: PERIOP | Facility: HOSPITAL | Age: 63
DRG: 37 | End: 2023-01-27
Payer: MEDICARE

## 2023-01-27 DIAGNOSIS — I65.21 STENOSIS OF RIGHT CAROTID ARTERY: ICD-10-CM

## 2023-01-27 DIAGNOSIS — R13.12 OROPHARYNGEAL DYSPHAGIA: ICD-10-CM

## 2023-01-27 DIAGNOSIS — I65.29 STENOSIS OF CAROTID ARTERY, UNSPECIFIED LATERALITY: ICD-10-CM

## 2023-01-27 DIAGNOSIS — Z78.9 DECREASED ACTIVITIES OF DAILY LIVING (ADL): ICD-10-CM

## 2023-01-27 DIAGNOSIS — I63.9 ACUTE CVA (CEREBROVASCULAR ACCIDENT): ICD-10-CM

## 2023-01-27 DIAGNOSIS — R26.2 DIFFICULTY IN WALKING: Primary | ICD-10-CM

## 2023-01-27 LAB
ABO GROUP BLD: NORMAL
ABO GROUP BLD: NORMAL
ACT BLD: 149 SECONDS (ref 89–137)
ACT BLD: 203 SECONDS (ref 89–137)
ACT BLD: 227 SECONDS (ref 89–137)
ACT BLD: 239 SECONDS (ref 89–137)
ALBUMIN SERPL-MCNC: 4 G/DL (ref 3.5–5.2)
ALBUMIN SERPL-MCNC: 4.4 G/DL (ref 3.5–5.2)
ALBUMIN/GLOB SERPL: 1.2 G/DL
ALBUMIN/GLOB SERPL: 1.4 G/DL
ALP SERPL-CCNC: 109 U/L (ref 39–117)
ALP SERPL-CCNC: 132 U/L (ref 39–117)
ALT SERPL W P-5'-P-CCNC: 15 U/L (ref 1–41)
ALT SERPL W P-5'-P-CCNC: 17 U/L (ref 1–41)
ANION GAP SERPL CALCULATED.3IONS-SCNC: 14.7 MMOL/L (ref 5–15)
ANION GAP SERPL CALCULATED.3IONS-SCNC: 9.6 MMOL/L (ref 5–15)
APTT PPP: 28.9 SECONDS (ref 24.2–34.2)
ARTERIAL PATENCY WRIST A: ABNORMAL
AST SERPL-CCNC: 16 U/L (ref 1–40)
AST SERPL-CCNC: 19 U/L (ref 1–40)
BASE EXCESS BLDA CALC-SCNC: -2.7 MMOL/L (ref -2–2)
BASOPHILS # BLD AUTO: 0.1 10*3/MM3 (ref 0–0.2)
BASOPHILS NFR BLD AUTO: 1.1 % (ref 0–1.5)
BDY SITE: ABNORMAL
BILIRUB SERPL-MCNC: 0.4 MG/DL (ref 0–1.2)
BILIRUB SERPL-MCNC: 0.6 MG/DL (ref 0–1.2)
BLD GP AB SCN SERPL QL: NEGATIVE
BUN SERPL-MCNC: 20 MG/DL (ref 8–23)
BUN SERPL-MCNC: 24 MG/DL (ref 8–23)
BUN/CREAT SERPL: 26.3 (ref 7–25)
BUN/CREAT SERPL: 28.6 (ref 7–25)
CA-I BLDA-SCNC: 1.15 MMOL/L (ref 1.13–1.32)
CALCIUM 24H UR-MCNC: 2.5 MG/DL
CALCIUM SPEC-SCNC: 9.2 MG/DL (ref 8.6–10.5)
CALCIUM SPEC-SCNC: 9.5 MG/DL (ref 8.6–10.5)
CHLORIDE BLDA-SCNC: 100 MMOL/L (ref 98–106)
CHLORIDE SERPL-SCNC: 103 MMOL/L (ref 98–107)
CHLORIDE SERPL-SCNC: 98 MMOL/L (ref 98–107)
CHLORIDE UR-SCNC: 86 MMOL/L
CO2 SERPL-SCNC: 24.3 MMOL/L (ref 22–29)
CO2 SERPL-SCNC: 31.4 MMOL/L (ref 22–29)
COHGB MFR BLD: 0.3 % (ref 0–1.5)
CREAT SERPL-MCNC: 0.76 MG/DL (ref 0.76–1.27)
CREAT SERPL-MCNC: 0.84 MG/DL (ref 0.76–1.27)
CREAT UR-MCNC: 14.5 MG/DL
CREAT UR-MCNC: 16.1 MG/DL
DEPRECATED RDW RBC AUTO: 46.4 FL (ref 37–54)
EGFRCR SERPLBLD CKD-EPI 2021: 101 ML/MIN/1.73
EGFRCR SERPLBLD CKD-EPI 2021: 98 ML/MIN/1.73
EOSINOPHIL # BLD AUTO: 0.42 10*3/MM3 (ref 0–0.4)
EOSINOPHIL NFR BLD AUTO: 4.6 % (ref 0.3–6.2)
ERYTHROCYTE [DISTWIDTH] IN BLOOD BY AUTOMATED COUNT: 14 % (ref 12.3–15.4)
FHHB: 1.9 % (ref 0–5)
GAS FLOW AIRWAY: ABNORMAL L/MIN
GLOBULIN UR ELPH-MCNC: 2.8 GM/DL
GLOBULIN UR ELPH-MCNC: 3.6 GM/DL
GLUCOSE BLDA-MCNC: 326 MG/DL (ref 70–99)
GLUCOSE BLDC GLUCOMTR-MCNC: 157 MG/DL (ref 70–99)
GLUCOSE BLDC GLUCOMTR-MCNC: 265 MG/DL (ref 70–99)
GLUCOSE BLDC GLUCOMTR-MCNC: 316 MG/DL (ref 70–99)
GLUCOSE BLDC GLUCOMTR-MCNC: 328 MG/DL (ref 70–99)
GLUCOSE SERPL-MCNC: 343 MG/DL (ref 65–99)
GLUCOSE SERPL-MCNC: 72 MG/DL (ref 65–99)
HCO3 BLDA-SCNC: 23.9 MMOL/L (ref 22–26)
HCT VFR BLD AUTO: 43.1 % (ref 37.5–51)
HGB BLD-MCNC: 14.3 G/DL (ref 13–17.7)
HGB BLDA-MCNC: 12.1 G/DL (ref 13.8–16.4)
IMM GRANULOCYTES # BLD AUTO: 0.02 10*3/MM3 (ref 0–0.05)
IMM GRANULOCYTES NFR BLD AUTO: 0.2 % (ref 0–0.5)
INHALED O2 CONCENTRATION: 50 %
INR PPP: 1.03 (ref 0.86–1.15)
INR PPP: 1.09 (ref 0.86–1.15)
LACTATE BLDA-SCNC: 1.81 MMOL/L (ref 0.5–2)
LYMPHOCYTES # BLD AUTO: 3.03 10*3/MM3 (ref 0.7–3.1)
LYMPHOCYTES NFR BLD AUTO: 33.2 % (ref 19.6–45.3)
MAGNESIUM SERPL-MCNC: 1.3 MG/DL (ref 1.6–2.4)
MCH RBC QN AUTO: 29.8 PG (ref 26.6–33)
MCHC RBC AUTO-ENTMCNC: 33.2 G/DL (ref 31.5–35.7)
MCV RBC AUTO: 89.8 FL (ref 79–97)
METHGB BLD QL: 0 % (ref 0–1.5)
MODALITY: ABNORMAL
MONOCYTES # BLD AUTO: 0.99 10*3/MM3 (ref 0.1–0.9)
MONOCYTES NFR BLD AUTO: 10.8 % (ref 5–12)
NEUTROPHILS NFR BLD AUTO: 4.58 10*3/MM3 (ref 1.7–7)
NEUTROPHILS NFR BLD AUTO: 50.1 % (ref 42.7–76)
NOTE: ABNORMAL
NRBC BLD AUTO-RTO: 0 /100 WBC (ref 0–0.2)
OSMOLALITY SERPL: 306 MOSM/KG (ref 280–301)
OSMOLALITY UR: 438 MOSM/KG (ref 50–1400)
OXYHGB MFR BLDV: 97.8 % (ref 94–99)
PCO2 BLDA: 48.9 MM HG (ref 35–45)
PH BLDA: 7.31 PH UNITS (ref 7.35–7.45)
PHOSPHATE SERPL-MCNC: 3.1 MG/DL (ref 2.5–4.5)
PLATELET # BLD AUTO: 313 10*3/MM3 (ref 140–450)
PMV BLD AUTO: 9.3 FL (ref 6–12)
PO2 BLD: 364 MM[HG] (ref 0–500)
PO2 BLDA: 181.8 MM HG (ref 80–100)
POTASSIUM BLDA-SCNC: 4.38 MMOL/L (ref 3.5–5)
POTASSIUM SERPL-SCNC: 3.9 MMOL/L (ref 3.5–5.2)
POTASSIUM SERPL-SCNC: 4.6 MMOL/L (ref 3.5–5.2)
POTASSIUM UR-SCNC: 35.8 MMOL/L
PROT ?TM UR-MCNC: 5 MG/DL
PROT SERPL-MCNC: 6.8 G/DL (ref 6–8.5)
PROT SERPL-MCNC: 8 G/DL (ref 6–8.5)
PROT/CREAT UR: 0.31 MG/G{CREAT}
PROTHROMBIN TIME: 13.6 SECONDS (ref 11.8–14.9)
PROTHROMBIN TIME: 14.2 SECONDS (ref 11.8–14.9)
RBC # BLD AUTO: 4.8 10*6/MM3 (ref 4.14–5.8)
RH BLD: POSITIVE
RH BLD: POSITIVE
SAO2 % BLDCOA: 98.1 % (ref 95–99)
SODIUM BLDA-SCNC: 135.4 MMOL/L (ref 136–146)
SODIUM SERPL-SCNC: 137 MMOL/L (ref 136–145)
SODIUM SERPL-SCNC: 144 MMOL/L (ref 136–145)
SODIUM UR-SCNC: 77 MMOL/L
T&S EXPIRATION DATE: NORMAL
UUN 24H UR-MCNC: 180 MG/DL
WBC NRBC COR # BLD: 9.14 10*3/MM3 (ref 3.4–10.8)

## 2023-01-27 PROCEDURE — A9270 NON-COVERED ITEM OR SERVICE: HCPCS | Performed by: ANESTHESIOLOGY

## 2023-01-27 PROCEDURE — A9270 NON-COVERED ITEM OR SERVICE: HCPCS | Performed by: SURGERY

## 2023-01-27 PROCEDURE — 83050 HGB METHEMOGLOBIN QUAN: CPT | Performed by: SURGERY

## 2023-01-27 PROCEDURE — 84133 ASSAY OF URINE POTASSIUM: CPT | Performed by: PHYSICIAN ASSISTANT

## 2023-01-27 PROCEDURE — 70498 CT ANGIOGRAPHY NECK: CPT

## 2023-01-27 PROCEDURE — 94761 N-INVAS EAR/PLS OXIMETRY MLT: CPT

## 2023-01-27 PROCEDURE — 80053 COMPREHEN METABOLIC PANEL: CPT | Performed by: PHYSICIAN ASSISTANT

## 2023-01-27 PROCEDURE — 84540 ASSAY OF URINE/UREA-N: CPT | Performed by: PHYSICIAN ASSISTANT

## 2023-01-27 PROCEDURE — 63710000001 CHLORHEXIDINE 0.12 % SOLUTION: Performed by: PHYSICIAN ASSISTANT

## 2023-01-27 PROCEDURE — 84156 ASSAY OF PROTEIN URINE: CPT | Performed by: PHYSICIAN ASSISTANT

## 2023-01-27 PROCEDURE — 63710000001 LISINOPRIL 20 MG TABLET: Performed by: SURGERY

## 2023-01-27 PROCEDURE — 03CK0ZZ EXTIRPATION OF MATTER FROM RIGHT INTERNAL CAROTID ARTERY, OPEN APPROACH: ICD-10-PCS | Performed by: SURGERY

## 2023-01-27 PROCEDURE — 0042T HC CT CEREBRAL PERFUSION W/WO CONTRAST: CPT

## 2023-01-27 PROCEDURE — 94799 UNLISTED PULMONARY SVC/PX: CPT

## 2023-01-27 PROCEDURE — 70496 CT ANGIOGRAPHY HEAD: CPT

## 2023-01-27 PROCEDURE — 25010000002 MIDAZOLAM PER 1 MG: Performed by: ANESTHESIOLOGY

## 2023-01-27 PROCEDURE — 70450 CT HEAD/BRAIN W/O DYE: CPT

## 2023-01-27 PROCEDURE — 03UK0KZ SUPPLEMENT RIGHT INTERNAL CAROTID ARTERY WITH NONAUTOLOGOUS TISSUE SUBSTITUTE, OPEN APPROACH: ICD-10-PCS | Performed by: SURGERY

## 2023-01-27 PROCEDURE — 83930 ASSAY OF BLOOD OSMOLALITY: CPT | Performed by: PHYSICIAN ASSISTANT

## 2023-01-27 PROCEDURE — 83735 ASSAY OF MAGNESIUM: CPT | Performed by: PHYSICIAN ASSISTANT

## 2023-01-27 PROCEDURE — P9041 ALBUMIN (HUMAN),5%, 50ML: HCPCS | Performed by: NURSE ANESTHETIST, CERTIFIED REGISTERED

## 2023-01-27 PROCEDURE — 25010000002 MIDAZOLAM PER 1 MG: Performed by: NURSE ANESTHETIST, CERTIFIED REGISTERED

## 2023-01-27 PROCEDURE — 88304 TISSUE EXAM BY PATHOLOGIST: CPT | Performed by: SURGERY

## 2023-01-27 PROCEDURE — 25010000002 HEPARIN (PORCINE) PER 1000 UNITS: Performed by: SURGERY

## 2023-01-27 PROCEDURE — 25010000002 PROPOFOL 10 MG/ML EMULSION: Performed by: NURSE ANESTHETIST, CERTIFIED REGISTERED

## 2023-01-27 PROCEDURE — 86902 BLOOD TYPE ANTIGEN DONOR EA: CPT

## 2023-01-27 PROCEDURE — 80053 COMPREHEN METABOLIC PANEL: CPT | Performed by: SURGERY

## 2023-01-27 PROCEDURE — 86850 RBC ANTIBODY SCREEN: CPT | Performed by: SURGERY

## 2023-01-27 PROCEDURE — 63710000001 INSULIN REGULAR HUMAN PER 5 UNITS: Performed by: ANESTHESIOLOGY

## 2023-01-27 PROCEDURE — 25010000002 CEFAZOLIN IN DEXTROSE 2-4 GM/100ML-% SOLUTION: Performed by: SURGERY

## 2023-01-27 PROCEDURE — 25010000002 HYDRALAZINE PER 20 MG: Performed by: NURSE ANESTHETIST, CERTIFIED REGISTERED

## 2023-01-27 PROCEDURE — 25010000002 PROMETHAZINE PER 50 MG: Performed by: SURGERY

## 2023-01-27 PROCEDURE — 82375 ASSAY CARBOXYHB QUANT: CPT | Performed by: SURGERY

## 2023-01-27 PROCEDURE — 86901 BLOOD TYPING SEROLOGIC RH(D): CPT | Performed by: SURGERY

## 2023-01-27 PROCEDURE — 86901 BLOOD TYPING SEROLOGIC RH(D): CPT

## 2023-01-27 PROCEDURE — 0 IOPAMIDOL PER 1 ML: Performed by: SURGERY

## 2023-01-27 PROCEDURE — 88311 DECALCIFY TISSUE: CPT | Performed by: SURGERY

## 2023-01-27 PROCEDURE — 63710000001 ASPIRIN 81 MG TABLET DELAYED-RELEASE: Performed by: SURGERY

## 2023-01-27 PROCEDURE — 85730 THROMBOPLASTIN TIME PARTIAL: CPT | Performed by: SURGERY

## 2023-01-27 PROCEDURE — 80189 DRUG ASSAY ITRACONAZOLE: CPT | Performed by: SURGERY

## 2023-01-27 PROCEDURE — 85025 COMPLETE CBC W/AUTO DIFF WBC: CPT | Performed by: SURGERY

## 2023-01-27 PROCEDURE — 25010000002 PHENYLEPHRINE 10 MG/ML SOLUTION 1 ML VIAL: Performed by: NURSE ANESTHETIST, CERTIFIED REGISTERED

## 2023-01-27 PROCEDURE — 25010000002 ALBUMIN HUMAN 5% PER 50 ML: Performed by: NURSE ANESTHETIST, CERTIFIED REGISTERED

## 2023-01-27 PROCEDURE — 35301 RECHANNELING OF ARTERY: CPT | Performed by: SURGERY

## 2023-01-27 PROCEDURE — 84100 ASSAY OF PHOSPHORUS: CPT | Performed by: PHYSICIAN ASSISTANT

## 2023-01-27 PROCEDURE — 94002 VENT MGMT INPAT INIT DAY: CPT

## 2023-01-27 PROCEDURE — 84300 ASSAY OF URINE SODIUM: CPT | Performed by: PHYSICIAN ASSISTANT

## 2023-01-27 PROCEDURE — 25010000002 CEFAZOLIN 1-4 GM/50ML-% SOLUTION: Performed by: SURGERY

## 2023-01-27 PROCEDURE — 25010000002 FENTANYL CITRATE (PF) 50 MCG/ML SOLUTION: Performed by: NURSE ANESTHETIST, CERTIFIED REGISTERED

## 2023-01-27 PROCEDURE — 82436 ASSAY OF URINE CHLORIDE: CPT | Performed by: PHYSICIAN ASSISTANT

## 2023-01-27 PROCEDURE — 82310 ASSAY OF CALCIUM: CPT | Performed by: PHYSICIAN ASSISTANT

## 2023-01-27 PROCEDURE — 85610 PROTHROMBIN TIME: CPT | Performed by: SURGERY

## 2023-01-27 PROCEDURE — 74018 RADEX ABDOMEN 1 VIEW: CPT

## 2023-01-27 PROCEDURE — 71045 X-RAY EXAM CHEST 1 VIEW: CPT

## 2023-01-27 PROCEDURE — C1768 GRAFT, VASCULAR: HCPCS | Performed by: SURGERY

## 2023-01-27 PROCEDURE — 82570 ASSAY OF URINE CREATININE: CPT | Performed by: PHYSICIAN ASSISTANT

## 2023-01-27 PROCEDURE — 63710000001: Performed by: SURGERY

## 2023-01-27 PROCEDURE — A9270 NON-COVERED ITEM OR SERVICE: HCPCS | Performed by: PHYSICIAN ASSISTANT

## 2023-01-27 PROCEDURE — 86900 BLOOD TYPING SEROLOGIC ABO: CPT | Performed by: SURGERY

## 2023-01-27 PROCEDURE — 86900 BLOOD TYPING SEROLOGIC ABO: CPT

## 2023-01-27 PROCEDURE — 25010000002 PROCHLORPERAZINE 10 MG/2ML SOLUTION: Performed by: NURSE ANESTHETIST, CERTIFIED REGISTERED

## 2023-01-27 PROCEDURE — 63710000001 ACETAMINOPHEN 500 MG TABLET: Performed by: ANESTHESIOLOGY

## 2023-01-27 PROCEDURE — 25010000002 HYDROMORPHONE 1 MG/ML SOLUTION: Performed by: SURGERY

## 2023-01-27 PROCEDURE — 4A10X4Z MONITORING OF CENTRAL NERVOUS ELECTRICAL ACTIVITY, EXTERNAL APPROACH: ICD-10-PCS | Performed by: SURGERY

## 2023-01-27 PROCEDURE — 82805 BLOOD GASES W/O2 SATURATION: CPT | Performed by: SURGERY

## 2023-01-27 PROCEDURE — 83935 ASSAY OF URINE OSMOLALITY: CPT | Performed by: PHYSICIAN ASSISTANT

## 2023-01-27 PROCEDURE — 82962 GLUCOSE BLOOD TEST: CPT

## 2023-01-27 PROCEDURE — 25010000002 NALOXONE PER 1 MG: Performed by: NURSE ANESTHETIST, CERTIFIED REGISTERED

## 2023-01-27 PROCEDURE — 25010000002 HEPARIN (PORCINE) PER 1000 UNITS: Performed by: NURSE ANESTHETIST, CERTIFIED REGISTERED

## 2023-01-27 PROCEDURE — 25010000002 METOCLOPRAMIDE PER 10 MG: Performed by: ANESTHESIOLOGY

## 2023-01-27 PROCEDURE — 63710000001 INSULIN REGULAR HUMAN PER 5 UNITS: Performed by: PHYSICIAN ASSISTANT

## 2023-01-27 PROCEDURE — 63710000001 ROSUVASTATIN 20 MG TABLET: Performed by: SURGERY

## 2023-01-27 PROCEDURE — 25010000002 MAGNESIUM SULFATE 2 GM/50ML SOLUTION: Performed by: PHYSICIAN ASSISTANT

## 2023-01-27 PROCEDURE — 85347 COAGULATION TIME ACTIVATED: CPT

## 2023-01-27 DEVICE — PTCH VASC XENOSURE BIO 0.8X8CM: Type: IMPLANTABLE DEVICE | Site: CAROTID | Status: FUNCTIONAL

## 2023-01-27 DEVICE — LIGACLIP MCA MULTIPLE CLIP APPLIERS, 20 MEDIUM CLIPS
Type: IMPLANTABLE DEVICE | Site: CAROTID | Status: FUNCTIONAL
Brand: LIGACLIP

## 2023-01-27 DEVICE — LIGACLIP MCA MULTIPLE CLIP APPLIERS, 20 SMALL CLIPS
Type: IMPLANTABLE DEVICE | Site: CAROTID | Status: FUNCTIONAL
Brand: LIGACLIP

## 2023-01-27 RX ORDER — CLOPIDOGREL BISULFATE 75 MG/1
75 TABLET ORAL DAILY
Status: DISCONTINUED | OUTPATIENT
Start: 2023-01-28 | End: 2023-01-28

## 2023-01-27 RX ORDER — SUCCINYLCHOLINE/SOD CL,ISO/PF 100 MG/5ML
SYRINGE (ML) INTRAVENOUS AS NEEDED
Status: DISCONTINUED | OUTPATIENT
Start: 2023-01-27 | End: 2023-01-27 | Stop reason: SURG

## 2023-01-27 RX ORDER — SODIUM CHLORIDE 0.9 % (FLUSH) 0.9 %
10 SYRINGE (ML) INJECTION AS NEEDED
Status: DISCONTINUED | OUTPATIENT
Start: 2023-01-27 | End: 2023-02-01 | Stop reason: HOSPADM

## 2023-01-27 RX ORDER — PHENYLEPHRINE HCL IN 0.9% NACL 0.5 MG/5ML
.5-3 SYRINGE (ML) INTRAVENOUS
Status: DISCONTINUED | OUTPATIENT
Start: 2023-01-27 | End: 2023-01-28

## 2023-01-27 RX ORDER — PROCHLORPERAZINE MALEATE 5 MG/1
5 TABLET ORAL EVERY 6 HOURS PRN
Status: DISCONTINUED | OUTPATIENT
Start: 2023-01-27 | End: 2023-01-28

## 2023-01-27 RX ORDER — SODIUM CHLORIDE 0.9 % (FLUSH) 0.9 %
10 SYRINGE (ML) INJECTION EVERY 12 HOURS SCHEDULED
Status: DISCONTINUED | OUTPATIENT
Start: 2023-01-27 | End: 2023-02-01 | Stop reason: HOSPADM

## 2023-01-27 RX ORDER — NICOTINE POLACRILEX 4 MG
15 LOZENGE BUCCAL
Status: DISCONTINUED | OUTPATIENT
Start: 2023-01-27 | End: 2023-01-27

## 2023-01-27 RX ORDER — HEPARIN SOD,PORCINE/0.9 % NACL 25000/250
500 INTRAVENOUS SOLUTION INTRAVENOUS CONTINUOUS
Status: DISCONTINUED | OUTPATIENT
Start: 2023-01-27 | End: 2023-01-28

## 2023-01-27 RX ORDER — PROPOFOL 10 MG/ML
VIAL (ML) INTRAVENOUS AS NEEDED
Status: DISCONTINUED | OUTPATIENT
Start: 2023-01-27 | End: 2023-01-27 | Stop reason: SURG

## 2023-01-27 RX ORDER — ONDANSETRON 2 MG/ML
4 INJECTION INTRAMUSCULAR; INTRAVENOUS EVERY 6 HOURS PRN
Status: DISCONTINUED | OUTPATIENT
Start: 2023-01-27 | End: 2023-01-28

## 2023-01-27 RX ORDER — METOCLOPRAMIDE HYDROCHLORIDE 5 MG/ML
10 INJECTION INTRAMUSCULAR; INTRAVENOUS ONCE AS NEEDED
Status: COMPLETED | OUTPATIENT
Start: 2023-01-27 | End: 2023-01-27

## 2023-01-27 RX ORDER — MIDAZOLAM HYDROCHLORIDE 1 MG/ML
INJECTION INTRAMUSCULAR; INTRAVENOUS AS NEEDED
Status: DISCONTINUED | OUTPATIENT
Start: 2023-01-27 | End: 2023-01-27 | Stop reason: SURG

## 2023-01-27 RX ORDER — PHENYLEPHRINE HCL IN 0.9% NACL 1 MG/10 ML
SYRINGE (ML) INTRAVENOUS AS NEEDED
Status: DISCONTINUED | OUTPATIENT
Start: 2023-01-27 | End: 2023-01-27 | Stop reason: SURG

## 2023-01-27 RX ORDER — CEFAZOLIN SODIUM 2 G/100ML
2 INJECTION, SOLUTION INTRAVENOUS ONCE
Status: COMPLETED | OUTPATIENT
Start: 2023-01-27 | End: 2023-01-27

## 2023-01-27 RX ORDER — PROCHLORPERAZINE EDISYLATE 5 MG/ML
5 INJECTION INTRAMUSCULAR; INTRAVENOUS EVERY 6 HOURS PRN
Status: DISCONTINUED | OUTPATIENT
Start: 2023-01-27 | End: 2023-01-28

## 2023-01-27 RX ORDER — SODIUM CHLORIDE 9 MG/ML
40 INJECTION, SOLUTION INTRAVENOUS AS NEEDED
Status: DISCONTINUED | OUTPATIENT
Start: 2023-01-27 | End: 2023-02-01 | Stop reason: HOSPADM

## 2023-01-27 RX ORDER — EPHEDRINE SULFATE 50 MG/ML
INJECTION, SOLUTION INTRAVENOUS AS NEEDED
Status: DISCONTINUED | OUTPATIENT
Start: 2023-01-27 | End: 2023-01-27 | Stop reason: SURG

## 2023-01-27 RX ORDER — DEXMEDETOMIDINE HYDROCHLORIDE 4 UG/ML
.2-1.5 INJECTION, SOLUTION INTRAVENOUS
Status: DISCONTINUED | OUTPATIENT
Start: 2023-01-27 | End: 2023-01-28

## 2023-01-27 RX ORDER — FENTANYL CITRATE 50 UG/ML
INJECTION, SOLUTION INTRAMUSCULAR; INTRAVENOUS AS NEEDED
Status: DISCONTINUED | OUTPATIENT
Start: 2023-01-27 | End: 2023-01-27 | Stop reason: SURG

## 2023-01-27 RX ORDER — PROMETHAZINE HYDROCHLORIDE 25 MG/1
25 SUPPOSITORY RECTAL EVERY 6 HOURS PRN
Status: DISCONTINUED | OUTPATIENT
Start: 2023-01-27 | End: 2023-02-01 | Stop reason: HOSPADM

## 2023-01-27 RX ORDER — CALCIUM CHLORIDE 100 MG/ML
INJECTION INTRAVENOUS; INTRAVENTRICULAR AS NEEDED
Status: DISCONTINUED | OUTPATIENT
Start: 2023-01-27 | End: 2023-01-27 | Stop reason: SURG

## 2023-01-27 RX ORDER — LISINOPRIL 20 MG/1
20 TABLET ORAL 2 TIMES DAILY
Status: DISCONTINUED | OUTPATIENT
Start: 2023-01-27 | End: 2023-01-28

## 2023-01-27 RX ORDER — ONDANSETRON 4 MG/1
4 TABLET, FILM COATED ORAL EVERY 6 HOURS PRN
Status: DISCONTINUED | OUTPATIENT
Start: 2023-01-27 | End: 2023-01-28

## 2023-01-27 RX ORDER — PROMETHAZINE HYDROCHLORIDE 12.5 MG/1
25 TABLET ORAL ONCE AS NEEDED
Status: DISCONTINUED | OUTPATIENT
Start: 2023-01-27 | End: 2023-01-27 | Stop reason: HOSPADM

## 2023-01-27 RX ORDER — BENZONATATE 100 MG/1
200 CAPSULE ORAL 3 TIMES DAILY PRN
Status: DISCONTINUED | OUTPATIENT
Start: 2023-01-27 | End: 2023-01-28

## 2023-01-27 RX ORDER — ITRACONAZOLE 100 MG/1
200 CAPSULE ORAL 2 TIMES DAILY WITH MEALS
Status: DISCONTINUED | OUTPATIENT
Start: 2023-01-27 | End: 2023-01-28

## 2023-01-27 RX ORDER — HEPARIN SODIUM 1000 [USP'U]/ML
INJECTION, SOLUTION INTRAVENOUS; SUBCUTANEOUS AS NEEDED
Status: DISCONTINUED | OUTPATIENT
Start: 2023-01-27 | End: 2023-01-27 | Stop reason: SURG

## 2023-01-27 RX ORDER — NALOXONE HYDROCHLORIDE 0.4 MG/ML
INJECTION, SOLUTION INTRAMUSCULAR; INTRAVENOUS; SUBCUTANEOUS AS NEEDED
Status: DISCONTINUED | OUTPATIENT
Start: 2023-01-27 | End: 2023-01-27 | Stop reason: SURG

## 2023-01-27 RX ORDER — ONDANSETRON 2 MG/ML
4 INJECTION INTRAMUSCULAR; INTRAVENOUS ONCE AS NEEDED
Status: DISCONTINUED | OUTPATIENT
Start: 2023-01-27 | End: 2023-01-27 | Stop reason: HOSPADM

## 2023-01-27 RX ORDER — METOPROLOL TARTRATE 5 MG/5ML
INJECTION INTRAVENOUS AS NEEDED
Status: DISCONTINUED | OUTPATIENT
Start: 2023-01-27 | End: 2023-01-27 | Stop reason: SURG

## 2023-01-27 RX ORDER — ASPIRIN 81 MG/1
81 TABLET ORAL DAILY
Status: DISCONTINUED | OUTPATIENT
Start: 2023-01-27 | End: 2023-01-28

## 2023-01-27 RX ORDER — ONDANSETRON 2 MG/ML
INJECTION INTRAMUSCULAR; INTRAVENOUS
Status: DISPENSED
Start: 2023-01-27 | End: 2023-01-28

## 2023-01-27 RX ORDER — ESMOLOL HYDROCHLORIDE 10 MG/ML
INJECTION INTRAVENOUS AS NEEDED
Status: DISCONTINUED | OUTPATIENT
Start: 2023-01-27 | End: 2023-01-27 | Stop reason: SURG

## 2023-01-27 RX ORDER — MAGNESIUM SULFATE HEPTAHYDRATE 40 MG/ML
4 INJECTION, SOLUTION INTRAVENOUS ONCE
Status: COMPLETED | OUTPATIENT
Start: 2023-01-27 | End: 2023-01-27

## 2023-01-27 RX ORDER — ROCURONIUM BROMIDE 10 MG/ML
INJECTION, SOLUTION INTRAVENOUS AS NEEDED
Status: DISCONTINUED | OUTPATIENT
Start: 2023-01-27 | End: 2023-01-27 | Stop reason: SURG

## 2023-01-27 RX ORDER — CEFAZOLIN SODIUM 1 G/50ML
INJECTION, SOLUTION INTRAVENOUS CONTINUOUS PRN
Status: COMPLETED | OUTPATIENT
Start: 2023-01-27 | End: 2023-01-27

## 2023-01-27 RX ORDER — NICOTINE POLACRILEX 4 MG
15 LOZENGE BUCCAL
Status: DISCONTINUED | OUTPATIENT
Start: 2023-01-27 | End: 2023-01-28

## 2023-01-27 RX ORDER — MAGNESIUM HYDROXIDE 1200 MG/15ML
LIQUID ORAL AS NEEDED
Status: DISCONTINUED | OUTPATIENT
Start: 2023-01-27 | End: 2023-01-27 | Stop reason: HOSPADM

## 2023-01-27 RX ORDER — CEFAZOLIN SODIUM 2 G/100ML
2 INJECTION, SOLUTION INTRAVENOUS EVERY 8 HOURS
Status: COMPLETED | OUTPATIENT
Start: 2023-01-27 | End: 2023-01-29

## 2023-01-27 RX ORDER — NOREPINEPHRINE BIT/0.9 % NACL 8 MG/250ML
INFUSION BOTTLE (ML) INTRAVENOUS
Status: DISPENSED
Start: 2023-01-27 | End: 2023-01-28

## 2023-01-27 RX ORDER — HEPARIN SODIUM 5000 [USP'U]/ML
5000 INJECTION, SOLUTION INTRAVENOUS; SUBCUTANEOUS EVERY 8 HOURS SCHEDULED
Status: DISCONTINUED | OUTPATIENT
Start: 2023-01-28 | End: 2023-01-27 | Stop reason: SDUPTHER

## 2023-01-27 RX ORDER — MIDAZOLAM HYDROCHLORIDE 1 MG/ML
2 INJECTION INTRAMUSCULAR; INTRAVENOUS ONCE
Status: COMPLETED | OUTPATIENT
Start: 2023-01-27 | End: 2023-01-27

## 2023-01-27 RX ORDER — ALBUMIN, HUMAN INJ 5% 5 %
SOLUTION INTRAVENOUS CONTINUOUS PRN
Status: DISCONTINUED | OUTPATIENT
Start: 2023-01-27 | End: 2023-01-27 | Stop reason: SURG

## 2023-01-27 RX ORDER — NITROGLYCERIN 20 MG/100ML
INJECTION INTRAVENOUS AS NEEDED
Status: DISCONTINUED | OUTPATIENT
Start: 2023-01-27 | End: 2023-01-27 | Stop reason: SURG

## 2023-01-27 RX ORDER — ALBUTEROL SULFATE 2.5 MG/3ML
2.5 SOLUTION RESPIRATORY (INHALATION) EVERY 4 HOURS PRN
Status: DISCONTINUED | OUTPATIENT
Start: 2023-01-27 | End: 2023-02-01 | Stop reason: HOSPADM

## 2023-01-27 RX ORDER — BUPIVACAINE HYDROCHLORIDE 5 MG/ML
INJECTION, SOLUTION EPIDURAL; INTRACAUDAL AS NEEDED
Status: DISCONTINUED | OUTPATIENT
Start: 2023-01-27 | End: 2023-01-27 | Stop reason: HOSPADM

## 2023-01-27 RX ORDER — DEXTROSE MONOHYDRATE 25 G/50ML
25 INJECTION, SOLUTION INTRAVENOUS
Status: DISCONTINUED | OUTPATIENT
Start: 2023-01-27 | End: 2023-01-28

## 2023-01-27 RX ORDER — NOREPINEPHRINE BIT/0.9 % NACL 8 MG/250ML
.02-.3 INFUSION BOTTLE (ML) INTRAVENOUS
Status: DISCONTINUED | OUTPATIENT
Start: 2023-01-27 | End: 2023-01-28

## 2023-01-27 RX ORDER — PROMETHAZINE HYDROCHLORIDE 25 MG/1
25 SUPPOSITORY RECTAL ONCE AS NEEDED
Status: DISCONTINUED | OUTPATIENT
Start: 2023-01-27 | End: 2023-01-27 | Stop reason: HOSPADM

## 2023-01-27 RX ORDER — HYDRALAZINE HYDROCHLORIDE 20 MG/ML
INJECTION INTRAMUSCULAR; INTRAVENOUS AS NEEDED
Status: DISCONTINUED | OUTPATIENT
Start: 2023-01-27 | End: 2023-01-27 | Stop reason: SURG

## 2023-01-27 RX ORDER — DEXTROSE MONOHYDRATE 25 G/50ML
25 INJECTION, SOLUTION INTRAVENOUS
Status: DISCONTINUED | OUTPATIENT
Start: 2023-01-27 | End: 2023-01-27

## 2023-01-27 RX ORDER — SODIUM CHLORIDE, SODIUM LACTATE, POTASSIUM CHLORIDE, CALCIUM CHLORIDE 600; 310; 30; 20 MG/100ML; MG/100ML; MG/100ML; MG/100ML
9 INJECTION, SOLUTION INTRAVENOUS CONTINUOUS PRN
Status: DISCONTINUED | OUTPATIENT
Start: 2023-01-27 | End: 2023-01-27 | Stop reason: HOSPADM

## 2023-01-27 RX ORDER — LIDOCAINE HYDROCHLORIDE 20 MG/ML
INJECTION, SOLUTION EPIDURAL; INFILTRATION; INTRACAUDAL; PERINEURAL AS NEEDED
Status: DISCONTINUED | OUTPATIENT
Start: 2023-01-27 | End: 2023-01-27 | Stop reason: SURG

## 2023-01-27 RX ORDER — OXYCODONE HYDROCHLORIDE 5 MG/1
5 TABLET ORAL
Status: DISCONTINUED | OUTPATIENT
Start: 2023-01-27 | End: 2023-01-27 | Stop reason: HOSPADM

## 2023-01-27 RX ORDER — ACETAMINOPHEN 500 MG
1000 TABLET ORAL ONCE
Status: COMPLETED | OUTPATIENT
Start: 2023-01-27 | End: 2023-01-27

## 2023-01-27 RX ORDER — CHLORHEXIDINE GLUCONATE 0.12 MG/ML
15 RINSE ORAL EVERY 12 HOURS SCHEDULED
Status: DISCONTINUED | OUTPATIENT
Start: 2023-01-27 | End: 2023-01-29

## 2023-01-27 RX ORDER — ROSUVASTATIN CALCIUM 20 MG/1
20 TABLET, COATED ORAL NIGHTLY
Status: DISCONTINUED | OUTPATIENT
Start: 2023-01-27 | End: 2023-01-28

## 2023-01-27 RX ORDER — NALOXONE HCL 0.4 MG/ML
0.4 VIAL (ML) INJECTION
Status: DISCONTINUED | OUTPATIENT
Start: 2023-01-27 | End: 2023-02-01 | Stop reason: HOSPADM

## 2023-01-27 RX ORDER — SODIUM CHLORIDE, SODIUM LACTATE, POTASSIUM CHLORIDE, CALCIUM CHLORIDE 600; 310; 30; 20 MG/100ML; MG/100ML; MG/100ML; MG/100ML
125 INJECTION, SOLUTION INTRAVENOUS CONTINUOUS
Status: DISCONTINUED | OUTPATIENT
Start: 2023-01-27 | End: 2023-01-28

## 2023-01-27 RX ORDER — DEXTROSE AND SODIUM CHLORIDE 5; .9 G/100ML; G/100ML
125 INJECTION, SOLUTION INTRAVENOUS CONTINUOUS
Status: DISCONTINUED | OUTPATIENT
Start: 2023-01-27 | End: 2023-01-27

## 2023-01-27 RX ORDER — PROCHLORPERAZINE 25 MG
25 SUPPOSITORY, RECTAL RECTAL EVERY 12 HOURS PRN
Status: DISCONTINUED | OUTPATIENT
Start: 2023-01-27 | End: 2023-01-28

## 2023-01-27 RX ADMIN — EPHEDRINE SULFATE 5 MG: 50 INJECTION INTRAVENOUS at 12:06

## 2023-01-27 RX ADMIN — FENTANYL CITRATE 50 MCG: 50 INJECTION, SOLUTION INTRAMUSCULAR; INTRAVENOUS at 12:05

## 2023-01-27 RX ADMIN — ACETAMINOPHEN 1000 MG: 500 TABLET ORAL at 10:45

## 2023-01-27 RX ADMIN — ROCURONIUM BROMIDE 20 MG: 10 INJECTION, SOLUTION INTRAVENOUS at 14:40

## 2023-01-27 RX ADMIN — Medication 100 MG: at 17:44

## 2023-01-27 RX ADMIN — NITROGLYCERIN 10 MCG: 20 INJECTION INTRAVENOUS at 14:05

## 2023-01-27 RX ADMIN — MAGNESIUM SULFATE HEPTAHYDRATE 4 G: 40 INJECTION, SOLUTION INTRAVENOUS at 22:11

## 2023-01-27 RX ADMIN — ROSUVASTATIN 20 MG: 20 TABLET, FILM COATED ORAL at 20:43

## 2023-01-27 RX ADMIN — 0.12% CHLORHEXIDINE GLUCONATE 15 ML: 1.2 RINSE ORAL at 22:45

## 2023-01-27 RX ADMIN — SODIUM CHLORIDE, POTASSIUM CHLORIDE, SODIUM LACTATE AND CALCIUM CHLORIDE: 600; 310; 30; 20 INJECTION, SOLUTION INTRAVENOUS at 15:08

## 2023-01-27 RX ADMIN — ALBUMIN HUMAN: 0.05 INJECTION, SOLUTION INTRAVENOUS at 12:23

## 2023-01-27 RX ADMIN — NITROGLYCERIN 20 MCG: 20 INJECTION INTRAVENOUS at 14:26

## 2023-01-27 RX ADMIN — NITROGLYCERIN 10 MCG: 20 INJECTION INTRAVENOUS at 14:09

## 2023-01-27 RX ADMIN — Medication 200 MG: at 17:43

## 2023-01-27 RX ADMIN — FENTANYL CITRATE 50 MCG: 50 INJECTION, SOLUTION INTRAMUSCULAR; INTRAVENOUS at 14:27

## 2023-01-27 RX ADMIN — Medication 200 MCG: at 17:49

## 2023-01-27 RX ADMIN — ASPIRIN 81 MG: 81 TABLET, COATED ORAL at 20:43

## 2023-01-27 RX ADMIN — Medication 100 MCG: at 11:42

## 2023-01-27 RX ADMIN — CEFAZOLIN SODIUM 2 G: 2 INJECTION, SOLUTION INTRAVENOUS at 11:40

## 2023-01-27 RX ADMIN — IOPAMIDOL 80 ML: 755 INJECTION, SOLUTION INTRAVENOUS at 17:44

## 2023-01-27 RX ADMIN — SUGAMMADEX 200 MG: 100 INJECTION, SOLUTION INTRAVENOUS at 15:35

## 2023-01-27 RX ADMIN — METOCLOPRAMIDE HYDROCHLORIDE 10 MG: 5 INJECTION INTRAMUSCULAR; INTRAVENOUS at 11:20

## 2023-01-27 RX ADMIN — HYDROMORPHONE HYDROCHLORIDE 0.5 MG: 1 INJECTION, SOLUTION INTRAMUSCULAR; INTRAVENOUS; SUBCUTANEOUS at 22:07

## 2023-01-27 RX ADMIN — MIDAZOLAM 2 MG: 1 INJECTION, SOLUTION INTRAMUSCULAR; INTRAVENOUS at 11:21

## 2023-01-27 RX ADMIN — HYDRALAZINE HYDROCHLORIDE 10 MG: 20 INJECTION INTRAMUSCULAR; INTRAVENOUS at 14:30

## 2023-01-27 RX ADMIN — INSULIN HUMAN 8 UNITS: 100 INJECTION, SOLUTION PARENTERAL at 16:49

## 2023-01-27 RX ADMIN — METOPROLOL TARTRATE 5 MG: 5 INJECTION INTRAVENOUS at 14:01

## 2023-01-27 RX ADMIN — ESMOLOL HYDROCHLORIDE 20 MG: 10 INJECTION INTRAVENOUS at 13:47

## 2023-01-27 RX ADMIN — ROCURONIUM BROMIDE 50 MG: 10 INJECTION, SOLUTION INTRAVENOUS at 11:31

## 2023-01-27 RX ADMIN — METOPROLOL TARTRATE 2.5 MG: 5 INJECTION INTRAVENOUS at 13:50

## 2023-01-27 RX ADMIN — EPHEDRINE SULFATE 5 MG: 50 INJECTION INTRAVENOUS at 13:04

## 2023-01-27 RX ADMIN — PHENYLEPHRINE HYDROCHLORIDE 33.33 MCG/MIN: 10 INJECTION INTRAVENOUS at 11:51

## 2023-01-27 RX ADMIN — EPHEDRINE SULFATE 10 MG: 50 INJECTION INTRAVENOUS at 13:10

## 2023-01-27 RX ADMIN — CEFAZOLIN SODIUM 2 G: 2 INJECTION, SOLUTION INTRAVENOUS at 20:43

## 2023-01-27 RX ADMIN — Medication 10 ML: at 21:07

## 2023-01-27 RX ADMIN — SODIUM CHLORIDE, POTASSIUM CHLORIDE, SODIUM LACTATE AND CALCIUM CHLORIDE 9 ML/HR: 600; 310; 30; 20 INJECTION, SOLUTION INTRAVENOUS at 10:46

## 2023-01-27 RX ADMIN — PROCHLORPERAZINE EDISYLATE 5 MG: 5 INJECTION INTRAMUSCULAR; INTRAVENOUS at 16:48

## 2023-01-27 RX ADMIN — ALBUMIN HUMAN: 0.05 INJECTION, SOLUTION INTRAVENOUS at 12:14

## 2023-01-27 RX ADMIN — ROCURONIUM BROMIDE 30 MG: 10 INJECTION, SOLUTION INTRAVENOUS at 12:19

## 2023-01-27 RX ADMIN — NITROGLYCERIN 20 MCG: 20 INJECTION INTRAVENOUS at 14:31

## 2023-01-27 RX ADMIN — HEPARIN SODIUM 500 UNITS/HR: 5000 INJECTION INTRAVENOUS; SUBCUTANEOUS at 19:06

## 2023-01-27 RX ADMIN — EPHEDRINE SULFATE 5 MG: 50 INJECTION INTRAVENOUS at 11:59

## 2023-01-27 RX ADMIN — PROPOFOL 150 MG: 10 INJECTION, EMULSION INTRAVENOUS at 11:31

## 2023-01-27 RX ADMIN — HEPARIN SODIUM 3000 UNITS: 1000 INJECTION, SOLUTION INTRAVENOUS; SUBCUTANEOUS at 13:26

## 2023-01-27 RX ADMIN — MIDAZOLAM HYDROCHLORIDE 2 MG: 1 INJECTION, SOLUTION INTRAMUSCULAR; INTRAVENOUS at 17:39

## 2023-01-27 RX ADMIN — HEPARIN SODIUM 1000 UNITS: 1000 INJECTION, SOLUTION INTRAVENOUS; SUBCUTANEOUS at 13:39

## 2023-01-27 RX ADMIN — DEXMEDETOMIDINE HYDROCHLORIDE 0.2 MCG/KG/HR: 4 INJECTION, SOLUTION INTRAVENOUS at 19:48

## 2023-01-27 RX ADMIN — INSULIN HUMAN 7 UNITS: 100 INJECTION, SOLUTION PARENTERAL at 20:43

## 2023-01-27 RX ADMIN — LIDOCAINE HYDROCHLORIDE 60 MG: 20 INJECTION, SOLUTION EPIDURAL; INFILTRATION; INTRACAUDAL; PERINEURAL at 11:31

## 2023-01-27 RX ADMIN — CALCIUM CHLORIDE 300 MG: 100 INJECTION INTRAVENOUS; INTRAVENTRICULAR at 12:27

## 2023-01-27 RX ADMIN — Medication 10 ML: at 22:28

## 2023-01-27 RX ADMIN — LISINOPRIL 20 MG: 20 TABLET ORAL at 20:43

## 2023-01-27 RX ADMIN — NALOXONE HYDROCHLORIDE 0.2 MG: 0.4 INJECTION, SOLUTION INTRAMUSCULAR; INTRAVENOUS; SUBCUTANEOUS at 15:54

## 2023-01-27 RX ADMIN — ROCURONIUM BROMIDE 20 MG: 10 INJECTION, SOLUTION INTRAVENOUS at 13:48

## 2023-01-27 RX ADMIN — DEXTROSE AND SODIUM CHLORIDE 125 ML/HR: 5; 900 INJECTION, SOLUTION INTRAVENOUS at 10:50

## 2023-01-27 RX ADMIN — CALCIUM CHLORIDE 100 MG: 100 INJECTION INTRAVENOUS; INTRAVENTRICULAR at 12:32

## 2023-01-27 RX ADMIN — ITRACONAZOLE 200 MG: 100 CAPSULE, COATED PELLETS ORAL at 20:43

## 2023-01-27 RX ADMIN — SODIUM CHLORIDE, POTASSIUM CHLORIDE, SODIUM LACTATE AND CALCIUM CHLORIDE: 600; 310; 30; 20 INJECTION, SOLUTION INTRAVENOUS at 14:00

## 2023-01-27 RX ADMIN — FENTANYL CITRATE 50 MCG: 50 INJECTION, SOLUTION INTRAMUSCULAR; INTRAVENOUS at 11:29

## 2023-01-27 RX ADMIN — EPHEDRINE SULFATE 5 MG: 50 INJECTION INTRAVENOUS at 12:24

## 2023-01-27 RX ADMIN — CALCIUM CHLORIDE 300 MG: 100 INJECTION INTRAVENOUS; INTRAVENTRICULAR at 12:38

## 2023-01-27 RX ADMIN — IOPAMIDOL 100 ML: 755 INJECTION, SOLUTION INTRAVENOUS at 17:45

## 2023-01-27 RX ADMIN — CALCIUM CHLORIDE 100 MG: 100 INJECTION INTRAVENOUS; INTRAVENTRICULAR at 12:42

## 2023-01-27 RX ADMIN — HEPARIN SODIUM 6000 UNITS: 1000 INJECTION, SOLUTION INTRAVENOUS; SUBCUTANEOUS at 13:12

## 2023-01-27 NOTE — ADDENDUM NOTE
Addendum  created 01/27/23 1633 by Patrick Ferrara MD    Order list changed, Pharmacy for encounter modified

## 2023-01-27 NOTE — ANESTHESIA POSTPROCEDURE EVALUATION
Patient: Tyrone Garcia    Procedure Summary     Date: 01/27/23 Room / Location: [facility] OR 01 / [facility] MAIN OR    Anesthesia Start: 1126 Anesthesia Stop:     Procedure: CAROTID ENDARTERECTOMY WITH EEG (Right: Neck) Diagnosis:       Stenosis of right carotid artery      (Stenosis of right carotid artery [I65.21])    Surgeons: Maggie Spencer MD Provider: Susana Longoria CRNA    Anesthesia Type: generalAshly ASA Status: 3          Anesthesia Type: general, Ashly    Vitals  Vitals Value Taken Time   BP     Temp     Pulse 90 01/27/23 1611   Resp     SpO2 93 % 01/27/23 1611   Vitals shown include unvalidated device data.        Post Anesthesia Care and Evaluation    Patient location during evaluation: bedside (pt level of alertness decreased over 45 min in pacu, nausea worsened. L hand weakness slightly worsened.  Surgeon aware and emergent CT performed)  Patient participation: complete - patient participated  Level of consciousness: confused  Pain management: satisfactory to patient    Airway patency: patent  Anesthetic complications: No anesthetic complications  PONV Status: none  Cardiovascular status: acceptable  Respiratory status: acceptable  Hydration status: acceptable    Comments: An Anesthesiologist personally participated in the most demanding procedures (including induction and emergence if applicable) in the anesthesia plan, monitored the course of anesthesia administration at frequent intervals and remained physically present and available for immediate diagnosis and treatment of emergencies.      
Patient: Tyrone Garcia    Procedure Summary     Date: 01/27/23 Room / Location: [facility] OR 01 / [facility] MAIN OR    Anesthesia Start: 1126 Anesthesia Stop:     Procedure: CAROTID ENDARTERECTOMY WITH EEG (Right: Neck) Diagnosis:       Stenosis of right carotid artery      (Stenosis of right carotid artery [I65.21])    Surgeons: Maggie Spencer MD Provider: Susana Longoria CRNA    Anesthesia Type: generalAshly ASA Status: 3          Anesthesia Type: general, Ashly    Vitals  Vitals Value Taken Time   BP     Temp     Pulse 93 01/27/23 1609   Resp     SpO2 88 % 01/27/23 1609   Vitals shown include unvalidated device data.        Post Anesthesia Care and Evaluation    Patient location during evaluation: bedside  Patient participation: complete - patient participated  Level of consciousness: awake and responsive to verbal stimuli  Pain management: adequate    Airway patency: patent  Anesthetic complications: No anesthetic complications  PONV Status: none  Cardiovascular status: acceptable  Respiratory status: acceptable  Hydration status: acceptable    Comments: An Anesthesiologist personally participated in the most demanding procedures (including induction and emergence if applicable) in the anesthesia plan, monitored the course of anesthesia administration at frequent intervals and remained physically present and available for immediate diagnosis and treatment of emergencies.      
Male

## 2023-01-27 NOTE — ANESTHESIA PROCEDURE NOTES
Arterial Line      Patient reassessed immediately prior to procedure    Patient location during procedure: holding area  Start time: 1/27/2023 11:10 AM  Stop Time:1/27/2023 11:15 AM       Performed By   CRNA/CAA: Susana Longoria CRNA   Preanesthetic Checklist  Completed: patient identified, surgical consent, monitors and equipment checked and pre-op evaluation  Arterial Line Prep    Sterile Tech: cap, gloves and mask  Prep: ChloraPrep  Patient monitoring: blood pressure monitoring, continuous pulse oximetry and EKG  Arterial Line Procedure   Laterality:left  Location:  radial artery  Catheter size: 20 G   Guidance: landmark technique  Number of attempts: 1  Successful placement: yes   Post Assessment   Dressing Type: occlusive dressing applied, secured with tape and wrist guard applied.   Complications no  Circ/Move/Sens Assessment: normal.   Patient Tolerance: patient tolerated the procedure well with no apparent complications

## 2023-01-27 NOTE — ADDENDUM NOTE
Addendum  created 01/27/23 1819 by Shahida Miller CRNA    Intraprocedure Event edited, Intraprocedure Meds edited, LDA properties accepted

## 2023-01-27 NOTE — ADDENDUM NOTE
Addendum  created 01/27/23 1628 by Patrick Ferrara MD    Order list changed, Pharmacy for encounter modified

## 2023-01-27 NOTE — ADDENDUM NOTE
Addendum  created 01/27/23 1644 by Flory Ying CRNA    Order list changed, Pharmacy for encounter modified

## 2023-01-27 NOTE — ANESTHESIA PREPROCEDURE EVALUATION
Anesthesia Evaluation     Patient summary reviewed and Nursing notes reviewed   no history of anesthetic complications:  NPO Solid Status: > 8 hours  NPO Liquid Status: > 2 hours           Airway   Mallampati: I  TM distance: >3 FB  Neck ROM: full  No difficulty expected  Dental    (+) edentulous    Pulmonary - normal exam    breath sounds clear to auscultation  (+) COPD,   Cardiovascular - normal exam  Exercise tolerance: good (4-7 METS)    Rhythm: regular  Rate: normal    (+) hypertension,  carotid artery disease      Neuro/Psych  (+) CVA, numbness,    GI/Hepatic/Renal/Endo    (+)   diabetes mellitus type 2,     Musculoskeletal (-) negative ROS    Abdominal    Substance History - negative use     OB/GYN negative ob/gyn ROS         Other - negative ROS       ROS/Med Hx Other: <4METS, SOAE, HX COPD, HISTOPLASMOSIS, O2 2L CONT. HX ADM 1/1/23 CVA, CAROTID STENOSIS. ECHO 1/10/23 EF 55%, NO VALVE STENOSIS. 1/3/23 CAROTID DUPLEX: Right ICA Prox:  Imaging indicates 80-99% stenosis.  KT                   Anesthesia Plan    ASA 3     general and Ashly     (Patient understands anesthesia not responsible for dental damage.    )  intravenous induction     Anesthetic plan, risks, benefits, and alternatives have been provided, discussed and informed consent has been obtained with: patient.    Use of blood products discussed with patient .   Plan discussed with CRNA.        CODE STATUS:

## 2023-01-28 ENCOUNTER — APPOINTMENT (OUTPATIENT)
Dept: MRI IMAGING | Facility: HOSPITAL | Age: 63
DRG: 37 | End: 2023-01-28
Payer: MEDICARE

## 2023-01-28 LAB
ALBUMIN SERPL-MCNC: 3.7 G/DL (ref 3.5–5.2)
ALBUMIN/GLOB SERPL: 1.4 G/DL
ALP SERPL-CCNC: 97 U/L (ref 39–117)
ALT SERPL W P-5'-P-CCNC: 12 U/L (ref 1–41)
ANION GAP SERPL CALCULATED.3IONS-SCNC: 6.9 MMOL/L (ref 5–15)
APTT PPP: 32.8 SECONDS (ref 24.2–34.2)
AST SERPL-CCNC: 16 U/L (ref 1–40)
BASOPHILS # BLD AUTO: 0.02 10*3/MM3 (ref 0–0.2)
BASOPHILS NFR BLD AUTO: 0.2 % (ref 0–1.5)
BILIRUB SERPL-MCNC: 0.3 MG/DL (ref 0–1.2)
BUN SERPL-MCNC: 21 MG/DL (ref 8–23)
BUN/CREAT SERPL: 29.6 (ref 7–25)
CALCIUM SPEC-SCNC: 9 MG/DL (ref 8.6–10.5)
CHLORIDE SERPL-SCNC: 100 MMOL/L (ref 98–107)
CHOLEST SERPL-MCNC: 90 MG/DL (ref 0–200)
CO2 SERPL-SCNC: 31.1 MMOL/L (ref 22–29)
CREAT SERPL-MCNC: 0.71 MG/DL (ref 0.76–1.27)
DEPRECATED RDW RBC AUTO: 44.2 FL (ref 37–54)
EGFRCR SERPLBLD CKD-EPI 2021: 103.1 ML/MIN/1.73
EOSINOPHIL # BLD AUTO: 0.02 10*3/MM3 (ref 0–0.4)
EOSINOPHIL NFR BLD AUTO: 0.2 % (ref 0.3–6.2)
ERYTHROCYTE [DISTWIDTH] IN BLOOD BY AUTOMATED COUNT: 13.9 % (ref 12.3–15.4)
GLOBULIN UR ELPH-MCNC: 2.7 GM/DL
GLUCOSE BLDC GLUCOMTR-MCNC: 118 MG/DL (ref 70–99)
GLUCOSE BLDC GLUCOMTR-MCNC: 180 MG/DL (ref 70–99)
GLUCOSE BLDC GLUCOMTR-MCNC: 195 MG/DL (ref 70–99)
GLUCOSE BLDC GLUCOMTR-MCNC: 273 MG/DL (ref 70–99)
GLUCOSE BLDC GLUCOMTR-MCNC: 306 MG/DL (ref 70–99)
GLUCOSE BLDC GLUCOMTR-MCNC: 307 MG/DL (ref 70–99)
GLUCOSE BLDC GLUCOMTR-MCNC: 309 MG/DL (ref 70–99)
GLUCOSE BLDC GLUCOMTR-MCNC: 328 MG/DL (ref 70–99)
GLUCOSE BLDC GLUCOMTR-MCNC: 347 MG/DL (ref 70–99)
GLUCOSE SERPL-MCNC: 163 MG/DL (ref 65–99)
HBA1C MFR BLD: 8.5 % (ref 4.8–5.6)
HCT VFR BLD AUTO: 30.7 % (ref 37.5–51)
HDLC SERPL-MCNC: 51 MG/DL (ref 40–60)
HGB BLD-MCNC: 10.5 G/DL (ref 13–17.7)
IMM GRANULOCYTES # BLD AUTO: 0.03 10*3/MM3 (ref 0–0.05)
IMM GRANULOCYTES NFR BLD AUTO: 0.3 % (ref 0–0.5)
INR PPP: 1.08 (ref 0.86–1.15)
LDLC SERPL CALC-MCNC: 28 MG/DL (ref 0–100)
LDLC/HDLC SERPL: 0.63 {RATIO}
LYMPHOCYTES # BLD AUTO: 0.79 10*3/MM3 (ref 0.7–3.1)
LYMPHOCYTES NFR BLD AUTO: 8.8 % (ref 19.6–45.3)
MAGNESIUM SERPL-MCNC: 2.2 MG/DL (ref 1.6–2.4)
MCH RBC QN AUTO: 30 PG (ref 26.6–33)
MCHC RBC AUTO-ENTMCNC: 34.2 G/DL (ref 31.5–35.7)
MCV RBC AUTO: 87.7 FL (ref 79–97)
MONOCYTES # BLD AUTO: 0.65 10*3/MM3 (ref 0.1–0.9)
MONOCYTES NFR BLD AUTO: 7.2 % (ref 5–12)
NEUTROPHILS NFR BLD AUTO: 7.49 10*3/MM3 (ref 1.7–7)
NEUTROPHILS NFR BLD AUTO: 83.3 % (ref 42.7–76)
NRBC BLD AUTO-RTO: 0 /100 WBC (ref 0–0.2)
PHOSPHATE SERPL-MCNC: 2.6 MG/DL (ref 2.5–4.5)
PLATELET # BLD AUTO: 187 10*3/MM3 (ref 140–450)
PMV BLD AUTO: 9.6 FL (ref 6–12)
POTASSIUM SERPL-SCNC: 3.8 MMOL/L (ref 3.5–5.2)
PROT SERPL-MCNC: 6.4 G/DL (ref 6–8.5)
PROTHROMBIN TIME: 14.1 SECONDS (ref 11.8–14.9)
RBC # BLD AUTO: 3.5 10*6/MM3 (ref 4.14–5.8)
SODIUM SERPL-SCNC: 138 MMOL/L (ref 136–145)
TRIGL SERPL-MCNC: 35 MG/DL (ref 0–150)
VLDLC SERPL-MCNC: 11 MG/DL (ref 5–40)
WBC NRBC COR # BLD: 9 10*3/MM3 (ref 3.4–10.8)

## 2023-01-28 PROCEDURE — 99291 CRITICAL CARE FIRST HOUR: CPT | Performed by: INTERNAL MEDICINE

## 2023-01-28 PROCEDURE — 94799 UNLISTED PULMONARY SVC/PX: CPT

## 2023-01-28 PROCEDURE — 94003 VENT MGMT INPAT SUBQ DAY: CPT

## 2023-01-28 PROCEDURE — 70551 MRI BRAIN STEM W/O DYE: CPT

## 2023-01-28 PROCEDURE — 80061 LIPID PANEL: CPT | Performed by: SURGERY

## 2023-01-28 PROCEDURE — A9270 NON-COVERED ITEM OR SERVICE: HCPCS | Performed by: SURGERY

## 2023-01-28 PROCEDURE — 82962 GLUCOSE BLOOD TEST: CPT

## 2023-01-28 PROCEDURE — A9270 NON-COVERED ITEM OR SERVICE: HCPCS | Performed by: PHYSICIAN ASSISTANT

## 2023-01-28 PROCEDURE — 85025 COMPLETE CBC W/AUTO DIFF WBC: CPT | Performed by: SURGERY

## 2023-01-28 PROCEDURE — 63710000001 ASPIRIN 81 MG CHEWABLE TABLET: Performed by: SURGERY

## 2023-01-28 PROCEDURE — 25010000002 ONDANSETRON PER 1 MG: Performed by: SURGERY

## 2023-01-28 PROCEDURE — 63710000001 LISINOPRIL 20 MG TABLET: Performed by: SURGERY

## 2023-01-28 PROCEDURE — 63710000001: Performed by: SURGERY

## 2023-01-28 PROCEDURE — 83735 ASSAY OF MAGNESIUM: CPT | Performed by: PHYSICIAN ASSISTANT

## 2023-01-28 PROCEDURE — 25010000002 CEFAZOLIN IN DEXTROSE 2-4 GM/100ML-% SOLUTION: Performed by: SURGERY

## 2023-01-28 PROCEDURE — 25010000002 HYDROMORPHONE 1 MG/ML SOLUTION: Performed by: SURGERY

## 2023-01-28 PROCEDURE — 85730 THROMBOPLASTIN TIME PARTIAL: CPT | Performed by: SURGERY

## 2023-01-28 PROCEDURE — 85610 PROTHROMBIN TIME: CPT | Performed by: SURGERY

## 2023-01-28 PROCEDURE — 80053 COMPREHEN METABOLIC PANEL: CPT | Performed by: PHYSICIAN ASSISTANT

## 2023-01-28 PROCEDURE — 84100 ASSAY OF PHOSPHORUS: CPT | Performed by: PHYSICIAN ASSISTANT

## 2023-01-28 PROCEDURE — 63710000001 INSULIN REGULAR HUMAN PER 5 UNITS: Performed by: PHYSICIAN ASSISTANT

## 2023-01-28 PROCEDURE — 83036 HEMOGLOBIN GLYCOSYLATED A1C: CPT | Performed by: SURGERY

## 2023-01-28 RX ORDER — ITRACONAZOLE 100 MG/1
200 CAPSULE ORAL 2 TIMES DAILY WITH MEALS
Status: DISCONTINUED | OUTPATIENT
Start: 2023-01-28 | End: 2023-01-28

## 2023-01-28 RX ORDER — DEXTROSE MONOHYDRATE 25 G/50ML
10-50 INJECTION, SOLUTION INTRAVENOUS
Status: DISCONTINUED | OUTPATIENT
Start: 2023-01-28 | End: 2023-01-30

## 2023-01-28 RX ORDER — SODIUM CHLORIDE 0.9 % (FLUSH) 0.9 %
10 SYRINGE (ML) INJECTION AS NEEDED
Status: DISCONTINUED | OUTPATIENT
Start: 2023-01-28 | End: 2023-01-30

## 2023-01-28 RX ORDER — ONDANSETRON 2 MG/ML
4 INJECTION INTRAMUSCULAR; INTRAVENOUS EVERY 6 HOURS PRN
Status: DISCONTINUED | OUTPATIENT
Start: 2023-01-28 | End: 2023-02-01 | Stop reason: HOSPADM

## 2023-01-28 RX ORDER — ROSUVASTATIN CALCIUM 20 MG/1
20 TABLET, COATED ORAL NIGHTLY
Status: DISCONTINUED | OUTPATIENT
Start: 2023-01-28 | End: 2023-01-28

## 2023-01-28 RX ORDER — PROCHLORPERAZINE EDISYLATE 5 MG/ML
5 INJECTION INTRAMUSCULAR; INTRAVENOUS EVERY 6 HOURS PRN
Status: DISCONTINUED | OUTPATIENT
Start: 2023-01-28 | End: 2023-02-01 | Stop reason: HOSPADM

## 2023-01-28 RX ORDER — ACETAMINOPHEN 325 MG/1
650 TABLET ORAL EVERY 6 HOURS PRN
Status: DISCONTINUED | OUTPATIENT
Start: 2023-01-28 | End: 2023-01-28

## 2023-01-28 RX ORDER — PROCHLORPERAZINE 25 MG
25 SUPPOSITORY, RECTAL RECTAL EVERY 12 HOURS PRN
Status: DISCONTINUED | OUTPATIENT
Start: 2023-01-28 | End: 2023-02-01 | Stop reason: HOSPADM

## 2023-01-28 RX ORDER — CLOPIDOGREL BISULFATE 75 MG/1
75 TABLET ORAL DAILY
Status: DISCONTINUED | OUTPATIENT
Start: 2023-01-29 | End: 2023-01-29

## 2023-01-28 RX ORDER — ITRACONAZOLE 100 MG/1
200 CAPSULE ORAL 2 TIMES DAILY WITH MEALS
Status: DISCONTINUED | OUTPATIENT
Start: 2023-01-28 | End: 2023-02-01 | Stop reason: HOSPADM

## 2023-01-28 RX ORDER — ITRACONAZOLE 100 MG/1
200 CAPSULE ORAL DAILY
Status: ON HOLD | COMMUNITY
Start: 2023-01-26 | End: 2023-01-28

## 2023-01-28 RX ORDER — PROCHLORPERAZINE MALEATE 5 MG/1
5 TABLET ORAL EVERY 6 HOURS PRN
Status: DISCONTINUED | OUTPATIENT
Start: 2023-01-28 | End: 2023-01-28

## 2023-01-28 RX ORDER — PROCHLORPERAZINE MALEATE 5 MG/1
5 TABLET ORAL EVERY 6 HOURS PRN
Status: DISCONTINUED | OUTPATIENT
Start: 2023-01-28 | End: 2023-02-01 | Stop reason: HOSPADM

## 2023-01-28 RX ORDER — PROCHLORPERAZINE EDISYLATE 5 MG/ML
5 INJECTION INTRAMUSCULAR; INTRAVENOUS EVERY 6 HOURS PRN
Status: DISCONTINUED | OUTPATIENT
Start: 2023-01-28 | End: 2023-01-28

## 2023-01-28 RX ORDER — SODIUM CHLORIDE 9 MG/ML
40 INJECTION, SOLUTION INTRAVENOUS AS NEEDED
Status: DISCONTINUED | OUTPATIENT
Start: 2023-01-28 | End: 2023-01-30

## 2023-01-28 RX ORDER — ATORVASTATIN CALCIUM 40 MG/1
80 TABLET, FILM COATED ORAL NIGHTLY
Status: DISCONTINUED | OUTPATIENT
Start: 2023-01-28 | End: 2023-01-28

## 2023-01-28 RX ORDER — ROSUVASTATIN CALCIUM 20 MG/1
40 TABLET, COATED ORAL NIGHTLY
Status: DISCONTINUED | OUTPATIENT
Start: 2023-01-28 | End: 2023-02-01 | Stop reason: HOSPADM

## 2023-01-28 RX ORDER — IRBESARTAN 150 MG/1
150 TABLET ORAL DAILY
COMMUNITY
Start: 2023-01-11

## 2023-01-28 RX ORDER — BUDESONIDE, GLYCOPYRROLATE, AND FORMOTEROL FUMARATE 160; 9; 4.8 UG/1; UG/1; UG/1
2 AEROSOL, METERED RESPIRATORY (INHALATION) 2 TIMES DAILY
COMMUNITY
Start: 2023-01-11

## 2023-01-28 RX ORDER — ONDANSETRON 4 MG/1
4 TABLET, FILM COATED ORAL EVERY 6 HOURS PRN
Status: DISCONTINUED | OUTPATIENT
Start: 2023-01-28 | End: 2023-02-01 | Stop reason: HOSPADM

## 2023-01-28 RX ORDER — SODIUM CHLORIDE 0.9 % (FLUSH) 0.9 %
10 SYRINGE (ML) INJECTION EVERY 12 HOURS SCHEDULED
Status: DISCONTINUED | OUTPATIENT
Start: 2023-01-28 | End: 2023-01-30

## 2023-01-28 RX ORDER — NICOTINE POLACRILEX 4 MG
15 LOZENGE BUCCAL
Status: DISCONTINUED | OUTPATIENT
Start: 2023-01-28 | End: 2023-01-30

## 2023-01-28 RX ORDER — LISINOPRIL 20 MG/1
20 TABLET ORAL 2 TIMES DAILY
Status: DISCONTINUED | OUTPATIENT
Start: 2023-01-28 | End: 2023-02-01 | Stop reason: HOSPADM

## 2023-01-28 RX ORDER — ASPIRIN 81 MG/1
81 TABLET, CHEWABLE ORAL DAILY
Status: DISCONTINUED | OUTPATIENT
Start: 2023-01-28 | End: 2023-01-28

## 2023-01-28 RX ORDER — LISINOPRIL 20 MG/1
20 TABLET ORAL 2 TIMES DAILY
Status: DISCONTINUED | OUTPATIENT
Start: 2023-01-28 | End: 2023-01-28

## 2023-01-28 RX ORDER — ONDANSETRON 4 MG/1
4 TABLET, FILM COATED ORAL EVERY 6 HOURS PRN
Status: DISCONTINUED | OUTPATIENT
Start: 2023-01-28 | End: 2023-01-28

## 2023-01-28 RX ORDER — ACETAMINOPHEN 325 MG/1
650 TABLET ORAL EVERY 6 HOURS PRN
Status: DISCONTINUED | OUTPATIENT
Start: 2023-01-28 | End: 2023-02-01 | Stop reason: HOSPADM

## 2023-01-28 RX ORDER — ONDANSETRON 2 MG/ML
4 INJECTION INTRAMUSCULAR; INTRAVENOUS EVERY 6 HOURS PRN
Status: DISCONTINUED | OUTPATIENT
Start: 2023-01-28 | End: 2023-01-28

## 2023-01-28 RX ORDER — CLOPIDOGREL BISULFATE 75 MG/1
75 TABLET ORAL DAILY
Status: DISCONTINUED | OUTPATIENT
Start: 2023-01-28 | End: 2023-01-28

## 2023-01-28 RX ORDER — SODIUM CHLORIDE, SODIUM LACTATE, POTASSIUM CHLORIDE, CALCIUM CHLORIDE 600; 310; 30; 20 MG/100ML; MG/100ML; MG/100ML; MG/100ML
125 INJECTION, SOLUTION INTRAVENOUS CONTINUOUS
Status: DISCONTINUED | OUTPATIENT
Start: 2023-01-28 | End: 2023-01-30

## 2023-01-28 RX ORDER — ASPIRIN 81 MG/1
81 TABLET ORAL DAILY
Status: DISCONTINUED | OUTPATIENT
Start: 2023-01-29 | End: 2023-02-01 | Stop reason: HOSPADM

## 2023-01-28 RX ORDER — PROCHLORPERAZINE 25 MG
25 SUPPOSITORY, RECTAL RECTAL EVERY 12 HOURS PRN
Status: DISCONTINUED | OUTPATIENT
Start: 2023-01-28 | End: 2023-01-28

## 2023-01-28 RX ADMIN — DEXMEDETOMIDINE HYDROCHLORIDE 1 MCG/KG/HR: 4 INJECTION, SOLUTION INTRAVENOUS at 04:14

## 2023-01-28 RX ADMIN — INSULIN HUMAN 2 UNITS/HR: 1 INJECTION, SOLUTION INTRAVENOUS at 20:42

## 2023-01-28 RX ADMIN — ACETAMINOPHEN 650 MG: 325 TABLET ORAL at 14:19

## 2023-01-28 RX ADMIN — Medication 10 ML: at 22:04

## 2023-01-28 RX ADMIN — INSULIN HUMAN 7 UNITS: 100 INJECTION, SOLUTION PARENTERAL at 12:21

## 2023-01-28 RX ADMIN — ITRACONAZOLE 200 MG: 100 CAPSULE, COATED PELLETS ORAL at 09:19

## 2023-01-28 RX ADMIN — ROSUVASTATIN 40 MG: 20 TABLET, FILM COATED ORAL at 22:03

## 2023-01-28 RX ADMIN — DEXTROSE MONOHYDRATE 50 ML: 25 INJECTION, SOLUTION INTRAVENOUS at 20:26

## 2023-01-28 RX ADMIN — INSULIN HUMAN 2 UNITS: 100 INJECTION, SOLUTION PARENTERAL at 06:02

## 2023-01-28 RX ADMIN — ONDANSETRON 4 MG: 2 INJECTION INTRAMUSCULAR; INTRAVENOUS at 13:38

## 2023-01-28 RX ADMIN — LISINOPRIL 20 MG: 20 TABLET ORAL at 09:19

## 2023-01-28 RX ADMIN — LISINOPRIL 20 MG: 20 TABLET ORAL at 22:03

## 2023-01-28 RX ADMIN — INSULIN HUMAN 6 UNITS: 100 INJECTION, SOLUTION PARENTERAL at 00:08

## 2023-01-28 RX ADMIN — CEFAZOLIN SODIUM 2 G: 2 INJECTION, SOLUTION INTRAVENOUS at 12:21

## 2023-01-28 RX ADMIN — ITRACONAZOLE 200 MG: 100 CAPSULE, COATED PELLETS ORAL at 18:25

## 2023-01-28 RX ADMIN — CEFAZOLIN SODIUM 2 G: 2 INJECTION, SOLUTION INTRAVENOUS at 03:44

## 2023-01-28 RX ADMIN — HYDROMORPHONE HYDROCHLORIDE 0.5 MG: 1 INJECTION, SOLUTION INTRAMUSCULAR; INTRAVENOUS; SUBCUTANEOUS at 01:24

## 2023-01-28 RX ADMIN — ACETAMINOPHEN 650 MG: 325 TABLET ORAL at 23:56

## 2023-01-28 RX ADMIN — INSULIN HUMAN 4.9 UNITS/HR: 1 INJECTION, SOLUTION INTRAVENOUS at 13:34

## 2023-01-28 RX ADMIN — CEFAZOLIN SODIUM 2 G: 2 INJECTION, SOLUTION INTRAVENOUS at 22:02

## 2023-01-28 RX ADMIN — HYDROMORPHONE HYDROCHLORIDE 0.5 MG: 1 INJECTION, SOLUTION INTRAMUSCULAR; INTRAVENOUS; SUBCUTANEOUS at 04:59

## 2023-01-28 RX ADMIN — Medication 10 ML: at 22:05

## 2023-01-28 RX ADMIN — ASPIRIN 81 MG CHEWABLE TABLET 81 MG: 81 TABLET CHEWABLE at 09:19

## 2023-01-28 NOTE — OUTREACH NOTE
Medical Week 2 Survey    Flowsheet Row Responses   [facility] patient discharged from? [facility]   Does the patient have one of the following disease processes/diagnoses(primary or secondary)? Other   Week 2 attempt successful? No   Unsuccessful attempts Attempt 1   Revoke Readmitted          KIMBERLYN BARBER - Registered Nurse

## 2023-01-29 LAB
ANION GAP SERPL CALCULATED.3IONS-SCNC: 5.7 MMOL/L (ref 5–15)
BASOPHILS # BLD AUTO: 0.08 10*3/MM3 (ref 0–0.2)
BASOPHILS NFR BLD AUTO: 0.8 % (ref 0–1.5)
BUN SERPL-MCNC: 11 MG/DL (ref 8–23)
BUN/CREAT SERPL: 18.3 (ref 7–25)
CALCIUM SPEC-SCNC: 8.6 MG/DL (ref 8.6–10.5)
CHLORIDE SERPL-SCNC: 102 MMOL/L (ref 98–107)
CO2 SERPL-SCNC: 32.3 MMOL/L (ref 22–29)
CREAT SERPL-MCNC: 0.6 MG/DL (ref 0.76–1.27)
DEPRECATED RDW RBC AUTO: 47.4 FL (ref 37–54)
EGFRCR SERPLBLD CKD-EPI 2021: 108.5 ML/MIN/1.73
EOSINOPHIL # BLD AUTO: 0.48 10*3/MM3 (ref 0–0.4)
EOSINOPHIL NFR BLD AUTO: 4.7 % (ref 0.3–6.2)
ERYTHROCYTE [DISTWIDTH] IN BLOOD BY AUTOMATED COUNT: 14.3 % (ref 12.3–15.4)
GLUCOSE BLDC GLUCOMTR-MCNC: 104 MG/DL (ref 70–99)
GLUCOSE BLDC GLUCOMTR-MCNC: 113 MG/DL (ref 70–99)
GLUCOSE BLDC GLUCOMTR-MCNC: 113 MG/DL (ref 70–99)
GLUCOSE BLDC GLUCOMTR-MCNC: 117 MG/DL (ref 70–99)
GLUCOSE BLDC GLUCOMTR-MCNC: 117 MG/DL (ref 70–99)
GLUCOSE BLDC GLUCOMTR-MCNC: 122 MG/DL (ref 70–99)
GLUCOSE BLDC GLUCOMTR-MCNC: 131 MG/DL (ref 70–99)
GLUCOSE BLDC GLUCOMTR-MCNC: 132 MG/DL (ref 70–99)
GLUCOSE BLDC GLUCOMTR-MCNC: 138 MG/DL (ref 70–99)
GLUCOSE BLDC GLUCOMTR-MCNC: 146 MG/DL (ref 70–99)
GLUCOSE BLDC GLUCOMTR-MCNC: 147 MG/DL (ref 70–99)
GLUCOSE BLDC GLUCOMTR-MCNC: 150 MG/DL (ref 70–99)
GLUCOSE BLDC GLUCOMTR-MCNC: 178 MG/DL (ref 70–99)
GLUCOSE BLDC GLUCOMTR-MCNC: 187 MG/DL (ref 70–99)
GLUCOSE BLDC GLUCOMTR-MCNC: 197 MG/DL (ref 70–99)
GLUCOSE BLDC GLUCOMTR-MCNC: 211 MG/DL (ref 70–99)
GLUCOSE BLDC GLUCOMTR-MCNC: 285 MG/DL (ref 70–99)
GLUCOSE BLDC GLUCOMTR-MCNC: 60 MG/DL (ref 70–99)
GLUCOSE BLDC GLUCOMTR-MCNC: 62 MG/DL (ref 70–99)
GLUCOSE BLDC GLUCOMTR-MCNC: 63 MG/DL (ref 70–99)
GLUCOSE BLDC GLUCOMTR-MCNC: 66 MG/DL (ref 70–99)
GLUCOSE BLDC GLUCOMTR-MCNC: 73 MG/DL (ref 70–99)
GLUCOSE BLDC GLUCOMTR-MCNC: 84 MG/DL (ref 70–99)
GLUCOSE BLDC GLUCOMTR-MCNC: 87 MG/DL (ref 70–99)
GLUCOSE BLDC GLUCOMTR-MCNC: 92 MG/DL (ref 70–99)
GLUCOSE BLDC GLUCOMTR-MCNC: 93 MG/DL (ref 70–99)
GLUCOSE BLDC GLUCOMTR-MCNC: 94 MG/DL (ref 70–99)
GLUCOSE BLDC GLUCOMTR-MCNC: 95 MG/DL (ref 70–99)
GLUCOSE BLDC GLUCOMTR-MCNC: 99 MG/DL (ref 70–99)
GLUCOSE SERPL-MCNC: 151 MG/DL (ref 65–99)
HCT VFR BLD AUTO: 35.1 % (ref 37.5–51)
HGB BLD-MCNC: 11.8 G/DL (ref 13–17.7)
IMM GRANULOCYTES # BLD AUTO: 0.03 10*3/MM3 (ref 0–0.05)
IMM GRANULOCYTES NFR BLD AUTO: 0.3 % (ref 0–0.5)
INR PPP: 1.14 (ref 0.86–1.15)
LYMPHOCYTES # BLD AUTO: 1.67 10*3/MM3 (ref 0.7–3.1)
LYMPHOCYTES NFR BLD AUTO: 16.3 % (ref 19.6–45.3)
MCH RBC QN AUTO: 30.3 PG (ref 26.6–33)
MCHC RBC AUTO-ENTMCNC: 33.6 G/DL (ref 31.5–35.7)
MCV RBC AUTO: 90 FL (ref 79–97)
MONOCYTES # BLD AUTO: 1.03 10*3/MM3 (ref 0.1–0.9)
MONOCYTES NFR BLD AUTO: 10.1 % (ref 5–12)
NEUTROPHILS NFR BLD AUTO: 6.95 10*3/MM3 (ref 1.7–7)
NEUTROPHILS NFR BLD AUTO: 67.8 % (ref 42.7–76)
NRBC BLD AUTO-RTO: 0 /100 WBC (ref 0–0.2)
PLATELET # BLD AUTO: 206 10*3/MM3 (ref 140–450)
PMV BLD AUTO: 9.8 FL (ref 6–12)
POTASSIUM SERPL-SCNC: 3.9 MMOL/L (ref 3.5–5.2)
PROTHROMBIN TIME: 14.8 SECONDS (ref 11.8–14.9)
RBC # BLD AUTO: 3.9 10*6/MM3 (ref 4.14–5.8)
SODIUM SERPL-SCNC: 140 MMOL/L (ref 136–145)
WBC NRBC COR # BLD: 10.24 10*3/MM3 (ref 3.4–10.8)

## 2023-01-29 PROCEDURE — 80048 BASIC METABOLIC PNL TOTAL CA: CPT | Performed by: INTERNAL MEDICINE

## 2023-01-29 PROCEDURE — 85025 COMPLETE CBC W/AUTO DIFF WBC: CPT | Performed by: INTERNAL MEDICINE

## 2023-01-29 PROCEDURE — 94799 UNLISTED PULMONARY SVC/PX: CPT

## 2023-01-29 PROCEDURE — 99291 CRITICAL CARE FIRST HOUR: CPT | Performed by: INTERNAL MEDICINE

## 2023-01-29 PROCEDURE — 97161 PT EVAL LOW COMPLEX 20 MIN: CPT

## 2023-01-29 PROCEDURE — 82962 GLUCOSE BLOOD TEST: CPT

## 2023-01-29 PROCEDURE — 25010000002 CEFAZOLIN IN DEXTROSE 2-4 GM/100ML-% SOLUTION: Performed by: SURGERY

## 2023-01-29 PROCEDURE — 97116 GAIT TRAINING THERAPY: CPT

## 2023-01-29 PROCEDURE — 85610 PROTHROMBIN TIME: CPT | Performed by: SURGERY

## 2023-01-29 RX ORDER — LABETALOL HYDROCHLORIDE 5 MG/ML
5 INJECTION, SOLUTION INTRAVENOUS
Status: DISCONTINUED | OUTPATIENT
Start: 2023-01-29 | End: 2023-02-01 | Stop reason: HOSPADM

## 2023-01-29 RX ORDER — LABETALOL HYDROCHLORIDE 5 MG/ML
10 INJECTION, SOLUTION INTRAVENOUS
Status: DISCONTINUED | OUTPATIENT
Start: 2023-01-29 | End: 2023-01-29

## 2023-01-29 RX ADMIN — Medication 10 ML: at 09:21

## 2023-01-29 RX ADMIN — ITRACONAZOLE 200 MG: 100 CAPSULE, COATED PELLETS ORAL at 19:05

## 2023-01-29 RX ADMIN — SODIUM CHLORIDE, POTASSIUM CHLORIDE, SODIUM LACTATE AND CALCIUM CHLORIDE 125 ML/HR: 600; 310; 30; 20 INJECTION, SOLUTION INTRAVENOUS at 10:31

## 2023-01-29 RX ADMIN — ITRACONAZOLE 200 MG: 100 CAPSULE, COATED PELLETS ORAL at 09:21

## 2023-01-29 RX ADMIN — LISINOPRIL 20 MG: 20 TABLET ORAL at 20:18

## 2023-01-29 RX ADMIN — LABETALOL HYDROCHLORIDE 5 MG: 5 INJECTION, SOLUTION INTRAVENOUS at 12:28

## 2023-01-29 RX ADMIN — Medication 10 ML: at 09:22

## 2023-01-29 RX ADMIN — ACETAMINOPHEN 650 MG: 325 TABLET ORAL at 20:27

## 2023-01-29 RX ADMIN — Medication 10 ML: at 22:30

## 2023-01-29 RX ADMIN — INSULIN HUMAN 1.1 UNITS/HR: 1 INJECTION, SOLUTION INTRAVENOUS at 18:49

## 2023-01-29 RX ADMIN — CEFAZOLIN SODIUM 2 G: 2 INJECTION, SOLUTION INTRAVENOUS at 12:28

## 2023-01-29 RX ADMIN — Medication: at 08:23

## 2023-01-29 RX ADMIN — ACETAMINOPHEN 650 MG: 325 TABLET ORAL at 13:21

## 2023-01-29 RX ADMIN — LISINOPRIL 20 MG: 20 TABLET ORAL at 09:21

## 2023-01-29 RX ADMIN — ACETAMINOPHEN 650 MG: 325 TABLET ORAL at 06:20

## 2023-01-29 RX ADMIN — DEXTROSE MONOHYDRATE 14 ML: 25 INJECTION, SOLUTION INTRAVENOUS at 23:35

## 2023-01-29 RX ADMIN — CEFAZOLIN SODIUM 2 G: 2 INJECTION, SOLUTION INTRAVENOUS at 04:03

## 2023-01-29 RX ADMIN — DEXTROSE MONOHYDRATE 15 ML: 25 INJECTION, SOLUTION INTRAVENOUS at 22:18

## 2023-01-29 RX ADMIN — Medication 10 ML: at 22:22

## 2023-01-29 RX ADMIN — ROSUVASTATIN 40 MG: 20 TABLET, FILM COATED ORAL at 20:18

## 2023-01-29 RX ADMIN — SODIUM CHLORIDE, POTASSIUM CHLORIDE, SODIUM LACTATE AND CALCIUM CHLORIDE 125 ML/HR: 600; 310; 30; 20 INJECTION, SOLUTION INTRAVENOUS at 19:47

## 2023-01-29 RX ADMIN — ASPIRIN 81 MG: 81 TABLET, COATED ORAL at 09:21

## 2023-01-30 LAB
ALBUMIN SERPL-MCNC: 3.5 G/DL (ref 3.5–5.2)
ALBUMIN/GLOB SERPL: 1.3 G/DL
ALP SERPL-CCNC: 97 U/L (ref 39–117)
ALT SERPL W P-5'-P-CCNC: 8 U/L (ref 1–41)
ANION GAP SERPL CALCULATED.3IONS-SCNC: 7.1 MMOL/L (ref 5–15)
AST SERPL-CCNC: 18 U/L (ref 1–40)
BASOPHILS # BLD AUTO: 0.07 10*3/MM3 (ref 0–0.2)
BASOPHILS NFR BLD AUTO: 0.9 % (ref 0–1.5)
BILIRUB SERPL-MCNC: 0.5 MG/DL (ref 0–1.2)
BUN SERPL-MCNC: 7 MG/DL (ref 8–23)
BUN/CREAT SERPL: 12.7 (ref 7–25)
CALCIUM SPEC-SCNC: 8.7 MG/DL (ref 8.6–10.5)
CHLORIDE SERPL-SCNC: 101 MMOL/L (ref 98–107)
CO2 SERPL-SCNC: 33.9 MMOL/L (ref 22–29)
CREAT SERPL-MCNC: 0.55 MG/DL (ref 0.76–1.27)
DEPRECATED RDW RBC AUTO: 46.2 FL (ref 37–54)
EGFRCR SERPLBLD CKD-EPI 2021: 111.4 ML/MIN/1.73
EOSINOPHIL # BLD AUTO: 0.44 10*3/MM3 (ref 0–0.4)
EOSINOPHIL NFR BLD AUTO: 5.6 % (ref 0.3–6.2)
ERYTHROCYTE [DISTWIDTH] IN BLOOD BY AUTOMATED COUNT: 13.9 % (ref 12.3–15.4)
GLOBULIN UR ELPH-MCNC: 2.7 GM/DL
GLUCOSE BLDC GLUCOMTR-MCNC: 141 MG/DL (ref 70–99)
GLUCOSE BLDC GLUCOMTR-MCNC: 150 MG/DL (ref 70–99)
GLUCOSE BLDC GLUCOMTR-MCNC: 198 MG/DL (ref 70–99)
GLUCOSE BLDC GLUCOMTR-MCNC: 222 MG/DL (ref 70–99)
GLUCOSE BLDC GLUCOMTR-MCNC: 229 MG/DL (ref 70–99)
GLUCOSE BLDC GLUCOMTR-MCNC: 231 MG/DL (ref 70–99)
GLUCOSE BLDC GLUCOMTR-MCNC: 233 MG/DL (ref 70–99)
GLUCOSE BLDC GLUCOMTR-MCNC: 237 MG/DL (ref 70–99)
GLUCOSE BLDC GLUCOMTR-MCNC: 250 MG/DL (ref 70–99)
GLUCOSE BLDC GLUCOMTR-MCNC: 271 MG/DL (ref 70–99)
GLUCOSE SERPL-MCNC: 251 MG/DL (ref 65–99)
HCT VFR BLD AUTO: 34.7 % (ref 37.5–51)
HGB BLD-MCNC: 11.3 G/DL (ref 13–17.7)
IMM GRANULOCYTES # BLD AUTO: 0.03 10*3/MM3 (ref 0–0.05)
IMM GRANULOCYTES NFR BLD AUTO: 0.4 % (ref 0–0.5)
INR PPP: 1.09 (ref 0.86–1.15)
LYMPHOCYTES # BLD AUTO: 1.45 10*3/MM3 (ref 0.7–3.1)
LYMPHOCYTES NFR BLD AUTO: 18.4 % (ref 19.6–45.3)
MAGNESIUM SERPL-MCNC: 1.5 MG/DL (ref 1.6–2.4)
MCH RBC QN AUTO: 29.6 PG (ref 26.6–33)
MCHC RBC AUTO-ENTMCNC: 32.6 G/DL (ref 31.5–35.7)
MCV RBC AUTO: 90.8 FL (ref 79–97)
MONOCYTES # BLD AUTO: 0.67 10*3/MM3 (ref 0.1–0.9)
MONOCYTES NFR BLD AUTO: 8.5 % (ref 5–12)
NEUTROPHILS NFR BLD AUTO: 5.22 10*3/MM3 (ref 1.7–7)
NEUTROPHILS NFR BLD AUTO: 66.2 % (ref 42.7–76)
NRBC BLD AUTO-RTO: 0 /100 WBC (ref 0–0.2)
NT-PROBNP SERPL-MCNC: 739.9 PG/ML (ref 0–900)
PHOSPHATE SERPL-MCNC: 2.4 MG/DL (ref 2.5–4.5)
PLATELET # BLD AUTO: 188 10*3/MM3 (ref 140–450)
PMV BLD AUTO: 9.5 FL (ref 6–12)
POTASSIUM SERPL-SCNC: 3.7 MMOL/L (ref 3.5–5.2)
PROT SERPL-MCNC: 6.2 G/DL (ref 6–8.5)
PROTHROMBIN TIME: 14.2 SECONDS (ref 11.8–14.9)
RBC # BLD AUTO: 3.82 10*6/MM3 (ref 4.14–5.8)
SODIUM SERPL-SCNC: 142 MMOL/L (ref 136–145)
WBC NRBC COR # BLD: 7.88 10*3/MM3 (ref 3.4–10.8)

## 2023-01-30 PROCEDURE — 94760 N-INVAS EAR/PLS OXIMETRY 1: CPT

## 2023-01-30 PROCEDURE — 83880 ASSAY OF NATRIURETIC PEPTIDE: CPT | Performed by: INTERNAL MEDICINE

## 2023-01-30 PROCEDURE — 63710000001 REVEFENACIN 175 MCG/3ML SOLUTION: Performed by: INTERNAL MEDICINE

## 2023-01-30 PROCEDURE — 94799 UNLISTED PULMONARY SVC/PX: CPT

## 2023-01-30 PROCEDURE — 85025 COMPLETE CBC W/AUTO DIFF WBC: CPT | Performed by: INTERNAL MEDICINE

## 2023-01-30 PROCEDURE — 80053 COMPREHEN METABOLIC PANEL: CPT | Performed by: INTERNAL MEDICINE

## 2023-01-30 PROCEDURE — 97110 THERAPEUTIC EXERCISES: CPT

## 2023-01-30 PROCEDURE — 25010000002 MAGNESIUM SULFATE IN D5W 1G/100ML (PREMIX) 1-5 GM/100ML-% SOLUTION: Performed by: INTERNAL MEDICINE

## 2023-01-30 PROCEDURE — 99291 CRITICAL CARE FIRST HOUR: CPT | Performed by: INTERNAL MEDICINE

## 2023-01-30 PROCEDURE — 94640 AIRWAY INHALATION TREATMENT: CPT

## 2023-01-30 PROCEDURE — 85610 PROTHROMBIN TIME: CPT | Performed by: SURGERY

## 2023-01-30 PROCEDURE — 63710000001 INSULIN LISPRO (HUMAN) PER 5 UNITS: Performed by: INTERNAL MEDICINE

## 2023-01-30 PROCEDURE — 97165 OT EVAL LOW COMPLEX 30 MIN: CPT

## 2023-01-30 PROCEDURE — 83735 ASSAY OF MAGNESIUM: CPT | Performed by: INTERNAL MEDICINE

## 2023-01-30 PROCEDURE — 82962 GLUCOSE BLOOD TEST: CPT

## 2023-01-30 PROCEDURE — 92610 EVALUATE SWALLOWING FUNCTION: CPT

## 2023-01-30 PROCEDURE — 63710000001 INSULIN DETEMIR PER 5 UNITS: Performed by: INTERNAL MEDICINE

## 2023-01-30 PROCEDURE — 84100 ASSAY OF PHOSPHORUS: CPT | Performed by: INTERNAL MEDICINE

## 2023-01-30 PROCEDURE — 97116 GAIT TRAINING THERAPY: CPT

## 2023-01-30 PROCEDURE — 94761 N-INVAS EAR/PLS OXIMETRY MLT: CPT

## 2023-01-30 RX ORDER — INSULIN LISPRO 100 [IU]/ML
2-7 INJECTION, SOLUTION INTRAVENOUS; SUBCUTANEOUS
Status: DISCONTINUED | OUTPATIENT
Start: 2023-01-30 | End: 2023-01-31

## 2023-01-30 RX ORDER — FENTANYL/ROPIVACAINE/NS/PF 2-625MCG/1
30 PLASTIC BAG, INJECTION (ML) EPIDURAL ONCE
Status: DISCONTINUED | OUTPATIENT
Start: 2023-01-30 | End: 2023-01-30

## 2023-01-30 RX ORDER — FENTANYL/ROPIVACAINE/NS/PF 2-625MCG/1
15 PLASTIC BAG, INJECTION (ML) EPIDURAL
Status: COMPLETED | OUTPATIENT
Start: 2023-01-30 | End: 2023-01-30

## 2023-01-30 RX ORDER — BUDESONIDE 0.5 MG/2ML
0.5 INHALANT ORAL
Status: DISCONTINUED | OUTPATIENT
Start: 2023-01-30 | End: 2023-02-01 | Stop reason: HOSPADM

## 2023-01-30 RX ORDER — DEXTROSE MONOHYDRATE 25 G/50ML
25 INJECTION, SOLUTION INTRAVENOUS
Status: DISCONTINUED | OUTPATIENT
Start: 2023-01-30 | End: 2023-01-31

## 2023-01-30 RX ORDER — AMLODIPINE BESYLATE 5 MG/1
5 TABLET ORAL
Status: DISCONTINUED | OUTPATIENT
Start: 2023-01-30 | End: 2023-01-31

## 2023-01-30 RX ORDER — DEXTROSE, SODIUM CHLORIDE, SODIUM LACTATE, POTASSIUM CHLORIDE, AND CALCIUM CHLORIDE 5; .6; .31; .03; .02 G/100ML; G/100ML; G/100ML; G/100ML; G/100ML
125 INJECTION, SOLUTION INTRAVENOUS CONTINUOUS
Status: DISCONTINUED | OUTPATIENT
Start: 2023-01-30 | End: 2023-01-30

## 2023-01-30 RX ORDER — ARFORMOTEROL TARTRATE 15 UG/2ML
15 SOLUTION RESPIRATORY (INHALATION)
Status: DISCONTINUED | OUTPATIENT
Start: 2023-01-30 | End: 2023-02-01 | Stop reason: HOSPADM

## 2023-01-30 RX ORDER — NICOTINE POLACRILEX 4 MG
15 LOZENGE BUCCAL
Status: DISCONTINUED | OUTPATIENT
Start: 2023-01-30 | End: 2023-01-31

## 2023-01-30 RX ORDER — MAGNESIUM SULFATE 1 G/100ML
2 INJECTION INTRAVENOUS
Status: COMPLETED | OUTPATIENT
Start: 2023-01-30 | End: 2023-01-30

## 2023-01-30 RX ADMIN — BUDESONIDE 0.5 MG: 0.5 INHALANT ORAL at 07:31

## 2023-01-30 RX ADMIN — LISINOPRIL 20 MG: 20 TABLET ORAL at 20:18

## 2023-01-30 RX ADMIN — Medication 10 ML: at 20:18

## 2023-01-30 RX ADMIN — LABETALOL HYDROCHLORIDE 5 MG: 5 INJECTION, SOLUTION INTRAVENOUS at 00:13

## 2023-01-30 RX ADMIN — MAGNESIUM SULFATE 2 G: 1 INJECTION INTRAVENOUS at 06:32

## 2023-01-30 RX ADMIN — POTASSIUM PHOSPHATE, MONOBASIC AND POTASSIUM PHOSPHATE, DIBASIC 15 MMOL: 224; 236 INJECTION, SOLUTION, CONCENTRATE INTRAVENOUS at 07:50

## 2023-01-30 RX ADMIN — ARFORMOTEROL TARTRATE 15 MCG: 15 SOLUTION RESPIRATORY (INHALATION) at 19:34

## 2023-01-30 RX ADMIN — Medication 10 ML: at 08:20

## 2023-01-30 RX ADMIN — LABETALOL HYDROCHLORIDE 5 MG: 5 INJECTION, SOLUTION INTRAVENOUS at 21:22

## 2023-01-30 RX ADMIN — REVEFENACIN 175 MCG: 175 SOLUTION RESPIRATORY (INHALATION) at 11:32

## 2023-01-30 RX ADMIN — LISINOPRIL 20 MG: 20 TABLET ORAL at 08:18

## 2023-01-30 RX ADMIN — ARFORMOTEROL TARTRATE 15 MCG: 15 SOLUTION RESPIRATORY (INHALATION) at 07:31

## 2023-01-30 RX ADMIN — BUDESONIDE 0.5 MG: 0.5 INHALANT ORAL at 19:35

## 2023-01-30 RX ADMIN — Medication 10 ML: at 20:19

## 2023-01-30 RX ADMIN — ITRACONAZOLE 200 MG: 100 CAPSULE, COATED PELLETS ORAL at 18:15

## 2023-01-30 RX ADMIN — Medication: at 08:20

## 2023-01-30 RX ADMIN — INSULIN LISPRO 3 UNITS: 100 INJECTION, SOLUTION INTRAVENOUS; SUBCUTANEOUS at 11:51

## 2023-01-30 RX ADMIN — INSULIN LISPRO 3 UNITS: 100 INJECTION, SOLUTION INTRAVENOUS; SUBCUTANEOUS at 18:14

## 2023-01-30 RX ADMIN — INSULIN DETEMIR 8 UNITS: 100 INJECTION, SOLUTION SUBCUTANEOUS at 20:24

## 2023-01-30 RX ADMIN — SODIUM CHLORIDE, SODIUM LACTATE, POTASSIUM CHLORIDE, CALCIUM CHLORIDE AND DEXTROSE MONOHYDRATE 125 ML/HR: 5; 600; 310; 30; 20 INJECTION, SOLUTION INTRAVENOUS at 02:51

## 2023-01-30 RX ADMIN — POTASSIUM PHOSPHATE, MONOBASIC AND POTASSIUM PHOSPHATE, DIBASIC 15 MMOL: 224; 236 INJECTION, SOLUTION, CONCENTRATE INTRAVENOUS at 09:16

## 2023-01-30 RX ADMIN — AMLODIPINE BESYLATE 5 MG: 5 TABLET ORAL at 08:18

## 2023-01-30 RX ADMIN — ROSUVASTATIN 40 MG: 20 TABLET, FILM COATED ORAL at 20:18

## 2023-01-30 RX ADMIN — LABETALOL HYDROCHLORIDE 5 MG: 5 INJECTION, SOLUTION INTRAVENOUS at 17:15

## 2023-01-30 RX ADMIN — ASPIRIN 81 MG: 81 TABLET, COATED ORAL at 08:16

## 2023-01-30 RX ADMIN — ITRACONAZOLE 200 MG: 100 CAPSULE, COATED PELLETS ORAL at 08:16

## 2023-01-30 RX ADMIN — INSULIN DETEMIR 8 UNITS: 100 INJECTION, SOLUTION SUBCUTANEOUS at 08:16

## 2023-01-30 RX ADMIN — MAGNESIUM SULFATE 2 G: 1 INJECTION INTRAVENOUS at 06:24

## 2023-01-31 LAB
ALBUMIN SERPL-MCNC: 3.7 G/DL (ref 3.5–5.2)
ALBUMIN/GLOB SERPL: 1.3 G/DL
ALP SERPL-CCNC: 110 U/L (ref 39–117)
ALT SERPL W P-5'-P-CCNC: 9 U/L (ref 1–41)
ANION GAP SERPL CALCULATED.3IONS-SCNC: 5.6 MMOL/L (ref 5–15)
AST SERPL-CCNC: 19 U/L (ref 1–40)
BASOPHILS # BLD AUTO: 0.07 10*3/MM3 (ref 0–0.2)
BASOPHILS NFR BLD AUTO: 1.1 % (ref 0–1.5)
BILIRUB SERPL-MCNC: 0.4 MG/DL (ref 0–1.2)
BUN SERPL-MCNC: 11 MG/DL (ref 8–23)
BUN/CREAT SERPL: 18 (ref 7–25)
CALCIUM SPEC-SCNC: 8.9 MG/DL (ref 8.6–10.5)
CHLORIDE SERPL-SCNC: 100 MMOL/L (ref 98–107)
CO2 SERPL-SCNC: 33.4 MMOL/L (ref 22–29)
CREAT SERPL-MCNC: 0.61 MG/DL (ref 0.76–1.27)
CYTO UR: NORMAL
DEPRECATED RDW RBC AUTO: 43.9 FL (ref 37–54)
EGFRCR SERPLBLD CKD-EPI 2021: 107.9 ML/MIN/1.73
EOSINOPHIL # BLD AUTO: 0.58 10*3/MM3 (ref 0–0.4)
EOSINOPHIL NFR BLD AUTO: 8.8 % (ref 0.3–6.2)
ERYTHROCYTE [DISTWIDTH] IN BLOOD BY AUTOMATED COUNT: 13.5 % (ref 12.3–15.4)
GLOBULIN UR ELPH-MCNC: 2.8 GM/DL
GLUCOSE BLDC GLUCOMTR-MCNC: 125 MG/DL (ref 70–99)
GLUCOSE BLDC GLUCOMTR-MCNC: 225 MG/DL (ref 70–99)
GLUCOSE BLDC GLUCOMTR-MCNC: 326 MG/DL (ref 70–99)
GLUCOSE BLDC GLUCOMTR-MCNC: 63 MG/DL (ref 70–99)
GLUCOSE BLDC GLUCOMTR-MCNC: 79 MG/DL (ref 70–99)
GLUCOSE SERPL-MCNC: 271 MG/DL (ref 65–99)
HCT VFR BLD AUTO: 35.7 % (ref 37.5–51)
HGB BLD-MCNC: 11.8 G/DL (ref 13–17.7)
IMM GRANULOCYTES # BLD AUTO: 0.01 10*3/MM3 (ref 0–0.05)
IMM GRANULOCYTES NFR BLD AUTO: 0.2 % (ref 0–0.5)
INR PPP: 1.07 (ref 0.86–1.15)
LAB AP CASE REPORT: NORMAL
LAB AP CLINICAL INFORMATION: NORMAL
LYMPHOCYTES # BLD AUTO: 1.41 10*3/MM3 (ref 0.7–3.1)
LYMPHOCYTES NFR BLD AUTO: 21.3 % (ref 19.6–45.3)
MAGNESIUM SERPL-MCNC: 1.8 MG/DL (ref 1.6–2.4)
MCH RBC QN AUTO: 29.4 PG (ref 26.6–33)
MCHC RBC AUTO-ENTMCNC: 33.1 G/DL (ref 31.5–35.7)
MCV RBC AUTO: 88.8 FL (ref 79–97)
MONOCYTES # BLD AUTO: 0.63 10*3/MM3 (ref 0.1–0.9)
MONOCYTES NFR BLD AUTO: 9.5 % (ref 5–12)
NEUTROPHILS NFR BLD AUTO: 3.92 10*3/MM3 (ref 1.7–7)
NEUTROPHILS NFR BLD AUTO: 59.1 % (ref 42.7–76)
NRBC BLD AUTO-RTO: 0 /100 WBC (ref 0–0.2)
PATH REPORT.FINAL DX SPEC: NORMAL
PATH REPORT.GROSS SPEC: NORMAL
PHOSPHATE SERPL-MCNC: 2.9 MG/DL (ref 2.5–4.5)
PLATELET # BLD AUTO: 204 10*3/MM3 (ref 140–450)
PMV BLD AUTO: 9.5 FL (ref 6–12)
POTASSIUM SERPL-SCNC: 4 MMOL/L (ref 3.5–5.2)
PROT SERPL-MCNC: 6.5 G/DL (ref 6–8.5)
PROTHROMBIN TIME: 14 SECONDS (ref 11.8–14.9)
RBC # BLD AUTO: 4.02 10*6/MM3 (ref 4.14–5.8)
SODIUM SERPL-SCNC: 139 MMOL/L (ref 136–145)
WBC NRBC COR # BLD: 6.62 10*3/MM3 (ref 3.4–10.8)

## 2023-01-31 PROCEDURE — 94760 N-INVAS EAR/PLS OXIMETRY 1: CPT

## 2023-01-31 PROCEDURE — 63710000001 INSULIN LISPRO (HUMAN) PER 5 UNITS: Performed by: INTERNAL MEDICINE

## 2023-01-31 PROCEDURE — 63710000001 INSULIN DETEMIR PER 5 UNITS: Performed by: INTERNAL MEDICINE

## 2023-01-31 PROCEDURE — 83735 ASSAY OF MAGNESIUM: CPT | Performed by: INTERNAL MEDICINE

## 2023-01-31 PROCEDURE — 82962 GLUCOSE BLOOD TEST: CPT

## 2023-01-31 PROCEDURE — 99233 SBSQ HOSP IP/OBS HIGH 50: CPT | Performed by: INTERNAL MEDICINE

## 2023-01-31 PROCEDURE — 25010000002 MAGNESIUM SULFATE IN D5W 1G/100ML (PREMIX) 1-5 GM/100ML-% SOLUTION: Performed by: INTERNAL MEDICINE

## 2023-01-31 PROCEDURE — 97110 THERAPEUTIC EXERCISES: CPT

## 2023-01-31 PROCEDURE — 94664 DEMO&/EVAL PT USE INHALER: CPT

## 2023-01-31 PROCEDURE — 85025 COMPLETE CBC W/AUTO DIFF WBC: CPT | Performed by: INTERNAL MEDICINE

## 2023-01-31 PROCEDURE — 94799 UNLISTED PULMONARY SVC/PX: CPT

## 2023-01-31 PROCEDURE — 85610 PROTHROMBIN TIME: CPT | Performed by: SURGERY

## 2023-01-31 PROCEDURE — 63710000001 REVEFENACIN 175 MCG/3ML SOLUTION: Performed by: INTERNAL MEDICINE

## 2023-01-31 PROCEDURE — 80053 COMPREHEN METABOLIC PANEL: CPT | Performed by: INTERNAL MEDICINE

## 2023-01-31 PROCEDURE — 97116 GAIT TRAINING THERAPY: CPT

## 2023-01-31 PROCEDURE — 84100 ASSAY OF PHOSPHORUS: CPT | Performed by: INTERNAL MEDICINE

## 2023-01-31 RX ORDER — NICOTINE POLACRILEX 4 MG
15 LOZENGE BUCCAL
Status: DISCONTINUED | OUTPATIENT
Start: 2023-01-31 | End: 2023-02-01 | Stop reason: HOSPADM

## 2023-01-31 RX ORDER — AMLODIPINE BESYLATE 10 MG/1
10 TABLET ORAL
Status: DISCONTINUED | OUTPATIENT
Start: 2023-01-31 | End: 2023-02-01 | Stop reason: HOSPADM

## 2023-01-31 RX ORDER — MAGNESIUM SULFATE 1 G/100ML
2 INJECTION INTRAVENOUS
Status: COMPLETED | OUTPATIENT
Start: 2023-01-31 | End: 2023-01-31

## 2023-01-31 RX ORDER — DEXTROSE MONOHYDRATE 25 G/50ML
25 INJECTION, SOLUTION INTRAVENOUS
Status: DISCONTINUED | OUTPATIENT
Start: 2023-01-31 | End: 2023-02-01 | Stop reason: HOSPADM

## 2023-01-31 RX ORDER — HYDROCODONE BITARTRATE AND ACETAMINOPHEN 5; 325 MG/1; MG/1
1 TABLET ORAL EVERY 6 HOURS PRN
Status: DISCONTINUED | OUTPATIENT
Start: 2023-01-31 | End: 2023-02-01 | Stop reason: HOSPADM

## 2023-01-31 RX ADMIN — ASPIRIN 81 MG: 81 TABLET, COATED ORAL at 08:43

## 2023-01-31 RX ADMIN — INSULIN LISPRO 3 UNITS: 100 INJECTION, SOLUTION INTRAVENOUS; SUBCUTANEOUS at 08:43

## 2023-01-31 RX ADMIN — REVEFENACIN 175 MCG: 175 SOLUTION RESPIRATORY (INHALATION) at 07:24

## 2023-01-31 RX ADMIN — HYDROCODONE BITARTRATE AND ACETAMINOPHEN 1 TABLET: 5; 325 TABLET ORAL at 21:04

## 2023-01-31 RX ADMIN — LISINOPRIL 20 MG: 20 TABLET ORAL at 21:04

## 2023-01-31 RX ADMIN — Medication 10 ML: at 21:05

## 2023-01-31 RX ADMIN — ITRACONAZOLE 200 MG: 100 CAPSULE, COATED PELLETS ORAL at 18:06

## 2023-01-31 RX ADMIN — LABETALOL HYDROCHLORIDE 5 MG: 5 INJECTION, SOLUTION INTRAVENOUS at 06:16

## 2023-01-31 RX ADMIN — ITRACONAZOLE 200 MG: 100 CAPSULE, COATED PELLETS ORAL at 08:44

## 2023-01-31 RX ADMIN — LISINOPRIL 20 MG: 20 TABLET ORAL at 08:43

## 2023-01-31 RX ADMIN — INSULIN LISPRO 5 UNITS: 100 INJECTION, SOLUTION INTRAVENOUS; SUBCUTANEOUS at 12:19

## 2023-01-31 RX ADMIN — BUDESONIDE 0.5 MG: 0.5 INHALANT ORAL at 18:50

## 2023-01-31 RX ADMIN — ARFORMOTEROL TARTRATE 15 MCG: 15 SOLUTION RESPIRATORY (INHALATION) at 07:24

## 2023-01-31 RX ADMIN — MAGNESIUM SULFATE 2 G: 1 INJECTION INTRAVENOUS at 05:41

## 2023-01-31 RX ADMIN — ARFORMOTEROL TARTRATE 15 MCG: 15 SOLUTION RESPIRATORY (INHALATION) at 18:49

## 2023-01-31 RX ADMIN — AMLODIPINE BESYLATE 10 MG: 10 TABLET ORAL at 08:43

## 2023-01-31 RX ADMIN — INSULIN DETEMIR 8 UNITS: 100 INJECTION, SOLUTION SUBCUTANEOUS at 08:43

## 2023-01-31 RX ADMIN — Medication 10 ML: at 08:44

## 2023-01-31 RX ADMIN — ROSUVASTATIN 40 MG: 20 TABLET, FILM COATED ORAL at 21:04

## 2023-01-31 RX ADMIN — BUDESONIDE 0.5 MG: 0.5 INHALANT ORAL at 07:24

## 2023-01-31 RX ADMIN — MAGNESIUM SULFATE 2 G: 1 INJECTION INTRAVENOUS at 08:42

## 2023-02-01 ENCOUNTER — READMISSION MANAGEMENT (OUTPATIENT)
Dept: CALL CENTER | Facility: HOSPITAL | Age: 63
End: 2023-02-01
Payer: MEDICARE

## 2023-02-01 VITALS
DIASTOLIC BLOOD PRESSURE: 76 MMHG | SYSTOLIC BLOOD PRESSURE: 140 MMHG | OXYGEN SATURATION: 96 % | WEIGHT: 137.57 LBS | RESPIRATION RATE: 20 BRPM | HEIGHT: 71 IN | BODY MASS INDEX: 19.26 KG/M2 | HEART RATE: 89 BPM | TEMPERATURE: 97.8 F

## 2023-02-01 LAB
ALBUMIN SERPL-MCNC: 3.9 G/DL (ref 3.5–5.2)
ALBUMIN/GLOB SERPL: 1.3 G/DL
ALP SERPL-CCNC: 116 U/L (ref 39–117)
ALT SERPL W P-5'-P-CCNC: 13 U/L (ref 1–41)
ANION GAP SERPL CALCULATED.3IONS-SCNC: 7.2 MMOL/L (ref 5–15)
AST SERPL-CCNC: 23 U/L (ref 1–40)
BILIRUB SERPL-MCNC: 0.4 MG/DL (ref 0–1.2)
BUN SERPL-MCNC: 12 MG/DL (ref 8–23)
BUN/CREAT SERPL: 21.1 (ref 7–25)
CALCIUM SPEC-SCNC: 9.4 MG/DL (ref 8.6–10.5)
CHLORIDE SERPL-SCNC: 99 MMOL/L (ref 98–107)
CO2 SERPL-SCNC: 32.8 MMOL/L (ref 22–29)
CREAT SERPL-MCNC: 0.57 MG/DL (ref 0.76–1.27)
DEPRECATED RDW RBC AUTO: 44.2 FL (ref 37–54)
EGFRCR SERPLBLD CKD-EPI 2021: 110.2 ML/MIN/1.73
ERYTHROCYTE [DISTWIDTH] IN BLOOD BY AUTOMATED COUNT: 13.8 % (ref 12.3–15.4)
GLOBULIN UR ELPH-MCNC: 3 GM/DL
GLUCOSE BLDC GLUCOMTR-MCNC: 126 MG/DL (ref 70–99)
GLUCOSE BLDC GLUCOMTR-MCNC: 185 MG/DL (ref 70–99)
GLUCOSE BLDC GLUCOMTR-MCNC: 92 MG/DL (ref 70–99)
GLUCOSE SERPL-MCNC: 136 MG/DL (ref 65–99)
HCT VFR BLD AUTO: 37.9 % (ref 37.5–51)
HGB BLD-MCNC: 12.7 G/DL (ref 13–17.7)
HOLD SPECIMEN: NORMAL
INR PPP: 1.02 (ref 0.86–1.15)
MAGNESIUM SERPL-MCNC: 1.9 MG/DL (ref 1.6–2.4)
MCH RBC QN AUTO: 29.6 PG (ref 26.6–33)
MCHC RBC AUTO-ENTMCNC: 33.5 G/DL (ref 31.5–35.7)
MCV RBC AUTO: 88.3 FL (ref 79–97)
PHOSPHATE SERPL-MCNC: 3.3 MG/DL (ref 2.5–4.5)
PLATELET # BLD AUTO: 247 10*3/MM3 (ref 140–450)
PMV BLD AUTO: 10.1 FL (ref 6–12)
POTASSIUM SERPL-SCNC: 4 MMOL/L (ref 3.5–5.2)
PROT SERPL-MCNC: 6.9 G/DL (ref 6–8.5)
PROTHROMBIN TIME: 13.5 SECONDS (ref 11.8–14.9)
RBC # BLD AUTO: 4.29 10*6/MM3 (ref 4.14–5.8)
SODIUM SERPL-SCNC: 139 MMOL/L (ref 136–145)
WBC NRBC COR # BLD: 6.3 10*3/MM3 (ref 3.4–10.8)
WHOLE BLOOD HOLD SPECIMEN: NORMAL

## 2023-02-01 PROCEDURE — 82962 GLUCOSE BLOOD TEST: CPT

## 2023-02-01 PROCEDURE — 94799 UNLISTED PULMONARY SVC/PX: CPT

## 2023-02-01 PROCEDURE — 84100 ASSAY OF PHOSPHORUS: CPT | Performed by: PHYSICIAN ASSISTANT

## 2023-02-01 PROCEDURE — 85027 COMPLETE CBC AUTOMATED: CPT | Performed by: PHYSICIAN ASSISTANT

## 2023-02-01 PROCEDURE — 63710000001 REVEFENACIN 175 MCG/3ML SOLUTION: Performed by: INTERNAL MEDICINE

## 2023-02-01 PROCEDURE — 85610 PROTHROMBIN TIME: CPT | Performed by: SURGERY

## 2023-02-01 PROCEDURE — 83735 ASSAY OF MAGNESIUM: CPT | Performed by: PHYSICIAN ASSISTANT

## 2023-02-01 PROCEDURE — 99232 SBSQ HOSP IP/OBS MODERATE 35: CPT | Performed by: INTERNAL MEDICINE

## 2023-02-01 PROCEDURE — 99024 POSTOP FOLLOW-UP VISIT: CPT | Performed by: SURGERY

## 2023-02-01 PROCEDURE — 80053 COMPREHEN METABOLIC PANEL: CPT | Performed by: PHYSICIAN ASSISTANT

## 2023-02-01 RX ORDER — OXYCODONE HYDROCHLORIDE AND ACETAMINOPHEN 5; 325 MG/1; MG/1
1 TABLET ORAL EVERY 6 HOURS PRN
Qty: 20 TABLET | Refills: 0 | Status: SHIPPED | OUTPATIENT
Start: 2023-02-01

## 2023-02-01 RX ORDER — AMLODIPINE BESYLATE 10 MG/1
10 TABLET ORAL
Qty: 30 TABLET | Refills: 5 | Status: SHIPPED | OUTPATIENT
Start: 2023-02-02 | End: 2023-02-09

## 2023-02-01 RX ADMIN — ASPIRIN 81 MG: 81 TABLET, COATED ORAL at 09:41

## 2023-02-01 RX ADMIN — AMLODIPINE BESYLATE 10 MG: 10 TABLET ORAL at 09:41

## 2023-02-01 RX ADMIN — ARFORMOTEROL TARTRATE 15 MCG: 15 SOLUTION RESPIRATORY (INHALATION) at 09:50

## 2023-02-01 RX ADMIN — ITRACONAZOLE 200 MG: 100 CAPSULE, COATED PELLETS ORAL at 07:38

## 2023-02-01 RX ADMIN — Medication 10 ML: at 09:42

## 2023-02-01 RX ADMIN — LISINOPRIL 20 MG: 20 TABLET ORAL at 09:41

## 2023-02-01 RX ADMIN — BUDESONIDE 0.5 MG: 0.5 INHALANT ORAL at 09:50

## 2023-02-01 RX ADMIN — REVEFENACIN 175 MCG: 175 SOLUTION RESPIRATORY (INHALATION) at 09:50

## 2023-02-01 RX ADMIN — ITRACONAZOLE 200 MG: 100 CAPSULE, COATED PELLETS ORAL at 18:21

## 2023-02-01 NOTE — PROGRESS NOTES
[facility]     Progress Note    Patient Name: Tyrone Garcia  : 1960  MRN: [id number]  Primary Care Physician:  Mana Stafford APRN  Date of admission: 2023    Subjective   Subjective     POD #5 status post right CEA complicated by multifocal strokes and conversion to a bleed    Doing well.  Ready to go home.    Objective   Objective     Vitals:   Temp:  [97.7 °F (36.5 °C)-98.3 °F (36.8 °C)] 98.3 °F (36.8 °C)  Heart Rate:  [74-99] 84  Resp:  [12-18] 18  BP: (127-166)/() 135/74  Flow (L/min):  [2] 2    Physical Exam   General: Alert, no acute distress.  Neck: Healing well, no signs of infection.  Extremities: Symmetric.  Neuro: Slight weakness left upper and lower extremities, 4+/5.        Assessment & Plan   Assessment / Plan     Assessment/Plan:    Stable status post right carotid endarterectomy complicated by multifocal infarcts with areas of hemorrhagic transformation.  Home.    Active Hospital Problems:  Active Hospital Problems    Diagnosis    • **Stenosis of right carotid artery    • Acute CVA (cerebrovascular accident) (HCC)    • Carotid artery stenosis            Electronically signed by Lweis Lynn MD, 23, 12:14 PM EST.

## 2023-02-01 NOTE — DISCHARGE INSTRUCTIONS
May shower.  Follow-up in the office in 2 weeks, call for appointment.  Resume home diet.  Resume home medications.  No lifting greater than 15 pounds for 2 weeks.

## 2023-02-01 NOTE — PROGRESS NOTES
[facility]     Progress Note    Patient Name: Tyrone Garcia  : 1960  MRN: [id number]  Primary Care Physician:  Mana Stafford APRN  Date of admission: 2023    Subjective   Subjective     Chief Complaint: Multiple strokes, status post carotid endarterectomy    Extubated on 2023    No acute events overnight  Orders for telemetry bed in place  NIH 0.not reporting any deficits at this time  Blood pressure 120- 140     Review of systems  Constitutional symptoms: Fatigue and weakness, otherwise denied complaints  Cardiovascular:  Denied complaints  Respiratory: Dyspnea, otherwise denied complaints  Gastrointestinal: Denied complaints  Neurologic: denied complaints      Objective   Objective     Vitals:   Temp:  [97.7 °F (36.5 °C)-98.1 °F (36.7 °C)] 97.9 °F (36.6 °C)  Heart Rate:  [67-99] 82  Resp:  [13-17] 15  BP: (127-173)/() 144/79  Flow (L/min):  [2-4] 2    Physical exam   Vital Signs Reviewed  General WDWN, Alert, NAD.    HEENT:  PERRL, EOMI.  OP, nares clear  Neck: Right carotid surgical site well dressed.  No obvious drainage, erythema or edema noted  CV: RRR, no MGR, pulses 2+, equal.  Lungs: Good aeration, trace rhonchi bilaterally, tympanic to percussion bilaterally, no work of breathing noted  Abd:  Soft, NT, ND, + BS, no HSM  EXT:  no clubbing, no cyanosis, no edema  Neuro:  A&Ox3, CN grossly intact, no focal deficits.  Left hand numbness/weakness noted, 4/5  Skin: No rashes or lesions noted      Result Review    Result Review:  I have personally reviewed the results from the time of this admission to 2023 06:33 EST and agree with these findings:  [x]  Laboratory list / accordion  [x]  Microbiology  [x]  Radiology  [x]  EKG/Telemetry   [x]  Cardiology/Vascular   [x]  Pathology  []  Old records  []  Other:  Most notable findings include:   MRI showing multiple strokes in the right frontoparietal region        Lab 23  0242 23  0240 23  0332  01/29/23  0844 01/28/23  0439 01/27/23  1849 01/27/23  1840 01/27/23  0938 01/27/23  0936   WBC 6.30 6.62 7.88 10.24 9.00  --   --  9.14  --    HEMOGLOBIN 12.7* 11.8* 11.3* 11.8* 10.5*  --   --  14.3  --    HEMATOCRIT 37.9 35.7* 34.7* 35.1* 30.7*  --   --  43.1  --    PLATELETS 247 204 188 206 187  --   --  313  --    SODIUM  --  139 142 140 138 137  --   --  144   SODIUM, ARTERIAL  --   --   --   --   --   --  135.4*  --   --    POTASSIUM  --  4.0 3.7 3.9 3.8 4.6  --   --  3.9   CHLORIDE  --  100 101 102 100 98  --   --  103   CO2  --  33.4* 33.9* 32.3* 31.1* 24.3  --   --  31.4*   BUN  --  11 7* 11 21 20  --   --  24*   CREATININE  --  0.61* 0.55* 0.60* 0.71* 0.76  --   --  0.84   GLUCOSE  --  271* 251* 151* 163* 343*  --   --  72   GLUCOSE, ARTERIAL  --   --   --   --   --   --  326*  --   --    CALCIUM  --  8.9 8.7 8.6 9.0 9.5  --   --  9.2   PHOSPHORUS  --  2.9 2.4*  --  2.6 3.1  --   --   --    TOTAL PROTEIN  --  6.5 6.2  --  6.4 6.8  --   --  8.0   ALBUMIN  --  3.7 3.5  --  3.7 4.0  --   --  4.4   GLOBULIN  --  2.8 2.7  --  2.7 2.8  --   --  3.6           Assessment & Plan   Assessment / Plan     Active Hospital Problems:  Active Hospital Problems    Diagnosis    • **Stenosis of right carotid artery    • Acute CVA (cerebrovascular accident) (HCC)    • Carotid artery stenosis    Multiple small ischemic infarct with hemorrhagic transformation right frontoparietal region  Hypertensive emergency  Type 1 diabetes with hyperglycemia, now hypoglycemic  COPD without exacerbation  Chronic hypoxic respiratory failure on 4 L of oxygen  Right upper lobe cavitary lesion, diagnosed histoplasmosis on itraconazole  Therapeutic drug monitoring of itraconazole  Hypokalemia  Hypomagnesemia  Hypophosphatemia    Plan:   On home oxygen of 2 to 4 L/min  On home insulin pump  Continue lisinopril and Norvasc.  Blood pressure under adequate control.  Would discharge on these agents and can follow-up with his primary care for further  adjustments  Continue nebulizers.  Discharge on home Breztri  Continue itraconazole for a total of 6 months.  Being managed by his pulmonologist and infectious disease specialist at the [facility].  I did review those notes in care everywhere  Diet as tolerated  Continue rehab services     Okay to transfer out of ICU    DVT prophylaxis:  Mechanical DVT prophylaxis orders are present.    CODE STATUS:    Code Status (Patient has no pulse and is not breathing): CPR (Attempt to Resuscitate)  Medical Interventions (Patient has pulse or is breathing): Full Support  Release to patient: Routine Release      Labs, imaging, microbiology, notes and medications personally reviewed  Discussed with primary     I, Dr. Patrick Sewell, have spent more than 50% of the total time managing the patient in this encounter today.  This included personally reviewing all pertinent labs, imaging, microbiology and documentation. Also discussing the case with the patient and any available family, the admitting physician and any available ancillary staff.    Electronically signed by Patrick Sewell MD, 02/01/23, 10:53 AM EST.

## 2023-02-01 NOTE — DISCHARGE SUMMARY
[facility]         DISCHARGE SUMMARY    Patient Name: Tyrone Garcia  : 1960  MRN: [id number]    Date of Admission: 2023  Date of Discharge:  2023  Primary Care Physician: Mana Stafford APRN    Consults     Date and Time Order Name Status Description    2023  6:37 PM Inpatient Neurology Consult Stroke      2023  7:19 PM Inpatient Neurology Consult Stroke      2023  6:10 PM Inpatient Intensivist Consult      2023  6:09 PM Inpatient Pulmonology Consult Completed     1/3/2023 12:34 AM Inpatient Vascular Surgery Consult Completed     2023  4:16 AM Inpatient Neurology Consult Stroke Completed           Presenting Problem:   Stenosis of right carotid artery [I65.21]  Carotid artery stenosis [I65.29]    Active and Resolved Hospital Problems:  Active Hospital Problems    Diagnosis POA   • **Stenosis of right carotid artery [I65.21] Yes   • Acute CVA (cerebrovascular accident) (HCC) [I63.9] Yes   • Carotid artery stenosis [I65.29] Yes      Resolved Hospital Problems   No resolved problems to display.         Hospital Course     Hospital Course:  Tyrone Garcia is a 62 y.o. male admitted for symptomatic right carotid endarterectomy.  He was taken to the operating room on 2023 where he underwent right carotid endarterectomy.  Postoperatively he developed multifocal areas of infarct in the right brain with some small areas of hemorrhagic conversion.  He was monitored in the ICU and has improved his neurologic examination and remained stable.  At this time ready to go home.            Day of Discharge     Vital Signs:  Temp:  [97.7 °F (36.5 °C)-98.3 °F (36.8 °C)] 98.3 °F (36.8 °C)  Heart Rate:  [74-99] 85  Resp:  [12-20] 20  BP: (127-171)/() 171/79  Flow (L/min):  [2] 2    Physical Exam:  General: Alert, no acute distress.  Neck: Healing well, no signs of infection.  Extremities: Symmetric.  Neuro: Slight weakness left upper and lower  extremities, 4+/5.    Pertinent  and/or Most Recent Results     LAB RESULTS:      Lab 02/01/23 0242 01/31/23 0240 01/30/23 0332 01/29/23 0844 01/29/23  0612 01/28/23  0439 01/27/23  1849 01/27/23  1840 01/27/23  0938 01/27/23  0936   0000   WBC 6.30 6.62 7.88 10.24  --  9.00  --   --  9.14  --    < >   HEMOGLOBIN 12.7* 11.8* 11.3* 11.8*  --  10.5*  --   --  14.3  --    < >   HEMATOCRIT 37.9 35.7* 34.7* 35.1*  --  30.7*  --   --  43.1  --    < >   PLATELETS 247 204 188 206  --  187  --   --  313  --    < >   NEUTROS ABS  --  3.92 5.22 6.95  --  7.49*  --   --  4.58  --   --    IMMATURE GRANS (ABS)  --  0.01 0.03 0.03  --  0.03  --   --  0.02  --   --    LYMPHS ABS  --  1.41 1.45 1.67  --  0.79  --   --  3.03  --   --    MONOS ABS  --  0.63 0.67 1.03*  --  0.65  --   --  0.99*  --   --    EOS ABS  --  0.58* 0.44* 0.48*  --  0.02  --   --  0.42*  --   --    MCV 88.3 88.8 90.8 90.0  --  87.7  --   --  89.8  --    < >   LACTATE, ARTERIAL  --   --   --   --   --   --   --  1.81  --   --   --    PROTIME 13.5 14.0 14.2  --  14.8 14.1   < >  --   --  13.6  --    APTT  --   --   --   --   --  32.8  --   --   --  28.9  --     < > = values in this interval not displayed.         Lab 02/01/23 0242 01/31/23 0240 01/30/23 0332 01/29/23 0844 01/28/23  0439 01/27/23  1849 01/27/23  1840   SODIUM 139 139 142 140 138 137  --    SODIUM, ARTERIAL  --   --   --   --   --   --  135.4*   POTASSIUM 4.0 4.0 3.7 3.9 3.8 4.6  --    CHLORIDE 99 100 101 102 100 98  --    CO2 32.8* 33.4* 33.9* 32.3* 31.1* 24.3  --    ANION GAP 7.2 5.6 7.1 5.7 6.9 14.7  --    BUN 12 11 7* 11 21 20  --    CREATININE 0.57* 0.61* 0.55* 0.60* 0.71* 0.76  --    EGFR 110.2 107.9 111.4 108.5 103.1 101.0  --    GLUCOSE 136* 271* 251* 151* 163* 343*  --    GLUCOSE, ARTERIAL  --   --   --   --   --   --  326*   CALCIUM 9.4 8.9 8.7 8.6 9.0 9.5  --    IONIZED CALCIUM  --   --   --   --   --   --  1.15   MAGNESIUM 1.9 1.8 1.5*  --  2.2 1.3*  --    PHOSPHORUS 3.3 2.9  2.4*  --  2.6 3.1  --    HEMOGLOBIN A1C  --   --   --   --  8.50*  --   --          Lab 02/01/23  0242 01/31/23  0240 01/30/23  0332 01/28/23  0439 01/27/23  1849   TOTAL PROTEIN 6.9 6.5 6.2 6.4 6.8   ALBUMIN 3.9 3.7 3.5 3.7 4.0   GLOBULIN 3.0 2.8 2.7 2.7 2.8   ALT (SGPT) 13 9 8 12 15   AST (SGOT) 23 19 18 16 16   BILIRUBIN 0.4 0.4 0.5 0.3 0.6   ALK PHOS 116 110 97 97 109         Lab 02/01/23  0242 01/31/23  0240 01/30/23  0332 01/29/23  0612 01/28/23  0439   PROBNP  --   --  739.9  --   --    PROTIME 13.5 14.0 14.2 14.8 14.1   INR 1.02 1.07 1.09 1.14 1.08         Lab 01/28/23  0439   CHOLESTEROL 90   LDL CHOL 28   HDL CHOL 51   TRIGLYCERIDES 35         Lab 01/27/23  1000 01/27/23  0936   ABO TYPING O O   RH TYPING Positive Positive   ANTIBODY SCREEN  --  Negative         Lab 01/27/23 1840   PH, ARTERIAL 7.307*   PCO2, ARTERIAL 48.9*   PO2 .8*   O2 SATURATION ART 98.1   FIO2 50   HCO3 ART 23.9   BASE EXCESS ART -2.7*   CARBOXYHEMOGLOBIN 0.3     Brief Urine Lab Results  (Last result in the past 365 days)      Color   Clarity   Blood   Leuk Est   Nitrite   Protein   CREAT   Urine HCG        01/27/23 1906             14.5         01/27/23 1906             16.1             Microbiology Results (last 10 days)     ** No results found for the last 240 hours. **          CT Angiogram Neck    Result Date: 1/27/2023  Impression:   CT angiogram of the neck and head demonstrating interval right carotid endarterectomy.  A surgical drain is in place.  Mural irregularity in the proximal right internal carotid artery may represent muscle spasm and/or postsurgical change.  A nonocclusive filling defect in the right internal carotid artery about 2.5 cm from the origin may represent postsurgical change, short segment dissection or adherent thrombus.  47 percent stenosis in the proximal left internal carotid artery is unchanged.  Findings at the lung apices remain worrisome for granulomatous disease or fungal infection.  The  differential diagnosis includes other entities as well.  I reviewed images and discussed findings with Dr. Spencer at 1745 and again at 1830 .     JACQUELINE DAVIS MD       Electronically Signed and Approved By: JACQUELINE DAVIS MD on 1/27/2023 at 18:36             CT Chest Without Contrast Diagnostic    Result Date: 1/2/2023  Impression:    1. New or increased bilateral pulmonary nodules are seen, as discussed.  These findings are noncalcified.  Many of the nodules exhibit cavitation.  The findings are age-indeterminate.  An age-indeterminate infectious/inflammatory respiratory process is possible.  For instance, age-indeterminate granulomatous disease would be in the differential diagnosis, including, but not necessarily limited to fungal pneumonia as well as active pulmonary tuberculosis (mycobacterial pneumonia).  Other types of atypical pulmonary infectious processes cannot be excluded.  Wegener granulomatosis (or granulomatosis with polyangiitis), pulmonary sarcoidosis is, and/or septic emboli cannot be excluded.  Pulmonary metastases cannot be excluded entirely but are thought to be less likely.  2. Moderate to severe emphysematous changes involve the lungs.  3. No pneumothorax or pneumomediastinum.  4. No pleural or pericardial effusion.  5. No definite filling defects are seen in the chronically dilated central airway.  6. Extensive atherosclerotic changes are noted, including involvement of the coronary arteries.  7. No cardiac enlargement.  8. Please see above comments for further detail.      Please note that portions of this note were completed with a voice recognition program.  RAMON VALIENTE JR, MD       Electronically Signed and Approved By: RAMON VALIENTE JR, MD on 1/02/2023 at 19:24              MRI Brain Without Contrast    Result Date: 1/28/2023  Impression:   1. Multiple small acute infarcts noted in the superior right frontal parietal region.  Some of these infarcts demonstrate evidence hemorrhagic  transformation. 2. Chronic lacunar infarct in the right internal capsule posterior limb.   I instructed the radiology  to call the report to the ordering provider.    Corbin Hart M.D.       Electronically Signed and Approved By: Corbin Hart M.D. on 1/28/2023 at 17:48             MRI Brain Without Contrast    Result Date: 1/2/2023  Impression:   MR examination of the brain without IV contrast demonstrating mild atrophy and white matter changes.  Gyriform subacute infarcts are seen in the high right frontal region, with bright signal on diffusion-weighted images and restricted diffusion.  1 cm focus of encephalomalacia in the posterior limb of the internal capsule on the right is consistent with an older infarct.  I discussed findings with Dr. fuentes at 1500 hours.      JACQUELINE DAVIS MD       Electronically Signed and Approved By: JACQUELINE DAVIS MD on 1/02/2023 at 14:58             XR Chest 1 View    Result Date: 1/27/2023  Impression:   ET tube in place.  The tip is 6.5 cm above the tomás.  The appearance of the lungs is unchanged.       JACQUELINE DAVIS MD       Electronically Signed and Approved By: JACQUELINE DAVIS MD on 1/27/2023 at 18:57             CT Angiogram Head    Result Date: 1/27/2023  Impression:   CT angiogram of the neck and head demonstrating interval right carotid endarterectomy.  A surgical drain is in place.  Mural irregularity in the proximal right internal carotid artery may represent muscle spasm and/or postsurgical change.  A nonocclusive filling defect in the right internal carotid artery about 2.5 cm from the origin may represent postsurgical change, short segment dissection or adherent thrombus.  47 percent stenosis in the proximal left internal carotid artery is unchanged.  Findings at the lung apices remain worrisome for granulomatous disease or fungal infection.  The differential diagnosis includes other entities as well.  I reviewed images and discussed findings with   Esan at 1745 and again at 1830 .     JACQUELINE DAVIS MD       Electronically Signed and Approved By: JACQUELINE DAVIS MD on 1/27/2023 at 18:36             CT Head Without Contrast Stroke Protocol    Result Date: 1/27/2023  Impression:   1. Apparent 0.7 cm low density focus in the medulla.  Acute infarction is not excluded.  This could be further evaluated on perfusion CT or brain MRI. 2. Other chronic appearing infarcts, as above 3. No intracranial hemorrhage    I discussed the findings with the ordering provider prior to dictation.    Corbin Hart M.D.       Electronically Signed and Approved By: Corbin Hart M.D. on 1/27/2023 at 17:27             XR Abdomen KUB    Result Date: 1/27/2023  Impression:   NG tube is in the stomach, the tip is 12 cm beyond the GE junction.     JACQUELINE DAVIS MD       Electronically Signed and Approved By: JACQUELINE DAVIS MD on 1/27/2023 at 20:28             CT CEREBRAL PERFUSION WITH & WITHOUT CONTRAST    Result Date: 1/27/2023  Impression:   CT cerebral perfusion study demonstrating no perfusion abnormality.      JACQUELINE DAVIS MD       Electronically Signed and Approved By: JACQUELINE DAVIS MD on 1/27/2023 at 18:09               Results for orders placed during the hospital encounter of 01/01/23    Duplex Carotid Ultrasound CAR    Interpretation Summary  •  There is a large dense right carotid bulb plaque with stenosis that is high-grade (over 60% through the bulb region) extending into the internal carotid artery.  •  Proximal right internal carotid artery severe stenosis.  (80 to 99%).  •  Proximal left internal carotid artery mild-moderate stenosis.  (60 to 79%).  •  Vertebral flow is antegrade bilaterally.      Results for orders placed during the hospital encounter of 01/01/23    Duplex Carotid Ultrasound CAR    Interpretation Summary  •  There is a large dense right carotid bulb plaque with stenosis that is high-grade (over 60% through the bulb region) extending into the internal  carotid artery.  •  Proximal right internal carotid artery severe stenosis.  (80 to 99%).  •  Proximal left internal carotid artery mild-moderate stenosis.  (60 to 79%).  •  Vertebral flow is antegrade bilaterally.      Results for orders placed during the hospital encounter of 01/01/23    Adult Transthoracic Echo Complete W/ Cont if Necessary Per Protocol    Interpretation Summary  •  Left ventricular diastolic function is consistent with (grade I) impaired relaxation.      Labs Pending at Discharge:  Pending Labs     Order Current Status    Itraconazole, Serum/Plasma In process            Discharge Details        Discharge Medications      New Medications      Instructions Start Date   amLODIPine 10 MG tablet  Commonly known as: NORVASC   10 mg, Oral, Every 24 Hours Scheduled   Start Date: February 2, 2023     oxyCODONE-acetaminophen 5-325 MG per tablet  Commonly known as: Percocet   1 tablet, Oral, Every 6 Hours PRN         Continue These Medications      Instructions Start Date   albuterol (2.5 MG/3ML) 0.083% nebulizer solution  Commonly known as: PROVENTIL   2.5 mg, Nebulization, Every 4 Hours PRN      aspirin 81 MG EC tablet   81 mg, Oral, Daily      benzonatate 200 MG capsule  Commonly known as: TESSALON   200 mg, Oral, 3 Times Daily PRN      Breztri Aerosphere 160-9-4.8 MCG/ACT aerosol inhaler  Generic drug: Budeson-Glycopyrrol-Formoterol   2 puffs, Inhalation, 2 Times Daily      ibuprofen 200 MG tablet  Commonly known as: ADVIL,MOTRIN   400 mg, Oral, Every 6 Hours PRN      Insulin Lispro 100 UNIT/ML injection  Commonly known as: humaLOG   75 Units, Subcutaneous, Daily, Type of Insulin: Humalog 100 unit/ml Basal Dose:  Bolus Dose:  Prescriber Jayne Montoya Phone number [phone] Next refill 1/3/22 Refilled every 3 days only 95 units left              irbesartan 150 MG tablet  Commonly known as: AVAPRO   150 mg, Oral, Daily      itraconazole 100 MG capsule  Commonly known as: Sporanox   Take 2 capsules by  mouth Every 8 (Eight) Hours for 3 days, THEN 2 capsules Daily for 27 days.   Start Date: January 7, 2023     lisinopril 20 MG tablet  Commonly known as: PRINIVIL,ZESTRIL   20 mg, Oral, 2 Times Daily      rosuvastatin 20 MG tablet  Commonly known as: Crestor   20 mg, Oral, Daily      traZODone 50 MG tablet  Commonly known as: DESYREL   50 mg, Oral, As Needed, Unsure of dose, pt spouse to call pharmacy to clarify             No Known Allergies      Discharge Disposition:  Home or Self Care    Diet:    Resume home diet.    Condition: Satisfactory.      Discharge Activity:     As tolerated.    CODE STATUS:  Code Status and Medical Interventions:   Ordered at: 01/27/23 1810     Code Status (Patient has no pulse and is not breathing):    CPR (Attempt to Resuscitate)     Medical Interventions (Patient has pulse or is breathing):    Full Support     Release to patient:    Routine Release         Future Appointments   Date Time Provider Department Center   2/9/2023 10:45 AM Rosales Kebede MD [facility] [place] [facility] [place] [facility]             Electronically signed by Lewis Lynn MD, 02/01/23, 12:20 PM EST.

## 2023-02-01 NOTE — PLAN OF CARE
Goal Outcome Evaluation:  Plan of Care Reviewed With: patient        Progress: improving  Outcome Evaluation: Patient met all goals, discharging home with new medications.  Vickie Sheriff RN

## 2023-02-02 LAB
ITRACONAZ SERPL-MCNC: 0.1 UG/ML
OH-ITRACONAZ SERPL-MCNC: 0.2 UG/ML

## 2023-02-02 NOTE — OUTREACH NOTE
Prep Survey    Flowsheet Row Responses   [facility]   Is LACE score < 7 ? No   Eligibility Readm Mgmt   Discharge diagnosis right carotid endarterectomy   Does the patient have one of the following disease processes/diagnoses(primary or secondary)? General Surgery   Does the patient have Home health ordered? No   Is there a DME ordered? No   Prep survey completed? Yes          GERALDINE BARBER - Registered Nurse

## 2023-02-03 ENCOUNTER — TRANSCRIBE ORDERS (OUTPATIENT)
Dept: VASCULAR SURGERY | Facility: HOSPITAL | Age: 63
End: 2023-02-03
Payer: MEDICARE

## 2023-02-03 DIAGNOSIS — Z98.890 S/P CAROTID ENDARTERECTOMY: ICD-10-CM

## 2023-02-03 DIAGNOSIS — I65.23 BILATERAL CAROTID ARTERY STENOSIS: Primary | ICD-10-CM

## 2023-02-07 ENCOUNTER — READMISSION MANAGEMENT (OUTPATIENT)
Dept: CALL CENTER | Facility: HOSPITAL | Age: 63
End: 2023-02-07
Payer: MEDICARE

## 2023-02-07 NOTE — OUTREACH NOTE
General Surgery Week 1 Survey    Flowsheet Row Responses   [facility] patient discharged from? [facility]   Does the patient have one of the following disease processes/diagnoses(primary or secondary)? General Surgery   Week 1 attempt successful? Yes   Call start time 1037   Call end time 1047   General alerts for this patient PATIENT AND SIGNIFICANT OTHER HAVE 4 FOSTER BOYS IN THEIR HOME   Discharge diagnosis right carotid endarterectomy   Is patient permission given to speak with other caregiver? Yes   List who call center can speak with JANET VALLADARES   Person spoke with today (if not patient) and relationship JANET VALLADARES- SO   Meds reviewed with patient/caregiver? Yes   Is the patient having any side effects they believe may be caused by any medication additions or changes? No   Does the patient have all medications related to this admission filled (includes all antibiotics, pain medications, etc.) Yes   Is the patient taking all medications as directed (includes completed medication regime)? Yes   Does the patient have a follow up appointment scheduled with their surgeon? Yes   Has the patient kept scheduled appointments due by today? Yes  [PATIENT IS AT HIS PCP OFFICE FOR AN APPOINTMENT AT THE TIME OF THIS CALL. ]   Has home health visited the patient within 72 hours of discharge? N/A   Psychosocial issues? No   Did the patient receive a copy of their discharge instructions? Yes   Nursing interventions Reviewed instructions with patient   What is the patient's perception of their health status since discharge? Improving   Nursing interventions Nurse provided patient education   Is the patient /caregiver able to teach back basic post-op care? Drive as instructed by MD in discharge instructions, Keep incision areas clean,dry and protected   Is the patient/caregiver able to teach back signs and symptoms of incisional infection? Increased redness, swelling or pain at the incisonal site, Increased drainage  or bleeding, Incisional warmth, Pus or odor from incision, Fever   Is the patient/caregiver able to teach back steps to recovery at home? Set small, achievable goals for return to baseline health, Rest and rebuild strength, gradually increase activity, Make a list of questions for surgeon's appointment   If the patient is a current smoker, are they able to teach back resources for cessation? Not a smoker   Is the patient/caregiver able to teach back the hierarchy of who to call/visit for symptoms/problems? PCP, Specialist, Home health nurse, Urgent Care, ED, 911 Yes   Week 1 call completed? Yes   Wrap up additional comments SIGNIFICANT OTHETR STATES HE IS HAVING WEAKNESS IN HIS RIGHT ARM AND HAND. SIGNIFICANT OTHER ALSO STATES THEY FEEL HE NEEDS AN AN ANTIDEPRESSANT. HE IS GOING TO DISCUSS THIS WITH HIS PCP TODAY.           Marce BARBER - Licensed Nurse

## 2023-02-09 ENCOUNTER — OFFICE VISIT (OUTPATIENT)
Dept: VASCULAR SURGERY | Facility: HOSPITAL | Age: 63
End: 2023-02-09
Payer: MEDICARE

## 2023-02-09 VITALS
HEART RATE: 85 BPM | OXYGEN SATURATION: 99 % | BODY MASS INDEX: 19.32 KG/M2 | WEIGHT: 138 LBS | SYSTOLIC BLOOD PRESSURE: 108 MMHG | RESPIRATION RATE: 18 BRPM | TEMPERATURE: 97.7 F | HEIGHT: 71 IN | DIASTOLIC BLOOD PRESSURE: 60 MMHG

## 2023-02-09 DIAGNOSIS — I65.23 BILATERAL CAROTID ARTERY STENOSIS: Primary | ICD-10-CM

## 2023-02-09 PROCEDURE — 99024 POSTOP FOLLOW-UP VISIT: CPT | Performed by: SURGERY

## 2023-02-09 PROCEDURE — G0463 HOSPITAL OUTPT CLINIC VISIT: HCPCS | Performed by: SURGERY

## 2023-02-09 RX ORDER — AMLODIPINE BESYLATE 10 MG/1
5 TABLET ORAL
Qty: 30 TABLET | Refills: 5 | Status: SHIPPED | OUTPATIENT
Start: 2023-02-09

## 2023-02-09 RX ORDER — ASPIRIN 81 MG/1
81 TABLET ORAL DAILY
Qty: 30 TABLET | Refills: 0 | Status: SHIPPED | OUTPATIENT
Start: 2023-02-09 | End: 2023-03-11

## 2023-02-09 NOTE — PROGRESS NOTES
[facility]   Follow up Office    Patient Name: Tyrone Garcia  : 1960  MRN: [id number]  Primary Care Physician:  Mana Stafford APRN      Subjective   Subjective     HPI:    Tyrone Garcia is a 62 y.o. male who comes for follow-up after right CEA about 2 weeks ago.  He had postprocedure stroke.  His CTA was done for symptomatic stenosis.  He is overall recovering well.  He still has deficit in his left hand.  He has not been getting any physical therapy.  His major complaint is bilateral lower extremity swelling.  He has been compliant with his medications.  He does not smoke.  He is on home oxygen for about a year now.      Objective     Vitals:   Temp:  [97.7 °F (36.5 °C)] 97.7 °F (36.5 °C)  Heart Rate:  [85] 85  Resp:  [18] 18  BP: (108)/(60) 108/60    Physical Exam      Patient is awake and alert.  Good  strength on the left hand.  Decreased fine motor function on the left hand.  Edema noted on both feet.  Palpable pedal pulses.  Right neck incision is healing well.  No hematoma.    Assessment & Plan   Assessment / Plan     Diagnoses and all orders for this visit:    1. Bilateral carotid artery stenosis (Primary)  -     Duplex Carotid Ultrasound CAR; Future  -     Ambulatory Referral to Physical Medicine Rehab    Other orders  -     amLODIPine (NORVASC) 10 MG tablet; Take 0.5 tablets by mouth Daily.  Dispense: 30 tablet; Refill: 5  -     aspirin 81 MG EC tablet; Take 1 tablet by mouth Daily for 30 days.  Dispense: 30 tablet; Refill: 0       Assessment/Plan:   62-year-old male status post right CEA for symptomatic stenosis complicated by postprocedure stroke and hemorrhagic conversion.  He is recovering well.  He needs aggressive outpatient physical therapy.  I explained to him that he has a very high chance of recovering most of his function and it may take up to a year.  He should continue on antiplatelet therapy and statin that he is on.  We will cut his amlodipine down to  half and that should help with edema.  I have also encouraged him to wear compression stockings.  He can get the incision wet as needed.  No restrictions on activity from our standpoint.  I also explained to him that numbness in the incision is very common after the surgery.  This will take some time and he should use an electric razor for shaving.  I do think he is going to do well in the long-term with aggressive therapy.  He would prefer to see me in follow-up.  I will see him back in office with a carotid duplex in a few months.        Electronically signed by Rosales Kebede MD, 02/09/23, 11:27 AM EST.

## 2023-02-15 ENCOUNTER — READMISSION MANAGEMENT (OUTPATIENT)
Dept: CALL CENTER | Facility: HOSPITAL | Age: 63
End: 2023-02-15
Payer: MEDICARE

## 2023-02-15 NOTE — OUTREACH NOTE
General Surgery Week 2 Survey    Flowsheet Row Responses   [facility] patient discharged from? [facility]   Does the patient have one of the following disease processes/diagnoses(primary or secondary)? General Surgery   Week 2 attempt successful? Yes   Call start time 1314   Call end time 1316   Discharge diagnosis right carotid endarterectomy   Meds reviewed with patient/caregiver? Yes   Is the patient having any side effects they believe may be caused by any medication additions or changes? No   Does the patient have all medications related to this admission filled (includes all antibiotics, pain medications, etc.) Yes   Is the patient taking all medications as directed (includes completed medication regime)? Yes   Does the patient have a follow up appointment scheduled with their surgeon? Yes   Has the patient kept scheduled appointments due by today? Yes   Has home health visited the patient within 72 hours of discharge? N/A   Psychosocial issues? No   Did the patient receive a copy of their discharge instructions? Yes   Nursing interventions Reviewed instructions with patient   What is the patient's perception of their health status since discharge? Improving   Nursing interventions Nurse provided patient education   Is the patient/caregiver able to teach back signs and symptoms of incisional infection? Increased redness, swelling or pain at the incisonal site, Fever   Is the patient/caregiver able to teach back steps to recovery at home? Set small, achievable goals for return to baseline health, Rest and rebuild strength, gradually increase activity   If the patient is a current smoker, are they able to teach back resources for cessation? Not a smoker   Week 2 call completed? Yes          Marce BARBER - Licensed Nurse

## 2023-05-30 ENCOUNTER — TRANSCRIBE ORDERS (OUTPATIENT)
Dept: ADMINISTRATIVE | Facility: HOSPITAL | Age: 63
End: 2023-05-30

## 2023-05-30 DIAGNOSIS — Z76.89 REFERRAL OF PATIENT: Primary | ICD-10-CM

## 2023-07-25 ENCOUNTER — OFFICE VISIT (OUTPATIENT)
Dept: VASCULAR SURGERY | Facility: HOSPITAL | Age: 63
End: 2023-07-25
Payer: MEDICARE

## 2023-07-25 ENCOUNTER — HOSPITAL ENCOUNTER (OUTPATIENT)
Dept: CARDIOLOGY | Facility: HOSPITAL | Age: 63
Discharge: HOME | End: 2023-07-25
Payer: MEDICARE

## 2023-07-25 VITALS
DIASTOLIC BLOOD PRESSURE: 91 MMHG | HEART RATE: 75 BPM | SYSTOLIC BLOOD PRESSURE: 176 MMHG | TEMPERATURE: 98.4 F | OXYGEN SATURATION: 95 % | RESPIRATION RATE: 18 BRPM

## 2023-07-25 DIAGNOSIS — I73.9 PAD (PERIPHERAL ARTERY DISEASE): ICD-10-CM

## 2023-07-25 DIAGNOSIS — I65.23 BILATERAL CAROTID ARTERY STENOSIS: ICD-10-CM

## 2023-07-25 DIAGNOSIS — I65.23 BILATERAL CAROTID ARTERY STENOSIS: Primary | ICD-10-CM

## 2023-07-25 LAB
BH CV XLRA MEAS CAROTID RIGHT ICA/CCA DIASTOLIC RATIO: 2.03
BH CV XLRA MEAS LEFT CAROTID BULB EDV: 37 CM/SEC
BH CV XLRA MEAS LEFT CAROTID BULB PSV: 247 CM/SEC
BH CV XLRA MEAS LEFT DIST CCA EDV: -13.7 CM/SEC
BH CV XLRA MEAS LEFT DIST CCA PSV: -64.6 CM/SEC
BH CV XLRA MEAS LEFT DIST ICA EDV: -22.7 CM/SEC
BH CV XLRA MEAS LEFT DIST ICA PSV: -78.4 CM/SEC
BH CV XLRA MEAS LEFT ICA/CCA DIASTOLIC RATIO: 1.61
BH CV XLRA MEAS LEFT ICA/CCA RATIO: 2.55
BH CV XLRA MEAS LEFT MID ICA EDV: -19.2 CM/SEC
BH CV XLRA MEAS LEFT MID ICA PSV: -85.1 CM/SEC
BH CV XLRA MEAS LEFT PROX CCA EDV: 11.8 CM/SEC
BH CV XLRA MEAS LEFT PROX CCA PSV: 73.9 CM/SEC
BH CV XLRA MEAS LEFT PROX ECA EDV: -25.2 CM/SEC
BH CV XLRA MEAS LEFT PROX ECA PSV: -164.6 CM/SEC
BH CV XLRA MEAS LEFT PROX ICA EDV: 22 CM/SEC
BH CV XLRA MEAS LEFT PROX ICA PSV: 165 CM/SEC
BH CV XLRA MEAS LEFT VERTEBRAL A EDV: -11.9 CM/SEC
BH CV XLRA MEAS LEFT VERTEBRAL A PSV: -51.1 CM/SEC
BH CV XLRA MEAS RIGHT CAROTID BULB EDV: 10 CM/SEC
BH CV XLRA MEAS RIGHT CAROTID BULB PSV: 114 CM/SEC
BH CV XLRA MEAS RIGHT DIST CCA EDV: 11.8 CM/SEC
BH CV XLRA MEAS RIGHT DIST CCA PSV: 93.2 CM/SEC
BH CV XLRA MEAS RIGHT DIST ICA EDV: -14.9 CM/SEC
BH CV XLRA MEAS RIGHT DIST ICA PSV: -59.6 CM/SEC
BH CV XLRA MEAS RIGHT ICA/CCA RATIO: 1.11
BH CV XLRA MEAS RIGHT MID ICA EDV: -15.7 CM/SEC
BH CV XLRA MEAS RIGHT MID ICA PSV: -69.8 CM/SEC
BH CV XLRA MEAS RIGHT PROX CCA EDV: 11.2 CM/SEC
BH CV XLRA MEAS RIGHT PROX CCA PSV: 76.4 CM/SEC
BH CV XLRA MEAS RIGHT PROX ECA EDV: 18.1 CM/SEC
BH CV XLRA MEAS RIGHT PROX ECA PSV: 181.3 CM/SEC
BH CV XLRA MEAS RIGHT PROX ICA EDV: 24 CM/SEC
BH CV XLRA MEAS RIGHT PROX ICA PSV: 103 CM/SEC
BH CV XLRA MEAS RIGHT VERTEBRAL A EDV: -11 CM/SEC
BH CV XLRA MEAS RIGHT VERTEBRAL A PSV: -47.8 CM/SEC
LEFT ARM BP: NORMAL MMHG
RIGHT ARM BP: NORMAL MMHG

## 2023-07-25 PROCEDURE — 93880 EXTRACRANIAL BILAT STUDY: CPT

## 2023-07-25 RX ORDER — ASPIRIN 81 MG/1
81 TABLET, CHEWABLE ORAL DAILY
COMMUNITY

## 2023-07-25 NOTE — PROGRESS NOTES
[facility]   Follow up Office    Patient Name: Tyrone Garcia  : 1960  MRN: [id number]  Primary Care Physician:  Mana Stafford APRN      Subjective   Subjective     HPI:    Tyrone Garcia is a 63 y.o. male who comes for follow-up after right carotid endarterectomy for symptomatic stenosis.  He had a postoperative hemorrhagic conversion of his stroke.  He has been recovering well.  He is on oxygen.  His edema has improved.  Overall his thinks that he is at baseline from his neurological deficit standpoint.  No other new complaints.  He does have claudication symptoms in both calves.  This is not limiting.  No rest pain.      Objective     Vitals:   Temp:  [98.4 °F (36.9 °C)] 98.4 °F (36.9 °C)  Heart Rate:  [75] 75  Resp:  [18] 18  BP: (176-189)/(89-91) 176/91    Physical Exam      Patient is awake and alert.  On oxygen at rest.  Incisions well-healed.  No gross neurological deficit.    Assessment & Plan   Assessment / Plan     Diagnoses and all orders for this visit:    1. Bilateral carotid artery stenosis (Primary)    2. PAD (peripheral artery disease)       Assessment/Plan:   Duplex shows a patent right carotid endarterectomy.  Left side has a peak systolic velocity of about 250 but the end-diastolic velocity is low.  This is consistent with his prior CT imaging of approximately 50 to 60% stenosis on the left.  Right side is patent.  We will continue surveillance on this.  Follow-up in 1 year with a carotid duplex.  He does have some claudication symptoms although is not limiting.  We will get a baseline DEBBIE also at the next visit.  Continue current medication regimen.  He understands agrees the plan.  He is not a smoker.        Electronically signed by Rosales Kebede MD, 23, 11:01 AM EDT.

## 2024-03-12 ENCOUNTER — HOSPITAL ENCOUNTER (EMERGENCY)
Facility: HOSPITAL | Age: 64
Discharge: HOME | End: 2024-03-12
Attending: EMERGENCY MEDICINE | Admitting: EMERGENCY MEDICINE
Payer: MEDICARE

## 2024-03-12 VITALS
RESPIRATION RATE: 16 BRPM | DIASTOLIC BLOOD PRESSURE: 91 MMHG | BODY MASS INDEX: 19.38 KG/M2 | OXYGEN SATURATION: 100 % | HEIGHT: 71 IN | TEMPERATURE: 97.4 F | SYSTOLIC BLOOD PRESSURE: 171 MMHG | HEART RATE: 80 BPM | WEIGHT: 138.45 LBS

## 2024-03-12 DIAGNOSIS — E11.649 DIABETIC HYPOGLYCEMIA: Primary | ICD-10-CM

## 2024-03-12 LAB
GLUCOSE BLDC GLUCOMTR-MCNC: 111 MG/DL (ref 70–99)
GLUCOSE BLDC GLUCOMTR-MCNC: 31 MG/DL (ref 70–99)

## 2024-03-12 PROCEDURE — 82948 REAGENT STRIP/BLOOD GLUCOSE: CPT

## 2024-03-12 PROCEDURE — 96374 THER/PROPH/DIAG INJ IV PUSH: CPT

## 2024-03-12 PROCEDURE — 99283 EMERGENCY DEPT VISIT LOW MDM: CPT

## 2024-03-12 RX ORDER — DEXTROSE MONOHYDRATE 25 G/50ML
25 INJECTION, SOLUTION INTRAVENOUS ONCE
Status: COMPLETED | OUTPATIENT
Start: 2024-03-12 | End: 2024-03-12

## 2024-03-12 RX ADMIN — DEXTROSE MONOHYDRATE 25 G: 25 INJECTION, SOLUTION INTRAVENOUS at 12:13

## 2024-03-12 NOTE — ED PROVIDER NOTES
Time: 12:19 PM EDT  Date of encounter:  3/12/2024  Independent Historian/Clinical History and Information was obtained by:   Patient and Nursing Staff    History is limited by: Altered Mental Status    Chief Complaint: Altered mental status, severely low blood sugar      History of Present Illness:  Patient is a 63 y.o. year old diabetic male wearing insulin pump who presents to the emergency department for evaluation of acute altered mental status while he was in the hospital visiting a friend or family member who was admitted.    He was noted to be very confused and altered and initial blood sugar on fingerstick glucose was only 13 and he was brought directly to the emergency department.    They were unable to get IV access so we placed access and recheck blood sugar which was 30, and administered IV dextrose in the ED.    He is now waking up and starting to talk and act more appropriate after the D50.    HPI    Patient Care Team  Primary Care Provider: Mana Stafford APRN    Past Medical History:     No Known Allergies  Past Medical History:   Diagnosis Date    Chronic diarrhea     COPD (chronic obstructive pulmonary disease)     CVA (cerebral vascular accident) 01/2023    Diabetes     TYPE 1/ on insulin pump    Histoplasmosis     on oxygen. pt see Dr. Mahesh riley and infectious dz md    Hypertension     Oxygen dependent      Past Surgical History:   Procedure Laterality Date    ABDOMINAL HERNIA REPAIR      CAROTID ENDARTERECTOMY Right 1/27/2023    Procedure: CAROTID ENDARTERECTOMY WITH EEG;  Surgeon: Maggie Spencer MD;  Location: [facility];  Service: Vascular;  Laterality: Right;    EYE SURGERY      X 4 FOR BLEEDING BEHIND BILATERAL EYES     History reviewed. No pertinent family history.    Home Medications:  Prior to Admission medications    Medication Sig Start Date End Date Taking? Authorizing Provider   albuterol (PROVENTIL) (2.5 MG/3ML) 0.083% nebulizer solution Take 2.5 mg by nebulization Every  4 (Four) Hours As Needed for Wheezing or Shortness of Air. 23   Zachary Ayala MD   amLODIPine (NORVASC) 10 MG tablet Take 0.5 tablets by mouth Daily. 23   Rosales Kebede MD   aspirin 81 MG chewable tablet Chew 1 tablet Daily.    Jose Raul Packer MD   benzonatate (TESSALON) 200 MG capsule Take 1 capsule by mouth 3 (Three) Times a Day As Needed for Cough.    Jose Raul Packer MD   Breztri Aerosphere 160-9-4.8 MCG/ACT aerosol inhaler Inhale 2 puffs 2 (Two) Times a Day. 23   Jose Raul Packer MD   ibuprofen (ADVIL,MOTRIN) 200 MG tablet Take 2 tablets by mouth Every 6 (Six) Hours As Needed for Mild Pain.    Jose Raul Packer MD   Insulin Lispro (humaLOG) 100 UNIT/ML injection Inject 75 Units under the skin into the appropriate area as directed Daily. Type of Insulin: Humalog 100 unit/ml Basal Dose:  Bolus Dose:  Prescriber Jayne Montoya Phone number [phone] Next refill 1/3/22 Refilled every 3 days only 95 units left 22   Jose Raul Packer MD   irbesartan (AVAPRO) 150 MG tablet Take 1 tablet by mouth Daily. 23   Jose Raul Packer MD   lisinopril (PRINIVIL,ZESTRIL) 20 MG tablet Take 1 tablet by mouth 2 (Two) Times a Day.    Jose Raul Packer MD   oxyCODONE-acetaminophen (Percocet) 5-325 MG per tablet Take 1 tablet by mouth Every 6 (Six) Hours As Needed for Severe Pain. 23   Lewis Lynn MD   rosuvastatin (Crestor) 20 MG tablet Take 1 tablet by mouth Daily for 30 days. 23  Zachary Ayala MD   traZODone (DESYREL) 50 MG tablet Take 1 tablet by mouth As Needed. Unsure of dose, pt spouse to call pharmacy to clarify    Jose Raul Packer MD        Social History:   Social History     Tobacco Use    Smoking status: Former     Current packs/day: 0.00     Average packs/day: 3.0 packs/day for 40.0 years (120.0 ttl pk-yrs)     Types: Cigarettes     Start date: 1982     Quit date: 2022     Years since quittin.1   Vaping Use    Vaping status: Never  "Used   Substance Use Topics    Alcohol use: Never    Drug use: Never         Review of Systems:  Review of Systems   I performed a 10 point review of systems which was all negative, except for the positives found in the HPI above.  Physical Exam:  /91   Pulse 80   Temp 97.4 °F (36.3 °C) (Oral)   Resp 16   Ht 180.3 cm (71\")   Wt 62.8 kg (138 lb 7.2 oz)   SpO2 100%   BMI 19.31 kg/m²     Physical Exam   General: Awake but he appears to be somewhat lethargic and confused and in no obvious distress    HEENT: Head normocephalic atraumatic, eyes PERRLA EOMI, nose normal, oropharynx normal.    Neck: Supple full range of motion, no meningismus, no lymphadenopathy    Heart: Regular rate and rhythm, no murmurs or rubs, 2+ radial pulses bilaterally    Lungs: Clear to auscultation bilaterally without wheezes or crackles, no respiratory distress    Abdomen: Soft, nontender, nondistended, no rebound or guarding    Skin: Warm, dry, no rash    Musculoskeletal: Normal range of motion, no lower extremity edema    Neurologic: Oriented to self but some altered mental status and confusion in the setting of low blood sugar, no motor deficits no sensory deficits    Psychiatric: Mood appears stable, no psychosis          Procedures:  Procedures      Medical Decision Making:      Comorbidities that affect care:    Diabetes    External Notes reviewed:    None      The following orders were placed and all results were independently analyzed by me:  Orders Placed This Encounter   Procedures    Diet: Regular/House; Fluid Consistency: Thin (IDDSI 0)    Please make sure patient eats sandwich and chips or peanut butter crackers, juice; reassess blood sugar within the hour  Nursing Communication    POC Glucose STAT    POC Glucose Once    POC Glucose Once       Medications Given in the Emergency Department:  Medications   dextrose (D50W) (25 g/50 mL) IV injection 25 g (25 g Intravenous Given 3/12/24 1213)        ED Course:     "     Labs:    Lab Results (last 24 hours)       Procedure Component Value Units Date/Time    POC Glucose Once [[id number]]  (Abnormal) Collected: 03/12/24 1209    Specimen: Blood Updated: 03/12/24 1446     Glucose 31 mg/dL      Comment: Serial Number: [id number]:  [id number]       POC Glucose Once [[id number]]  (Abnormal) Collected: 03/12/24 1257    Specimen: Blood Updated: 03/12/24 1259     Glucose 111 mg/dL      Comment: Serial Number: [id number]:  [id number]                Imaging:    No Radiology Exams Resulted Within Past 24 Hours      Differential Diagnosis and Discussion:    Altered Mental Status: Based on the patient's signs and symptoms, differential diagnosis includes but is not limited to meningitis, stroke, sepsis, subarachnoid hemorrhage, intracranial bleeding, encephalitis, and metabolic encephalopathy.    All labs were reviewed and interpreted by me.    MDM     Amount and/or Complexity of Data Reviewed  Clinical lab tests: reviewed             This patient is a 63-year-old male with type 1 diabetes on insulin pump who was visiting a friend or family member in the hospital and found to become confused and lethargic and initial blood sugar was only 13.    On arrival to the ED he is still somewhat lethargic and we obtained IV access and gave him a dose of IV D50 dextrose bringing his blood sugar up.    Shortly thereafter he was talking and acting more normal and back to baseline mentation.    We will continue to monitor blood sugars closely for the next few hours and have him eat a meal in the ED and drink some juice or eat crackers and sandwich to keep blood sugar stable here.        Repeat blood sugar up to 111, patient now is completely back to baseline mentation and talking fluently.    He actually states he has been eating normal breakfast and lunch today but his PCP just increased his insulin pump settings so possibly more aggressive dosages being administered.    I told him to continue to  monitor blood sugar closely today and consult with his primary care physician or endocrinologist ASAP regarding this hypoglycemic episode.    I offered to observe him further in the ED for more blood sugar checks, but he declined and feels better and needs to get home to his family member.                    Patient Care Considerations:          Consultants/Shared Management Plan:        Social Determinants of Health:    Patient is independent, reliable, and has access to care.       Disposition and Care Coordination:    Discharged: I considered escalation of care by admitting this patient to the hospital, however he is showing significant improvement after being given IV dextrose in the ED    I have explained the patient´s condition, diagnoses and treatment plan based on the information available to me at this time. I have answered questions and addressed any concerns. The patient has a good  understanding of the patient´s diagnosis, condition, and treatment plan as can be expected at this point. The vital signs have been stable. The patient´s condition is stable and appropriate for discharge from the emergency department.      The patient will pursue further outpatient evaluation with the primary care physician or other designated or consulting physician as outlined in the discharge instructions. They are agreeable to this plan of care and follow-up instructions have been explained in detail. The patient has received these instructions in written format and has expressed an understanding of the discharge instructions. The patient is aware that any significant change in condition or worsening of symptoms should prompt an immediate return to this or the closest emergency department or call to 911.  I have explained discharge medications and the need for follow up with the patient/caretakers. This was also printed in the discharge instructions. Patient was discharged with the following medications and follow up:       Medication List      No changes were made to your prescriptions during this visit.      Mana Stafford, APRN  [address] [facility] [address]  [place] [id number] [phone]    Call in 1 day  for a follow-up appointment       Final diagnoses:   Diabetic hypoglycemia        ED Disposition       ED Disposition   Discharge    Condition   Stable    Comment   --               This medical record created using voice recognition software.             Yuan Thornton MD  03/12/24 [id number]

## 2024-03-12 NOTE — DISCHARGE INSTRUCTIONS
Apparently your blood sugar was down as low as 13 today and measured 31 when you arrived in the emergency department and were given some IV dextrose sugar.    Currently was 111 at time of discharge but make sure you monitor your sugars closely and eat regular meals with carbohydrates to prevent from dropping.    It sounds like you may have had your insulin pump adjusted recently so please call your doctor and let them know your blood sugar was very low and you ended up in the ER today.

## 2024-03-18 LAB — GLUCOSE BLDC GLUCOMTR-MCNC: 13 MG/DL (ref 70–99)

## 2024-10-14 ENCOUNTER — TRANSCRIBE ORDERS (OUTPATIENT)
Dept: ADMINISTRATIVE | Facility: HOSPITAL | Age: 64
End: 2024-10-14
Payer: MEDICARE

## 2024-10-14 DIAGNOSIS — B39.1 HISTOPLASMOSIS, CHRONIC CAVITARY: Primary | ICD-10-CM

## 2024-12-29 ENCOUNTER — APPOINTMENT (OUTPATIENT)
Dept: GENERAL RADIOLOGY | Facility: HOSPITAL | Age: 64
End: 2024-12-29
Payer: MEDICARE

## 2024-12-29 ENCOUNTER — HOSPITAL ENCOUNTER (EMERGENCY)
Facility: HOSPITAL | Age: 64
Discharge: HOME | End: 2024-12-29
Attending: EMERGENCY MEDICINE | Admitting: EMERGENCY MEDICINE
Payer: MEDICARE

## 2024-12-29 VITALS
OXYGEN SATURATION: 95 % | HEIGHT: 67 IN | TEMPERATURE: 98.2 F | RESPIRATION RATE: 22 BRPM | DIASTOLIC BLOOD PRESSURE: 62 MMHG | SYSTOLIC BLOOD PRESSURE: 125 MMHG | WEIGHT: 122.8 LBS | HEART RATE: 88 BPM | BODY MASS INDEX: 19.27 KG/M2

## 2024-12-29 DIAGNOSIS — E86.0 DEHYDRATION: ICD-10-CM

## 2024-12-29 DIAGNOSIS — J44.1 COPD EXACERBATION: Primary | ICD-10-CM

## 2024-12-29 LAB
ALBUMIN SERPL-MCNC: 3.9 G/DL (ref 3.5–5.2)
ALBUMIN/GLOB SERPL: 1.1 G/DL
ALP SERPL-CCNC: 139 U/L (ref 39–117)
ALT SERPL W P-5'-P-CCNC: 16 U/L (ref 1–41)
ANION GAP SERPL CALCULATED.3IONS-SCNC: 13.4 MMOL/L (ref 5–15)
AST SERPL-CCNC: 18 U/L (ref 1–40)
BASOPHILS # BLD AUTO: 0.06 10*3/MM3 (ref 0–0.2)
BASOPHILS NFR BLD AUTO: 0.4 % (ref 0–1.5)
BILIRUB SERPL-MCNC: 0.7 MG/DL (ref 0–1.2)
BUN SERPL-MCNC: 30 MG/DL (ref 8–23)
BUN/CREAT SERPL: 31.9 (ref 7–25)
CALCIUM SPEC-SCNC: 8.9 MG/DL (ref 8.6–10.5)
CHLORIDE SERPL-SCNC: 94 MMOL/L (ref 98–107)
CO2 SERPL-SCNC: 28.6 MMOL/L (ref 22–29)
CREAT SERPL-MCNC: 0.94 MG/DL (ref 0.76–1.27)
D-LACTATE SERPL-SCNC: 1.2 MMOL/L (ref 0.5–2)
DEPRECATED RDW RBC AUTO: 45.2 FL (ref 37–54)
EGFRCR SERPLBLD CKD-EPI 2021: 90 ML/MIN/1.73
EOSINOPHIL # BLD AUTO: 0.02 10*3/MM3 (ref 0–0.4)
EOSINOPHIL NFR BLD AUTO: 0.1 % (ref 0.3–6.2)
ERYTHROCYTE [DISTWIDTH] IN BLOOD BY AUTOMATED COUNT: 13.2 % (ref 12.3–15.4)
FLUAV SUBTYP SPEC NAA+PROBE: NOT DETECTED
FLUBV RNA ISLT QL NAA+PROBE: NOT DETECTED
GEN 5 1HR TROPONIN T REFLEX: 42 NG/L
GLOBULIN UR ELPH-MCNC: 3.7 GM/DL
GLUCOSE SERPL-MCNC: 287 MG/DL (ref 65–99)
HCT VFR BLD AUTO: 45.9 % (ref 37.5–51)
HGB BLD-MCNC: 14.8 G/DL (ref 13–17.7)
HOLD SPECIMEN: NORMAL
HOLD SPECIMEN: NORMAL
IMM GRANULOCYTES # BLD AUTO: 0.03 10*3/MM3 (ref 0–0.05)
IMM GRANULOCYTES NFR BLD AUTO: 0.2 % (ref 0–0.5)
LYMPHOCYTES # BLD AUTO: 1.15 10*3/MM3 (ref 0.7–3.1)
LYMPHOCYTES NFR BLD AUTO: 8.6 % (ref 19.6–45.3)
MCH RBC QN AUTO: 30.3 PG (ref 26.6–33)
MCHC RBC AUTO-ENTMCNC: 32.2 G/DL (ref 31.5–35.7)
MCV RBC AUTO: 94.1 FL (ref 79–97)
MONOCYTES # BLD AUTO: 1.65 10*3/MM3 (ref 0.1–0.9)
MONOCYTES NFR BLD AUTO: 12.4 % (ref 5–12)
NEUTROPHILS NFR BLD AUTO: 10.45 10*3/MM3 (ref 1.7–7)
NEUTROPHILS NFR BLD AUTO: 78.3 % (ref 42.7–76)
NRBC BLD AUTO-RTO: 0 /100 WBC (ref 0–0.2)
NT-PROBNP SERPL-MCNC: 542.3 PG/ML (ref 0–900)
PLATELET # BLD AUTO: 256 10*3/MM3 (ref 140–450)
PMV BLD AUTO: 9.6 FL (ref 6–12)
POTASSIUM SERPL-SCNC: 4.3 MMOL/L (ref 3.5–5.2)
PROT SERPL-MCNC: 7.6 G/DL (ref 6–8.5)
QT INTERVAL: 329 MS
QTC INTERVAL: 425 MS
RBC # BLD AUTO: 4.88 10*6/MM3 (ref 4.14–5.8)
RSV RNA NPH QL NAA+NON-PROBE: NOT DETECTED
S PYO AG THROAT QL: NEGATIVE
SARS-COV-2 RNA RESP QL NAA+PROBE: NOT DETECTED
SODIUM SERPL-SCNC: 136 MMOL/L (ref 136–145)
TROPONIN T % DELTA: 14 %
TROPONIN T NUMERIC DELTA: 5 NG/L
TROPONIN T SERPL HS-MCNC: 37 NG/L
WBC NRBC COR # BLD AUTO: 13.36 10*3/MM3 (ref 3.4–10.8)
WHOLE BLOOD HOLD COAG: NORMAL
WHOLE BLOOD HOLD SPECIMEN: NORMAL

## 2024-12-29 PROCEDURE — 93005 ELECTROCARDIOGRAM TRACING: CPT | Performed by: EMERGENCY MEDICINE

## 2024-12-29 PROCEDURE — 25810000003 SODIUM CHLORIDE 0.9 % SOLUTION: Performed by: EMERGENCY MEDICINE

## 2024-12-29 PROCEDURE — 87880 STREP A ASSAY W/OPTIC: CPT | Performed by: EMERGENCY MEDICINE

## 2024-12-29 PROCEDURE — 93005 ELECTROCARDIOGRAM TRACING: CPT

## 2024-12-29 PROCEDURE — 71045 X-RAY EXAM CHEST 1 VIEW: CPT

## 2024-12-29 PROCEDURE — 80053 COMPREHEN METABOLIC PANEL: CPT

## 2024-12-29 PROCEDURE — 84484 ASSAY OF TROPONIN QUANT: CPT

## 2024-12-29 PROCEDURE — 83605 ASSAY OF LACTIC ACID: CPT | Performed by: EMERGENCY MEDICINE

## 2024-12-29 PROCEDURE — 84484 ASSAY OF TROPONIN QUANT: CPT | Performed by: EMERGENCY MEDICINE

## 2024-12-29 PROCEDURE — 87081 CULTURE SCREEN ONLY: CPT | Performed by: EMERGENCY MEDICINE

## 2024-12-29 PROCEDURE — 36415 COLL VENOUS BLD VENIPUNCTURE: CPT

## 2024-12-29 PROCEDURE — 96374 THER/PROPH/DIAG INJ IV PUSH: CPT

## 2024-12-29 PROCEDURE — 99284 EMERGENCY DEPT VISIT MOD MDM: CPT

## 2024-12-29 PROCEDURE — 87040 BLOOD CULTURE FOR BACTERIA: CPT | Performed by: EMERGENCY MEDICINE

## 2024-12-29 PROCEDURE — 87637 SARSCOV2&INF A&B&RSV AMP PRB: CPT | Performed by: EMERGENCY MEDICINE

## 2024-12-29 PROCEDURE — 83880 ASSAY OF NATRIURETIC PEPTIDE: CPT

## 2024-12-29 PROCEDURE — 25010000002 METHYLPREDNISOLONE PER 125 MG: Performed by: EMERGENCY MEDICINE

## 2024-12-29 PROCEDURE — 85025 COMPLETE CBC W/AUTO DIFF WBC: CPT

## 2024-12-29 RX ORDER — SODIUM CHLORIDE 0.9 % (FLUSH) 0.9 %
10 SYRINGE (ML) INJECTION AS NEEDED
Status: DISCONTINUED | OUTPATIENT
Start: 2024-12-29 | End: 2024-12-29 | Stop reason: HOSPADM

## 2024-12-29 RX ORDER — METHYLPREDNISOLONE SODIUM SUCCINATE 125 MG/2ML
125 INJECTION, POWDER, LYOPHILIZED, FOR SOLUTION INTRAMUSCULAR; INTRAVENOUS ONCE
Status: COMPLETED | OUTPATIENT
Start: 2024-12-29 | End: 2024-12-29

## 2024-12-29 RX ORDER — BENZONATATE 200 MG/1
200 CAPSULE ORAL 3 TIMES DAILY PRN
Qty: 30 CAPSULE | Refills: 0 | Status: SHIPPED | OUTPATIENT
Start: 2024-12-29

## 2024-12-29 RX ORDER — PREDNISONE 50 MG/1
50 TABLET ORAL DAILY
Qty: 5 TABLET | Refills: 0 | Status: SHIPPED | OUTPATIENT
Start: 2024-12-29

## 2024-12-29 RX ORDER — AZITHROMYCIN 250 MG/1
TABLET, FILM COATED ORAL
Qty: 6 TABLET | Refills: 0 | Status: SHIPPED | OUTPATIENT
Start: 2024-12-29

## 2024-12-29 RX ADMIN — SODIUM CHLORIDE 1000 ML: 9 INJECTION, SOLUTION INTRAVENOUS at 13:46

## 2024-12-29 RX ADMIN — METHYLPREDNISOLONE SODIUM SUCCINATE 125 MG: 125 INJECTION, POWDER, FOR SOLUTION INTRAMUSCULAR; INTRAVENOUS at 13:32

## 2024-12-29 NOTE — ED PROVIDER NOTES
Time: 12:29 PM EST  Date of encounter:  12/29/2024  Independent Historian/Clinical History and Information was obtained by:   Patient    History is limited by: N/A    Chief Complaint: Dyspnea      History of Present Illness:  Patient is a 64 y.o. year old male who presents to the emergency department for evaluation of dyspnea and cough x 2 weeks.  Patient states he is having a productive cough of dark brown sputum.  She wears oxygen at home at all times on 3 L due to COPD.  Patient states he has been having low-grade fevers.        Patient Care Team  Primary Care Provider: Mana Stafford APRN    Past Medical History:     No Known Allergies  Past Medical History:   Diagnosis Date    Chronic diarrhea     COPD (chronic obstructive pulmonary disease)     CVA (cerebral vascular accident) 01/2023    Diabetes     TYPE 1/ on insulin pump    Histoplasmosis     on oxygen. pt see Dr. Mahesh riley and infectious dz md    Hypertension     Oxygen dependent      Past Surgical History:   Procedure Laterality Date    ABDOMINAL HERNIA REPAIR      CAROTID ENDARTERECTOMY Right 1/27/2023    Procedure: CAROTID ENDARTERECTOMY WITH EEG;  Surgeon: Maggie Spencer MD;  Location: [facility];  Service: Vascular;  Laterality: Right;    EYE SURGERY      X 4 FOR BLEEDING BEHIND BILATERAL EYES     History reviewed. No pertinent family history.    Home Medications:  Prior to Admission medications    Medication Sig Start Date End Date Taking? Authorizing Provider   albuterol (PROVENTIL) (2.5 MG/3ML) 0.083% nebulizer solution Take 2.5 mg by nebulization Every 4 (Four) Hours As Needed for Wheezing or Shortness of Air. 1/7/23   Zachary Ayala MD   amLODIPine (NORVASC) 10 MG tablet Take 0.5 tablets by mouth Daily. 2/9/23   Rosales Kebede MD   aspirin 81 MG chewable tablet Chew 1 tablet Daily.    Provider, MD Jose Raul   benzonatate (TESSALON) 200 MG capsule Take 1 capsule by mouth 3 (Three) Times a Day As Needed for Cough.    Provider,  "MD Felipa Blunt Aerosphere 160-9-4.8 MCG/ACT aerosol inhaler Inhale 2 puffs 2 (Two) Times a Day. 23   Jose Raul Packer MD   ibuprofen (ADVIL,MOTRIN) 200 MG tablet Take 2 tablets by mouth Every 6 (Six) Hours As Needed for Mild Pain.    Jose Raul Packer MD   Insulin Lispro (humaLOG) 100 UNIT/ML injection Inject 75 Units under the skin into the appropriate area as directed Daily. Type of Insulin: Humalog 100 unit/ml Basal Dose:  Bolus Dose:  Prescriber Jayne Montoya Phone number [phone] Next refill 1/3/22 Refilled every 3 days only 95 units left 22   Jose Raul Packer MD   irbesartan (AVAPRO) 150 MG tablet Take 1 tablet by mouth Daily. 23   Jose Raul Packer MD   lisinopril (PRINIVIL,ZESTRIL) 20 MG tablet Take 1 tablet by mouth 2 (Two) Times a Day.    Jose Raul Packer MD   oxyCODONE-acetaminophen (Percocet) 5-325 MG per tablet Take 1 tablet by mouth Every 6 (Six) Hours As Needed for Severe Pain. 23   Lewis Lynn MD   rosuvastatin (Crestor) 20 MG tablet Take 1 tablet by mouth Daily for 30 days. 23  Zachary Ayala MD   traZODone (DESYREL) 50 MG tablet Take 1 tablet by mouth As Needed. Unsure of dose, pt spouse to call pharmacy to clarify    Jose Raul Packer MD        Social History:   Social History     Tobacco Use    Smoking status: Former     Current packs/day: 0.00     Average packs/day: 3.0 packs/day for 40.0 years (120.0 ttl pk-yrs)     Types: Cigarettes     Start date: 1982     Quit date: 2022     Years since quittin.9   Vaping Use    Vaping status: Never Used   Substance Use Topics    Alcohol use: Never    Drug use: Never         Review of Systems:  Review of Systems   Constitutional:  Positive for fever.   Respiratory:  Positive for cough and shortness of breath.         Physical Exam:  /66 (BP Location: Left arm, Patient Position: Lying)   Pulse 88   Temp 98.2 °F (36.8 °C) (Oral)   Resp 28   Ht 170.2 cm (67\")   Wt " 55.7 kg (122 lb 12.7 oz)   SpO2 97%   BMI 19.23 kg/m²     Physical Exam  Vitals and nursing note reviewed.   Constitutional:       General: He is not in acute distress.     Appearance: Normal appearance.   Eyes:      Conjunctiva/sclera: Conjunctivae normal.   Cardiovascular:      Rate and Rhythm: Normal rate and regular rhythm.      Heart sounds: Normal heart sounds.   Pulmonary:      Effort: Pulmonary effort is normal. No respiratory distress.      Breath sounds: Examination of the right-upper field reveals decreased breath sounds. Examination of the left-upper field reveals decreased breath sounds. Examination of the right-middle field reveals decreased breath sounds. Examination of the left-middle field reveals decreased breath sounds. Examination of the right-lower field reveals decreased breath sounds. Examination of the left-lower field reveals decreased breath sounds.   Abdominal:      General: Abdomen is flat. There is no distension.      Palpations: Abdomen is soft.      Tenderness: There is no abdominal tenderness.   Musculoskeletal:         General: Normal range of motion.      Cervical back: Normal range of motion.   Skin:     General: Skin is warm and dry.      Coloration: Skin is not cyanotic.   Neurological:      Mental Status: He is alert and oriented to person, place, and time. Mental status is at baseline.   Psychiatric:         Attention and Perception: Attention and perception normal.                    Medical Decision Making:      Comorbidities that affect care:    Diabetes, COPD, hypertension    External Notes reviewed:    Previous ED Note: Patient seen 3/12/2024 for hyperglycemia discharge      The following orders were placed and all results were independently analyzed by me:  Orders Placed This Encounter   Procedures    COVID-19, FLU A/B, RSV PCR 1 HR TAT - Swab, Nasopharynx    Rapid Strep A Screen - Swab, Throat    Blood Culture - Blood,    Blood Culture - Blood,    Beta Strep Culture,  Throat - Swab, Throat    XR Chest 1 View    [place] Draw    Comprehensive Metabolic Panel    BNP    High Sensitivity Troponin T    CBC Auto Differential    Lactic Acid, Plasma    High Sensitivity Troponin T 1Hr    NPO Diet NPO Type: Strict NPO    Undress & Gown    Vital Signs    Continuous Pulse Oximetry    Oxygen Therapy- Nasal Cannula; Titrate 1-6 LPM Per SpO2; 90 - 95%    ECG 12 Lead ED Triage Standing Order; SOA    Insert Peripheral IV    CBC & Differential    Green Top (Gel)    Lavender Top    Gold Top - SST    Light Blue Top       Medications Given in the Emergency Department:  Medications   sodium chloride 0.9 % flush 10 mL (has no administration in time range)   sodium chloride 0.9 % flush 10 mL (has no administration in time range)   methylPREDNISolone sodium succinate (SOLU-Medrol) injection 125 mg (125 mg Intravenous Given 12/29/24 1332)   sodium chloride 0.9 % bolus 1,000 mL (1,000 mL Intravenous New Bag 12/29/24 1346)        ED Course:    ED Course as of 12/29/24 1419   Sun Dec 29, 2024   1125 EKG:    Rhythm: Sinus tachycardia   rate: 100  Intervals: Normal  T-wave: Normal  ST Segment: Changes    EKG Comparison: No prior    Interpreted by me   [NL]      ED Course User Index  [NL] Bonifacio Barney DO       Labs:    Lab Results (last 24 hours)       Procedure Component Value Units Date/Time    CBC & Differential [[id number]]  (Abnormal) Collected: 12/29/24 1133    Specimen: Blood Updated: 12/29/24 1144    Narrative:      The following orders were created for panel order CBC & Differential.  Procedure                               Abnormality         Status                     ---------                               -----------         ------                     CBC Auto Differential[[id number]]        Abnormal            Final result                 Please view results for these tests on the individual orders.    Comprehensive Metabolic Panel [[id number]]  (Abnormal) Collected: 12/29/24 1133    Specimen: Blood  Updated: 12/29/24 1205     Glucose 287 mg/dL      BUN 30 mg/dL      Creatinine 0.94 mg/dL      Sodium 136 mmol/L      Potassium 4.3 mmol/L      Chloride 94 mmol/L      CO2 28.6 mmol/L      Calcium 8.9 mg/dL      Total Protein 7.6 g/dL      Albumin 3.9 g/dL      ALT (SGPT) 16 U/L      AST (SGOT) 18 U/L      Alkaline Phosphatase 139 U/L      Total Bilirubin 0.7 mg/dL      Globulin 3.7 gm/dL      A/G Ratio 1.1 g/dL      BUN/Creatinine Ratio 31.9     Anion Gap 13.4 mmol/L      eGFR 90.0 mL/min/1.73     Narrative:      GFR Categories in Chronic Kidney Disease (CKD)      GFR Category          GFR (mL/min/1.73)    Interpretation  G1                     90 or greater         Normal or high (1)  G2                      60-89                Mild decrease (1)  G3a                   45-59                Mild to moderate decrease  G3b                   30-44                Moderate to severe decrease  G4                    15-29                Severe decrease  G5                    14 or less           Kidney failure          (1)In the absence of evidence of kidney disease, neither GFR category G1 or G2 fulfill the criteria for CKD.    eGFR calculation 2021 CKD-EPI creatinine equation, which does not include race as a factor    BNP [[id number]]  (Normal) Collected: 12/29/24 1133    Specimen: Blood Updated: 12/29/24 1202     proBNP 542.3 pg/mL     Narrative:      This assay is used as an aid in the diagnosis of individuals suspected of having heart failure. It can be used as an aid in the diagnosis of acute decompensated heart failure (ADHF) in patients presenting with signs and symptoms of ADHF to the emergency department (ED). In addition, NT-proBNP of <300 pg/mL indicates ADHF is not likely.    Age Range Result Interpretation  NT-proBNP Concentration (pg/mL:      <50             Positive            >450                   Gray                 300-450                    Negative             <300    50-75           Positive             >900                  Gray                300-900                  Negative            <300      >75             Positive            >1800                  Gray                300-1800                  Negative            <300    High Sensitivity Troponin T [[id number]]  (Abnormal) Collected: 12/29/24 1133    Specimen: Blood Updated: 12/29/24 1204     HS Troponin T 37 ng/L     Narrative:      High Sensitive Troponin T Reference Range:  <14.0 ng/L- Negative Female for AMI  <22.0 ng/L- Negative Male for AMI  >=14 - Abnormal Female indicating possible myocardial injury.  >=22 - Abnormal Male indicating possible myocardial injury.   Clinicians would have to utilize clinical acumen, EKG, Troponin, and serial changes to determine if it is an Acute Myocardial Infarction or myocardial injury due to an underlying chronic condition.         CBC Auto Differential [[id number]]  (Abnormal) Collected: 12/29/24 1133    Specimen: Blood Updated: 12/29/24 1144     WBC 13.36 10*3/mm3      RBC 4.88 10*6/mm3      Hemoglobin 14.8 g/dL      Hematocrit 45.9 %      MCV 94.1 fL      MCH 30.3 pg      MCHC 32.2 g/dL      RDW 13.2 %      RDW-SD 45.2 fl      MPV 9.6 fL      Platelets 256 10*3/mm3      Neutrophil % 78.3 %      Lymphocyte % 8.6 %      Monocyte % 12.4 %      Eosinophil % 0.1 %      Basophil % 0.4 %      Immature Grans % 0.2 %      Neutrophils, Absolute 10.45 10*3/mm3      Lymphocytes, Absolute 1.15 10*3/mm3      Monocytes, Absolute 1.65 10*3/mm3      Eosinophils, Absolute 0.02 10*3/mm3      Basophils, Absolute 0.06 10*3/mm3      Immature Grans, Absolute 0.03 10*3/mm3      nRBC 0.0 /100 WBC     COVID-19, FLU A/B, RSV PCR 1 HR TAT - Swab, Nasopharynx [[id number]]  (Normal) Collected: 12/29/24 1133    Specimen: Swab from Nasopharynx Updated: 12/29/24 1300     COVID19 Not Detected     Influenza A PCR Not Detected     Influenza B PCR Not Detected     RSV, PCR Not Detected    Narrative:      Fact sheet for providers:  https://www.fda.gov/media/551333/download    Fact sheet for patients: https://www.fda.gov/media/686838/download    Test performed by PCR.    Rapid Strep A Screen - Swab, Throat [[id number]]  (Normal) Collected: 12/29/24 1136    Specimen: Swab from Throat Updated: 12/29/24 1239     Strep A Ag Negative    Beta Strep Culture, Throat - Swab, Throat [[id number]] Collected: 12/29/24 1136    Specimen: Swab from Throat Updated: 12/29/24 1239    Lactic Acid, Plasma [[id number]]  (Normal) Collected: 12/29/24 1324    Specimen: Blood Updated: 12/29/24 1404     Lactate 1.2 mmol/L     Blood Culture - Blood, Arm, Left [[id number]] Collected: 12/29/24 1324    Specimen: Blood from Arm, Left Updated: 12/29/24 1339    Blood Culture - Blood, Arm, Left [[id number]] Collected: 12/29/24 1324    Specimen: Blood from Arm, Left Updated: 12/29/24 1339    High Sensitivity Troponin T 1Hr [[id number]]  (Abnormal) Collected: 12/29/24 1324    Specimen: Blood Updated: 12/29/24 1404     HS Troponin T 42 ng/L      Troponin T Numeric Delta 5 ng/L      Troponin T % Delta 14 %     Narrative:      High Sensitive Troponin T Reference Range:  <14.0 ng/L- Negative Female for AMI  <22.0 ng/L- Negative Male for AMI  >=14 - Abnormal Female indicating possible myocardial injury.  >=22 - Abnormal Male indicating possible myocardial injury.   Clinicians would have to utilize clinical acumen, EKG, Troponin, and serial changes to determine if it is an Acute Myocardial Infarction or myocardial injury due to an underlying chronic condition.                  Imaging:    XR Chest 1 View    Result Date: 12/29/2024  XR CHEST 1 VW Date of Exam: 12/29/2024 11:56 AM EST Indication: SOA Triage Protocol Comparison: CXR 1/27/2023, CT chest 1/2/2023 Findings: The heart is normal in size. The lungs have a hyperexpanded appearance consistent with emphysema. Cavitary changes in the right upper lobe are stable. Nodular opacities in the left upper lobe are unchanged. Bony structures  appear intact.     Impression: Emphysema. No significant change in comparison to 1/27/2023, as above. Electronically Signed: Flakito Burnett MD  12/29/2024 12:14 PM EST  Workstation ID: [id number]       Differential Diagnosis and Discussion:    Dyspnea: Differential diagnosis includes but is not limited to metabolic acidosis, neurological disorders, psychogenic, asthma, pneumothorax, upper airway obstruction, COPD, pneumonia, noncardiogenic pulmonary edema, interstitial lung disease, anemia, congestive heart failure, and pulmonary embolism    PROCEDURES:    Labs were collected in the emergency department and all labs were reviewed and interpreted by me.  X-ray were performed in the emergency department and all X-ray impressions were independently interpreted by me.  An EKG was performed and the EKG was interpreted by me.    ECG 12 Lead ED Triage Standing Order; SOA   Preliminary Result   HEART UAHX=891  bpm   RR Nyyluihg=905  ms   VT Rknmqxki=986  ms   P Horizontal Axis=-36  deg   P Front Axis=89  deg   QRSD Interval=84  ms   QT Xwtoevxi=599  ms   WLsD=820  ms   QRS Axis=44  deg   T Wave Axis=84  deg   - ABNORMAL ECG -   Sinus tachycardia   Left atrial enlargement   Nonspecific T abnrm, anterolateral leads   Date and Time of Study:2024-12-29 11:25:51          Procedures    MDM  The patient presents with shortness of breath consistent with their COPD exacerbations. The patient was monitored in the ED for the patient presents with shortness of breath consistent with their asthma exacerbations. The patient reports significant relief of their symptoms with ED treatment. The patient has no respiratory distress or hypoxia.  The patient's condition is stable and appropriate for discharge. The patient will be discharged with a prescription for antibiotic and a steroid. The patient was advised to return for fever, respiratory distress, intractable vomiting, weakness, worsening shortness of breath, chest pain, or altered mental  status.         Patient Care Considerations:    SEPSIS was considered but is NOT present in the emergency department as SIRS criteria is not present.      Consultants/Shared Management Plan:    None    Social Determinants of Health:    Patient is independent, reliable, and has access to care.       Disposition and Care Coordination:    Discharged: The patient is suitable and stable for discharge with no need for consideration of admission.    I have explained the patient´s condition, diagnoses and treatment plan based on the information available to me at this time. I have answered questions and addressed any concerns. The patient has a good  understanding of the patient´s diagnosis, condition, and treatment plan as can be expected at this point. The vital signs have been stable. The patient´s condition is stable and appropriate for discharge from the emergency department.      The patient will pursue further outpatient evaluation with the primary care physician or other designated or consulting physician as outlined in the discharge instructions. They are agreeable to this plan of care and follow-up instructions have been explained in detail. The patient has received these instructions in written format and has expressed an understanding of the discharge instructions. The patient is aware that any significant change in condition or worsening of symptoms should prompt an immediate return to this or the closest emergency department or call to 911.  I have explained discharge medications and the need for follow up with the patient/caretakers. This was also printed in the discharge instructions. Patient was discharged with the following medications and follow up:      Medication List        New Prescriptions      azithromycin 250 MG tablet  Commonly known as: Zithromax Z-Chapo  Take 2 tablets by mouth on day 1, then 1 tablet daily on days 2-5     predniSONE 50 MG tablet  Commonly known as: DELTASONE  Take 1 tablet by  mouth Daily.            Changed      * benzonatate 200 MG capsule  Commonly known as: TESSALON  What changed: Another medication with the same name was added. Make sure you understand how and when to take each.     * benzonatate 200 MG capsule  Commonly known as: TESSALON  Take 1 capsule by mouth 3 (Three) Times a Day As Needed for Cough.  What changed: You were already taking a medication with the same name, and this prescription was added. Make sure you understand how and when to take each.           * This list has 2 medication(s) that are the same as other medications prescribed for you. Read the directions carefully, and ask your doctor or other care provider to review them with you.                   Where to Get Your Medications        These medications were sent to [facility] [address] [place] [address] - [address] [place] [address] [facility] [phone] [facility] [phone] [address] [facility] [address]      Phone: [phone]   azithromycin 250 MG tablet  benzonatate 200 MG capsule  predniSONE 50 MG tablet      Mana Stafford, APRN  [address]  [address]  [place] [id number]  [phone]    Schedule an appointment as soon as possible for a visit          Final diagnoses:   COPD exacerbation   Dehydration        ED Disposition       ED Disposition   Discharge    Condition   Stable    Comment   --               This medical record created using voice recognition software.             Bonifacio Barney, DO  12/29/24 1406       Bonifacio Barney, DO  12/29/24 1415

## 2024-12-31 LAB — BACTERIA SPEC AEROBE CULT: NORMAL

## 2025-01-03 LAB
BACTERIA SPEC AEROBE CULT: NORMAL
BACTERIA SPEC AEROBE CULT: NORMAL

## 2025-01-22 LAB
QT INTERVAL: 329 MS
QTC INTERVAL: 425 MS

## 2025-01-23 ENCOUNTER — TRANSCRIBE ORDERS (OUTPATIENT)
Dept: ADMINISTRATIVE | Facility: HOSPITAL | Age: 65
End: 2025-01-23
Payer: MEDICARE

## 2025-01-23 DIAGNOSIS — R93.89 ABNORMAL CHEST X-RAY: Primary | ICD-10-CM

## 2025-01-24 ENCOUNTER — HOSPITAL ENCOUNTER (OUTPATIENT)
Dept: CT IMAGING | Facility: HOSPITAL | Age: 65
Discharge: HOME | End: 2025-01-24
Payer: MEDICARE

## 2025-01-24 DIAGNOSIS — R93.89 ABNORMAL CHEST X-RAY: ICD-10-CM

## 2025-01-24 PROCEDURE — 71275 CT ANGIOGRAPHY CHEST: CPT

## 2025-01-24 PROCEDURE — 25510000001 IOPAMIDOL PER 1 ML: Performed by: NURSE PRACTITIONER

## 2025-01-24 RX ORDER — IOPAMIDOL 755 MG/ML
100 INJECTION, SOLUTION INTRAVASCULAR
Status: COMPLETED | OUTPATIENT
Start: 2025-01-24 | End: 2025-01-24

## 2025-01-24 RX ADMIN — IOPAMIDOL 100 ML: 755 INJECTION, SOLUTION INTRAVENOUS at 08:39

## 2025-01-29 ENCOUNTER — OFFICE VISIT (OUTPATIENT)
Dept: PULMONOLOGY | Facility: CLINIC | Age: 65
End: 2025-01-29
Payer: MEDICARE

## 2025-01-29 VITALS
SYSTOLIC BLOOD PRESSURE: 137 MMHG | DIASTOLIC BLOOD PRESSURE: 89 MMHG | BODY MASS INDEX: 21.47 KG/M2 | HEIGHT: 67 IN | RESPIRATION RATE: 16 BRPM | WEIGHT: 136.8 LBS | OXYGEN SATURATION: 94 % | TEMPERATURE: 98.6 F | HEART RATE: 90 BPM

## 2025-01-29 DIAGNOSIS — B39.1 HISTOPLASMOSIS, CHRONIC CAVITARY: ICD-10-CM

## 2025-01-29 DIAGNOSIS — R63.4 UNINTENDED WEIGHT LOSS: ICD-10-CM

## 2025-01-29 DIAGNOSIS — R91.8 MULTIPLE LUNG NODULES: ICD-10-CM

## 2025-01-29 DIAGNOSIS — J43.2 CENTRILOBULAR EMPHYSEMA: Primary | ICD-10-CM

## 2025-01-29 DIAGNOSIS — R06.09 DOE (DYSPNEA ON EXERTION): ICD-10-CM

## 2025-01-29 DIAGNOSIS — R05.3 CHRONIC COUGH: ICD-10-CM

## 2025-01-29 RX ORDER — INSULIN ASPART 100 [IU]/ML
INJECTION, SOLUTION INTRAVENOUS; SUBCUTANEOUS
COMMUNITY
Start: 2024-12-17 | End: 2025-01-31

## 2025-01-29 RX ORDER — INSULIN PMP CART,AUT,G6/7,CNTR
EACH SUBCUTANEOUS
COMMUNITY
Start: 2025-01-16

## 2025-01-29 RX ORDER — PREDNISONE 10 MG/1
TABLET ORAL
Qty: 31 TABLET | Refills: 0 | Status: SHIPPED | OUTPATIENT
Start: 2025-01-29

## 2025-01-29 RX ORDER — CITALOPRAM HYDROBROMIDE 20 MG/1
20 TABLET ORAL DAILY
COMMUNITY

## 2025-01-29 RX ORDER — BUDESONIDE, GLYCOPYRROLATE, AND FORMOTEROL FUMARATE 160; 9; 4.8 UG/1; UG/1; UG/1
2 AEROSOL, METERED RESPIRATORY (INHALATION) 2 TIMES DAILY
Qty: 1 EACH | Refills: 11 | Status: SHIPPED | OUTPATIENT
Start: 2025-01-29

## 2025-01-29 RX ORDER — CLONIDINE HYDROCHLORIDE 0.1 MG/1
TABLET ORAL
COMMUNITY

## 2025-01-29 RX ORDER — IPRATROPIUM BROMIDE AND ALBUTEROL SULFATE 2.5; .5 MG/3ML; MG/3ML
SOLUTION RESPIRATORY (INHALATION) EVERY 6 HOURS SCHEDULED
COMMUNITY
Start: 2025-01-23

## 2025-01-29 RX ORDER — BENZONATATE 200 MG/1
200 CAPSULE ORAL 3 TIMES DAILY PRN
Qty: 30 CAPSULE | Refills: 4 | Status: SHIPPED | OUTPATIENT
Start: 2025-01-29

## 2025-01-30 NOTE — PROGRESS NOTES
Primary Care Provider  Mana Stafford APRN   Referring Provider  ROGE Nath      Patient Complaint  Establish Care, Lung Nodule, Shortness of Breath (Constant ), Cough (Productive, gray), Wheezing, Chest Tightness, and Fatigue      Subjective          Tyrone Garcia presents to [facility] PULMONARY & CRITICAL CARE MEDICINE      History of Presenting Illness  Tyrone Garcia is a 64 y.o. male here to establish care.  Previously was seen by us during hospitalizations in 2023.  Was seeing [facility] pulmonary as well as infectious disease for chronic cavitary histoplasmosis.  Patient has a history of severe COPD with FEV1 of 40% in 2022, multiple lung nodules and a large right cavitary lesion which was positive for histoplasmosis on bronchoscopy number years ago.  He is here to establish care here as he lives closer to here and would like to see pulmonary closer to here.  He has not seen ID or pulmonary in quite some time.  He has not been on itraconazole for over 8 months after previously being on it for almost 2 years.  He wears 3 to 4 L of oxygen continuously at baseline.  He is a former cigarette smoker of 90 pack years and quit smoking about 5 years ago.  Over the last couple months he reports 10 to 15 pounds of unintentional weight loss, worsening fatigue and feeling poorly.  Has increased hacking cough productive of thick cloudy secretions and yellow secretions.  He gets short of breath very easily, for example going up a flight of steps.  Dyspnea is moderate severity, worse with activity and relieved with rest.  He does take albuterol and Breztri which has helped somewhat.  He has not been hospitalized in our facility for almost 2 years.    Denies wheezing, headaches, chest pain,  hemoptysis. Denies fevers, chills and night sweats. He is able to perform ADLs without difficulties and denies any swollen glands/lymph nodes in the head or  neck.          History reviewed. No pertinent family history.     Social History     Socioeconomic History    Marital status:    Tobacco Use    Smoking status: Former     Current packs/day: 0.00     Average packs/day: 3.0 packs/day for 40.0 years (120.0 ttl pk-yrs)     Types: Cigarettes     Start date: 1/31/1982     Quit date: 1/31/2022     Years since quitting: 3.0     Passive exposure: Current    Smokeless tobacco: Never   Vaping Use    Vaping status: Never Used   Substance and Sexual Activity    Alcohol use: Never    Drug use: Never    Sexual activity: Defer        Past Medical History:   Diagnosis Date    Chronic diarrhea     COPD (chronic obstructive pulmonary disease)     CVA (cerebral vascular accident) 01/2023    Diabetes     TYPE 1/ on insulin pump    Histoplasmosis     on oxygen. pt see Dr. Mahesh riley and infectious dz md    Hypertension     Oxygen dependent         Immunization History   Administered Date(s) Administered    FLUAD TRI 65YR+ 08/29/2024    Influenza Injectable Mdck Pf Quad 09/12/2023         No Known Allergies       Current Outpatient Medications:     aspirin 81 MG chewable tablet, Chew 1 tablet Daily., Disp: , Rfl:     benzonatate (TESSALON) 200 MG capsule, Take 1 capsule by mouth 3 (Three) Times a Day As Needed for Cough., Disp: 30 capsule, Rfl: 4    Breztri Aerosphere 160-9-4.8 MCG/ACT aerosol inhaler, Inhale 2 puffs 2 (Two) Times a Day., Disp: 1 each, Rfl: 11    citalopram (CeleXA) 20 MG tablet, Take 1 tablet by mouth Daily., Disp: , Rfl:     cloNIDine (CATAPRES) 0.1 MG tablet, 1 tab orally once a day (at bedtime) as needed for BP >160/90 for 90 days, Disp: , Rfl:     ibuprofen (ADVIL,MOTRIN) 200 MG tablet, Take 2 tablets by mouth Every 6 (Six) Hours As Needed for Mild Pain., Disp: , Rfl:     Insulin Disposable Pump (Omnipod 5 [id number] Pods Gen 5) misc, 1 EVERY 72 HOURS, Disp: , Rfl:     ipratropium-albuterol (DUO-NEB) 0.5-2.5 mg/3 ml nebulizer, Every 6 (Six) Hours., Disp:  ", Rfl:     irbesartan (AVAPRO) 150 MG tablet, Take 1 tablet by mouth Daily., Disp: , Rfl:     NovoLOG 100 UNIT/ML injection, USE PER SLIDING SCALE UP TO 70 UNITS DAILY, Disp: , Rfl:     rosuvastatin (Crestor) 20 MG tablet, Take 1 tablet by mouth Daily for 30 days., Disp: 30 tablet, Rfl: 0    traZODone (DESYREL) 50 MG tablet, Take 1 tablet by mouth As Needed. Unsure of dose, pt spouse to call pharmacy to clarify, Disp: , Rfl:     albuterol (PROVENTIL) (2.5 MG/3ML) 0.083% nebulizer solution, Take 2.5 mg by nebulization Every 4 (Four) Hours As Needed for Wheezing or Shortness of Air. (Patient not taking: Reported on 1/29/2025), Disp: 300 mL, Rfl: 0    Insulin Lispro (humaLOG) 100 UNIT/ML injection, Inject 75 Units under the skin into the appropriate area as directed Daily. Type of Insulin: Humalog 100 unit/ml Basal Dose:  Bolus Dose:  Prescriber Jayne Montoya Phone number [phone] Next refill 1/3/22 Refilled every 3 days only 95 units left (Patient not taking: Reported on 1/29/2025), Disp: , Rfl:     lisinopril (PRINIVIL,ZESTRIL) 20 MG tablet, Take 1 tablet by mouth 2 (Two) Times a Day. (Patient not taking: Reported on 1/29/2025), Disp: , Rfl:     oxyCODONE-acetaminophen (Percocet) 5-325 MG per tablet, Take 1 tablet by mouth Every 6 (Six) Hours As Needed for Severe Pain. (Patient not taking: Reported on 1/29/2025), Disp: 20 tablet, Rfl: 0    predniSONE (DELTASONE) 10 MG tablet, Take 4 tabs daily x 3 days, then take 3 tabs daily x 3 days, then take 2 tabs daily x 3 days, then take 1 tab daily x 3 days, Disp: 31 tablet, Rfl: 0         Objective     Vital Signs:   /89 (BP Location: Left arm, Patient Position: Sitting, Cuff Size: Adult)   Pulse 90   Temp 98.6 °F (37 °C) (Oral)   Resp 16   Ht 170.2 cm (67\")   Wt 62.1 kg (136 lb 12.8 oz)   SpO2 94% Comment: 1L PD Uses 3L at home, 5L with ambulation  BMI 21.43 kg/m²     Physical Exam  Vital Signs Reviewed   Thin chronically ill-appearing male, alert, no apparent " distress  HEENT:  PERRL, EOMI.  OP, nares clear  Neck:  Supple, no JVD, no thyromegaly  Chest: Barrel chested, poor aeration, rhonchi bilaterally, tympanic to percussion bilaterally, pursed lip breathing noted  CV: RRR, no MGR, pulses 2+, equal.  Abd:  Soft, NT, ND, + BS, no HSM  EXT:  no clubbing, no cyanosis, no edema  Neuro:  A&Ox3, CN grossly intact, no focal deficits.  Skin: No rashes or lesions noted       Result Review :   I have personally reviewed the office notes from referring provider, [facility] pulmonary and infectious disease.  2022 PFTs personally viewed showing severe airflow obstruction with FEV1 around 40% predicted, DLCO 44% predicted.  Personally reviewed 2024 chest CT showing no pulmonary embolism.  Does have a large right upper lobe cavity.  Has multiple lung nodules with cavitation unchanged potentially compared to previous imaging.  Also has significant emphysematous changes.  CBC personally reviewed showing 200 peripheral eosinophils.  CMP showed elevated serum bicarbonate which could potentially suggest chronic hypercapnia.         Assessment and Plan        * No active hospital problems. *      Impression:  Severe COPD with acute exacerbation.  On Breztri plus albuterol as needed  Chronic hypoxemic respiratory failure on 3 to 4 L/min of oxygen  Chronic pulmonary histoplasmosis.  Supposed be on chronic itraconazole is not been on it in over 8 months  Abnormal chest CT.  Large right upper lobe cavity and multiple lung nodules.  Consistent with patient's histoplasmosis infection.  Unintentional weight loss  Chronic dyspnea  Chronic cough  Tobacco use of cigarettes in remission    Plan:  This patient's unintentional weight loss and worsening fatigue and cough and stopping itraconazole, I am worried that chronic cavitary histoplasmosis may be causing a lot of the symptoms.  Given his CT findings could also have underlying mycobacterial infection or pulmonary malignancy given the multitude of lung  nodules.  I think prior to reinitiating any sort of therapy, proceeding with bronchoscopy with biopsy of a couple of these nodules for tissue diagnosis would be beneficial.  Will take patient for robotic navigational bronchoscopy as well as bronchoscopy with endobronchial ultrasound/fine-needle aspiration. I have discussed the benefits vs risks of the procedure with the patient including pneumothorax, hemothorax, bleeding, hypoxia, required mechanical ventilation and death. The patient recognizes these findings, acknowledges these findings and is agreeable to the procedure.  Consider reinitiating chronic itraconazole therapy 200 mg twice a day at next follow-up visit depending on biopsy results  Check full pulmonary function test  to assess for airflow obstruction, bronchodilator response  Will give prednisone burst  Continue Breztri plus albuterol as needed  Continue 3 to 4 L of oxygen to keep SpO2 between 8892%  Smoking status:former smoker  Vaccination status: Up-to-date with flu vaccine.  Defers pneumonia vaccine at this time  Medications personally reviewed    Follow Up   Return in about 3 weeks (around 2/19/2025).  Patient was given instructions and counseling regarding his condition or for health maintenance advice. Please see specific information pulled into the AVS if appropriate.     Electronically signed by Patrick Sewell MD, 01/30/25, 7:53 AM EST.

## 2025-01-30 NOTE — H&P (VIEW-ONLY)
Primary Care Provider  Mana Stafford APRN   Referring Provider  ROGE Nath      Patient Complaint  Establish Care, Lung Nodule, Shortness of Breath (Constant ), Cough (Productive, gray), Wheezing, Chest Tightness, and Fatigue      Subjective          Tyrone Garcia presents to [facility] PULMONARY & CRITICAL CARE MEDICINE      History of Presenting Illness  Tyrone Garcia is a 64 y.o. male here to establish care.  Previously was seen by us during hospitalizations in 2023.  Was seeing [facility] pulmonary as well as infectious disease for chronic cavitary histoplasmosis.  Patient has a history of severe COPD with FEV1 of 40% in 2022, multiple lung nodules and a large right cavitary lesion which was positive for histoplasmosis on bronchoscopy number years ago.  He is here to establish care here as he lives closer to here and would like to see pulmonary closer to here.  He has not seen ID or pulmonary in quite some time.  He has not been on itraconazole for over 8 months after previously being on it for almost 2 years.  He wears 3 to 4 L of oxygen continuously at baseline.  He is a former cigarette smoker of 90 pack years and quit smoking about 5 years ago.  Over the last couple months he reports 10 to 15 pounds of unintentional weight loss, worsening fatigue and feeling poorly.  Has increased hacking cough productive of thick cloudy secretions and yellow secretions.  He gets short of breath very easily, for example going up a flight of steps.  Dyspnea is moderate severity, worse with activity and relieved with rest.  He does take albuterol and Breztri which has helped somewhat.  He has not been hospitalized in our facility for almost 2 years.    Denies wheezing, headaches, chest pain,  hemoptysis. Denies fevers, chills and night sweats. He is able to perform ADLs without difficulties and denies any swollen glands/lymph nodes in the head or  neck.          History reviewed. No pertinent family history.     Social History     Socioeconomic History    Marital status:    Tobacco Use    Smoking status: Former     Current packs/day: 0.00     Average packs/day: 3.0 packs/day for 40.0 years (120.0 ttl pk-yrs)     Types: Cigarettes     Start date: 1/31/1982     Quit date: 1/31/2022     Years since quitting: 3.0     Passive exposure: Current    Smokeless tobacco: Never   Vaping Use    Vaping status: Never Used   Substance and Sexual Activity    Alcohol use: Never    Drug use: Never    Sexual activity: Defer        Past Medical History:   Diagnosis Date    Chronic diarrhea     COPD (chronic obstructive pulmonary disease)     CVA (cerebral vascular accident) 01/2023    Diabetes     TYPE 1/ on insulin pump    Histoplasmosis     on oxygen. pt see Dr. Mahesh riley and infectious dz md    Hypertension     Oxygen dependent         Immunization History   Administered Date(s) Administered    FLUAD TRI 65YR+ 08/29/2024    Influenza Injectable Mdck Pf Quad 09/12/2023         No Known Allergies       Current Outpatient Medications:     aspirin 81 MG chewable tablet, Chew 1 tablet Daily., Disp: , Rfl:     benzonatate (TESSALON) 200 MG capsule, Take 1 capsule by mouth 3 (Three) Times a Day As Needed for Cough., Disp: 30 capsule, Rfl: 4    Breztri Aerosphere 160-9-4.8 MCG/ACT aerosol inhaler, Inhale 2 puffs 2 (Two) Times a Day., Disp: 1 each, Rfl: 11    citalopram (CeleXA) 20 MG tablet, Take 1 tablet by mouth Daily., Disp: , Rfl:     cloNIDine (CATAPRES) 0.1 MG tablet, 1 tab orally once a day (at bedtime) as needed for BP >160/90 for 90 days, Disp: , Rfl:     ibuprofen (ADVIL,MOTRIN) 200 MG tablet, Take 2 tablets by mouth Every 6 (Six) Hours As Needed for Mild Pain., Disp: , Rfl:     Insulin Disposable Pump (Omnipod 5 [id number] Pods Gen 5) misc, 1 EVERY 72 HOURS, Disp: , Rfl:     ipratropium-albuterol (DUO-NEB) 0.5-2.5 mg/3 ml nebulizer, Every 6 (Six) Hours., Disp:  ", Rfl:     irbesartan (AVAPRO) 150 MG tablet, Take 1 tablet by mouth Daily., Disp: , Rfl:     NovoLOG 100 UNIT/ML injection, USE PER SLIDING SCALE UP TO 70 UNITS DAILY, Disp: , Rfl:     rosuvastatin (Crestor) 20 MG tablet, Take 1 tablet by mouth Daily for 30 days., Disp: 30 tablet, Rfl: 0    traZODone (DESYREL) 50 MG tablet, Take 1 tablet by mouth As Needed. Unsure of dose, pt spouse to call pharmacy to clarify, Disp: , Rfl:     albuterol (PROVENTIL) (2.5 MG/3ML) 0.083% nebulizer solution, Take 2.5 mg by nebulization Every 4 (Four) Hours As Needed for Wheezing or Shortness of Air. (Patient not taking: Reported on 1/29/2025), Disp: 300 mL, Rfl: 0    Insulin Lispro (humaLOG) 100 UNIT/ML injection, Inject 75 Units under the skin into the appropriate area as directed Daily. Type of Insulin: Humalog 100 unit/ml Basal Dose:  Bolus Dose:  Prescriber Jayne Montoya Phone number [phone] Next refill 1/3/22 Refilled every 3 days only 95 units left (Patient not taking: Reported on 1/29/2025), Disp: , Rfl:     lisinopril (PRINIVIL,ZESTRIL) 20 MG tablet, Take 1 tablet by mouth 2 (Two) Times a Day. (Patient not taking: Reported on 1/29/2025), Disp: , Rfl:     oxyCODONE-acetaminophen (Percocet) 5-325 MG per tablet, Take 1 tablet by mouth Every 6 (Six) Hours As Needed for Severe Pain. (Patient not taking: Reported on 1/29/2025), Disp: 20 tablet, Rfl: 0    predniSONE (DELTASONE) 10 MG tablet, Take 4 tabs daily x 3 days, then take 3 tabs daily x 3 days, then take 2 tabs daily x 3 days, then take 1 tab daily x 3 days, Disp: 31 tablet, Rfl: 0         Objective     Vital Signs:   /89 (BP Location: Left arm, Patient Position: Sitting, Cuff Size: Adult)   Pulse 90   Temp 98.6 °F (37 °C) (Oral)   Resp 16   Ht 170.2 cm (67\")   Wt 62.1 kg (136 lb 12.8 oz)   SpO2 94% Comment: 1L PD Uses 3L at home, 5L with ambulation  BMI 21.43 kg/m²     Physical Exam  Vital Signs Reviewed   Thin chronically ill-appearing male, alert, no apparent " distress  HEENT:  PERRL, EOMI.  OP, nares clear  Neck:  Supple, no JVD, no thyromegaly  Chest: Barrel chested, poor aeration, rhonchi bilaterally, tympanic to percussion bilaterally, pursed lip breathing noted  CV: RRR, no MGR, pulses 2+, equal.  Abd:  Soft, NT, ND, + BS, no HSM  EXT:  no clubbing, no cyanosis, no edema  Neuro:  A&Ox3, CN grossly intact, no focal deficits.  Skin: No rashes or lesions noted       Result Review :   I have personally reviewed the office notes from referring provider, [facility] pulmonary and infectious disease.  2022 PFTs personally viewed showing severe airflow obstruction with FEV1 around 40% predicted, DLCO 44% predicted.  Personally reviewed 2024 chest CT showing no pulmonary embolism.  Does have a large right upper lobe cavity.  Has multiple lung nodules with cavitation unchanged potentially compared to previous imaging.  Also has significant emphysematous changes.  CBC personally reviewed showing 200 peripheral eosinophils.  CMP showed elevated serum bicarbonate which could potentially suggest chronic hypercapnia.         Assessment and Plan        * No active hospital problems. *      Impression:  Severe COPD with acute exacerbation.  On Breztri plus albuterol as needed  Chronic hypoxemic respiratory failure on 3 to 4 L/min of oxygen  Chronic pulmonary histoplasmosis.  Supposed be on chronic itraconazole is not been on it in over 8 months  Abnormal chest CT.  Large right upper lobe cavity and multiple lung nodules.  Consistent with patient's histoplasmosis infection.  Unintentional weight loss  Chronic dyspnea  Chronic cough  Tobacco use of cigarettes in remission    Plan:  This patient's unintentional weight loss and worsening fatigue and cough and stopping itraconazole, I am worried that chronic cavitary histoplasmosis may be causing a lot of the symptoms.  Given his CT findings could also have underlying mycobacterial infection or pulmonary malignancy given the multitude of lung  nodules.  I think prior to reinitiating any sort of therapy, proceeding with bronchoscopy with biopsy of a couple of these nodules for tissue diagnosis would be beneficial.  Will take patient for robotic navigational bronchoscopy as well as bronchoscopy with endobronchial ultrasound/fine-needle aspiration. I have discussed the benefits vs risks of the procedure with the patient including pneumothorax, hemothorax, bleeding, hypoxia, required mechanical ventilation and death. The patient recognizes these findings, acknowledges these findings and is agreeable to the procedure.  Consider reinitiating chronic itraconazole therapy 200 mg twice a day at next follow-up visit depending on biopsy results  Check full pulmonary function test  to assess for airflow obstruction, bronchodilator response  Will give prednisone burst  Continue Breztri plus albuterol as needed  Continue 3 to 4 L of oxygen to keep SpO2 between 8892%  Smoking status:former smoker  Vaccination status: Up-to-date with flu vaccine.  Defers pneumonia vaccine at this time  Medications personally reviewed    Follow Up   Return in about 3 weeks (around 2/19/2025).  Patient was given instructions and counseling regarding his condition or for health maintenance advice. Please see specific information pulled into the AVS if appropriate.     Electronically signed by Patrick Sewell MD, 01/30/25, 7:53 AM EST.

## 2025-01-31 NOTE — PRE-PROCEDURE INSTRUCTIONS
IMPORTANT INSTRUCTIONS - PRE-ADMISSION TESTING  DO NOT EAT OR CHEW anything after midnight the night before your procedure.    NPO AFTER MN EXCEPT SIPS OF WATER TO TAKE MEDICATIONS LISTED BELOW  Take the following medications the morning of your procedure with JUST A SIP OF WATER:  __ALBUTEROL NEB IF NEEDED , TESSALON IF NEEDED, USE BREZTRI INHALER AND BRING WITH YOU, CITALOPRAM, DECREASE INSULIN PUMP BY 10% IF UNABLE TO DO THAT THEN WEAR INSULIN PUMP IN AND ANESTHESIA WILL DIRECT YOU, USE DUONEB, PREDNISONE, ROSUVASTATIN_____________________________________________________________________________________________________________________________________________________________________________________    DO NOT BRING your medications to the hospital with you, UNLESS something has changed since your PRE-Admission Testing appointment.  Hold all vitamins, supplements, and NSAIDS (Non- steroidal anti-inflammatory meds) for one week prior to surgery (you MAY take Tylenol or Acetaminophen).  If you are diabetic, check your blood sugar the morning of your procedure. If it is less than 70 or if you are feeling symptomatic, call the following number for further instructions: [phone] ______.  Use your inhalers/nebulizers as usual, the morning of your procedure. BRING YOUR INHALERS with you.   Bring your CPAP or BIPAP to hospital, ONLY IF YOU WILL BE SPENDING THE NIGHT.   Make sure you have a ride home and have someone who will stay with you the day of your procedure after you go home.  If you have any questions, please call your Pre-Admission Testing Nurse, ___PARI_____________ at [phone]____________.   Per anesthesia request, do not smoke for 24 hours before your procedure or as instructed by your surgeon.    WILL CALL ON 1/31/25        NORMALLY BETWEEN 1 AND 4 PM TO GIVE OFFICIAL ARRIVAL TIME FOR DAY OF PROCEDURE  BATHING INSTRUCTIONS GIVEN. NO JEWELRY OF ANY TYPE   COME TO [facility] [address] DAY OF  PROCEDURE. GET ON ELEVATOR AND COME TO FIRST FLOOR TAKE LEFT OFF OF ELEVATOR.   PT REPORTS HAD ALREADY STOPPED HIS ASA AS INSTRUCTED BY DR ALEX

## 2025-02-03 ENCOUNTER — APPOINTMENT (OUTPATIENT)
Dept: GENERAL RADIOLOGY | Facility: HOSPITAL | Age: 65
End: 2025-02-03
Payer: MEDICARE

## 2025-02-03 ENCOUNTER — ANESTHESIA EVENT (OUTPATIENT)
Dept: PERIOP | Facility: HOSPITAL | Age: 65
End: 2025-02-03
Payer: MEDICARE

## 2025-02-03 ENCOUNTER — ANESTHESIA (OUTPATIENT)
Dept: PERIOP | Facility: HOSPITAL | Age: 65
End: 2025-02-03
Payer: MEDICARE

## 2025-02-03 ENCOUNTER — HOSPITAL ENCOUNTER (OUTPATIENT)
Facility: HOSPITAL | Age: 65
Setting detail: HOSPITAL OUTPATIENT SURGERY
Discharge: HOME | End: 2025-02-03
Attending: INTERNAL MEDICINE | Admitting: INTERNAL MEDICINE
Payer: MEDICARE

## 2025-02-03 VITALS
HEIGHT: 67 IN | HEART RATE: 92 BPM | BODY MASS INDEX: 21.45 KG/M2 | TEMPERATURE: 98 F | OXYGEN SATURATION: 95 % | DIASTOLIC BLOOD PRESSURE: 67 MMHG | WEIGHT: 136.69 LBS | SYSTOLIC BLOOD PRESSURE: 141 MMHG | RESPIRATION RATE: 20 BRPM

## 2025-02-03 DIAGNOSIS — J43.2 CENTRILOBULAR EMPHYSEMA: ICD-10-CM

## 2025-02-03 DIAGNOSIS — R63.4 UNINTENDED WEIGHT LOSS: ICD-10-CM

## 2025-02-03 DIAGNOSIS — R05.3 CHRONIC COUGH: ICD-10-CM

## 2025-02-03 DIAGNOSIS — R91.8 MULTIPLE LUNG NODULES: ICD-10-CM

## 2025-02-03 DIAGNOSIS — R06.09 DOE (DYSPNEA ON EXERTION): ICD-10-CM

## 2025-02-03 DIAGNOSIS — B39.1 HISTOPLASMOSIS, CHRONIC CAVITARY: ICD-10-CM

## 2025-02-03 PROBLEM — T17.500A MUCUS PLUGGING OF BRONCHI: Status: ACTIVE | Noted: 2025-02-03

## 2025-02-03 LAB
ACB CMPLX DNA BAL NAA+NON-PRB-NCNCRNG: NOT DETECTED
BLACTX-M ISLT/SPM QL: NORMAL
BLAIMP ISLT/SPM QL: NORMAL
BLAKPC ISLT/SPM QL: NORMAL
BLAOXA-48-LIKE ISLT/SPM QL: NORMAL
BLAVIM ISLT/SPM QL: NORMAL
C PNEUM DNA NPH QL NAA+NON-PROBE: NOT DETECTED
CILIATED BAL QL: 18 %
E CLOAC COMP DNA BAL NAA+NON-PRB-NCNCRNG: NOT DETECTED
E COLI DNA BAL NAA+NON-PRB-NCNCRNG: NOT DETECTED
EOSINOPHIL NFR FLD MANUAL: 2 %
FLUAV SUBTYP SPEC NAA+PROBE: NOT DETECTED
FLUBV RNA ISLT QL NAA+PROBE: NOT DETECTED
GLUCOSE BLDC GLUCOMTR-MCNC: 143 MG/DL (ref 70–99)
GLUCOSE BLDC GLUCOMTR-MCNC: 250 MG/DL (ref 70–99)
GP B STREP DNA BAL NAA+NON-PRB-NCNCRNG: NOT DETECTED
GRAM STN SPEC: NORMAL
GRAM STN SPEC: NORMAL
HADV DNA SPEC NAA+PROBE: NOT DETECTED
HAEM INFLU DNA BAL NAA+NON-PRB-NCNCRNG: NOT DETECTED
HCOV RNA LOWER RESP QL NAA+NON-PROBE: NOT DETECTED
HMPV RNA NPH QL NAA+NON-PROBE: NOT DETECTED
HPIV RNA LOWER RESP QL NAA+NON-PROBE: NOT DETECTED
K AEROGENES DNA BAL NAA+NON-PRB-NCNCRNG: NOT DETECTED
K OXYTOCA DNA BAL NAA+NON-PRB-NCNCRNG: NOT DETECTED
K PNEU GRP DNA BAL NAA+NON-PRB-NCNCRNG: NOT DETECTED
L PNEUMO DNA LOWER RESP QL NAA+NON-PROBE: NOT DETECTED
LYMPHOCYTES NFR FLD MANUAL: 37 %
M CATARRHALIS DNA BAL NAA+NON-PRB-NCNCRNG: NOT DETECTED
M PNEUMO IGG SER IA-ACNC: NOT DETECTED
MACROPHAGE FLUID %: 17 %
MECA+MECC ISLT/SPM QL: NORMAL
NDM GENE: NORMAL
NEUTROPHILS NFR FLD MANUAL: 26 %
NIGHT BLUE STAIN TISS: NORMAL
P AERUGINOSA DNA BAL NAA+NON-PRB-NCNCRNG: NOT DETECTED
PROTEUS SP DNA BAL NAA+NON-PRB-NCNCRNG: NOT DETECTED
RHINOVIRUS RNA SPEC NAA+PROBE: NOT DETECTED
RSV RNA NPH QL NAA+NON-PROBE: NOT DETECTED
S AUREUS DNA BAL NAA+NON-PRB-NCNCRNG: NOT DETECTED
S MARCESCENS DNA BAL NAA+NON-PRB-NCNCRNG: NOT DETECTED
S PNEUM DNA BAL NAA+NON-PRB-NCNCRNG: NOT DETECTED
S PYO DNA BAL NAA+NON-PRB-NCNCRNG: NOT DETECTED
VISUAL PRESENCE OF BLOOD: NORMAL

## 2025-02-03 PROCEDURE — 25010000002 SUGAMMADEX 200 MG/2ML SOLUTION: Performed by: ANESTHESIOLOGY

## 2025-02-03 PROCEDURE — 88104 CYTOPATH FL NONGYN SMEARS: CPT | Performed by: INTERNAL MEDICINE

## 2025-02-03 PROCEDURE — 25010000002 MIDAZOLAM PER 1MG: Performed by: ANESTHESIOLOGY

## 2025-02-03 PROCEDURE — 31645 BRNCHSC W/THER ASPIR 1ST: CPT | Performed by: INTERNAL MEDICINE

## 2025-02-03 PROCEDURE — 87205 SMEAR GRAM STAIN: CPT | Performed by: INTERNAL MEDICINE

## 2025-02-03 PROCEDURE — 87206 SMEAR FLUORESCENT/ACID STAI: CPT | Performed by: INTERNAL MEDICINE

## 2025-02-03 PROCEDURE — 88341 IMHCHEM/IMCYTCHM EA ADD ANTB: CPT | Performed by: INTERNAL MEDICINE

## 2025-02-03 PROCEDURE — 87071 CULTURE AEROBIC QUANT OTHER: CPT | Performed by: INTERNAL MEDICINE

## 2025-02-03 PROCEDURE — 31627 NAVIGATIONAL BRONCHOSCOPY: CPT | Performed by: INTERNAL MEDICINE

## 2025-02-03 PROCEDURE — 25010000002 PROPOFOL 10 MG/ML EMULSION: Performed by: ANESTHESIOLOGY

## 2025-02-03 PROCEDURE — 88173 CYTOPATH EVAL FNA REPORT: CPT | Performed by: INTERNAL MEDICINE

## 2025-02-03 PROCEDURE — 25010000002 HYDROCORTISONE SOD SUC (PF) 100 MG RECONSTITUTED SOLUTION: Performed by: ANESTHESIOLOGY

## 2025-02-03 PROCEDURE — 88305 TISSUE EXAM BY PATHOLOGIST: CPT | Performed by: INTERNAL MEDICINE

## 2025-02-03 PROCEDURE — 25810000003 LACTATED RINGERS PER 1000 ML: Performed by: ANESTHESIOLOGY

## 2025-02-03 PROCEDURE — 87116 MYCOBACTERIA CULTURE: CPT | Performed by: INTERNAL MEDICINE

## 2025-02-03 PROCEDURE — 89051 BODY FLUID CELL COUNT: CPT | Performed by: INTERNAL MEDICINE

## 2025-02-03 PROCEDURE — 82948 REAGENT STRIP/BLOOD GLUCOSE: CPT | Performed by: NURSE ANESTHETIST, CERTIFIED REGISTERED

## 2025-02-03 PROCEDURE — 88108 CYTOPATH CONCENTRATE TECH: CPT | Performed by: INTERNAL MEDICINE

## 2025-02-03 PROCEDURE — 87102 FUNGUS ISOLATION CULTURE: CPT | Performed by: INTERNAL MEDICINE

## 2025-02-03 PROCEDURE — 25010000002 LIDOCAINE PF 2% 2 % SOLUTION: Performed by: ANESTHESIOLOGY

## 2025-02-03 PROCEDURE — 88342 IMHCHEM/IMCYTCHM 1ST ANTB: CPT | Performed by: INTERNAL MEDICINE

## 2025-02-03 PROCEDURE — 31623 DX BRONCHOSCOPE/BRUSH: CPT | Performed by: INTERNAL MEDICINE

## 2025-02-03 PROCEDURE — 31654 BRONCH EBUS IVNTJ PERPH LES: CPT | Performed by: INTERNAL MEDICINE

## 2025-02-03 PROCEDURE — C1726 CATH, BAL DIL, NON-VASCULAR: HCPCS | Performed by: INTERNAL MEDICINE

## 2025-02-03 PROCEDURE — 31624 DX BRONCHOSCOPE/LAVAGE: CPT | Performed by: INTERNAL MEDICINE

## 2025-02-03 PROCEDURE — 82948 REAGENT STRIP/BLOOD GLUCOSE: CPT | Performed by: ANESTHESIOLOGY

## 2025-02-03 PROCEDURE — 25010000002 ONDANSETRON PER 1 MG: Performed by: ANESTHESIOLOGY

## 2025-02-03 PROCEDURE — 76000 FLUOROSCOPY <1 HR PHYS/QHP: CPT

## 2025-02-03 PROCEDURE — 88312 SPECIAL STAINS GROUP 1: CPT | Performed by: INTERNAL MEDICINE

## 2025-02-03 PROCEDURE — 25010000002 FENTANYL CITRATE (PF) 50 MCG/ML SOLUTION: Performed by: ANESTHESIOLOGY

## 2025-02-03 PROCEDURE — 87633 RESP VIRUS 12-25 TARGETS: CPT | Performed by: INTERNAL MEDICINE

## 2025-02-03 PROCEDURE — 31628 BRONCHOSCOPY/LUNG BX EACH: CPT | Performed by: INTERNAL MEDICINE

## 2025-02-03 PROCEDURE — 31629 BRONCHOSCOPY/NEEDLE BX EACH: CPT | Performed by: INTERNAL MEDICINE

## 2025-02-03 RX ORDER — IPRATROPIUM BROMIDE AND ALBUTEROL SULFATE 2.5; .5 MG/3ML; MG/3ML
3 SOLUTION RESPIRATORY (INHALATION) ONCE AS NEEDED
Status: DISCONTINUED | OUTPATIENT
Start: 2025-02-03 | End: 2025-02-03 | Stop reason: HOSPADM

## 2025-02-03 RX ORDER — PROPOFOL 10 MG/ML
VIAL (ML) INTRAVENOUS AS NEEDED
Status: DISCONTINUED | OUTPATIENT
Start: 2025-02-03 | End: 2025-02-03 | Stop reason: SURG

## 2025-02-03 RX ORDER — LIDOCAINE HYDROCHLORIDE 20 MG/ML
INJECTION, SOLUTION EPIDURAL; INFILTRATION; INTRACAUDAL; PERINEURAL AS NEEDED
Status: DISCONTINUED | OUTPATIENT
Start: 2025-02-03 | End: 2025-02-03 | Stop reason: SURG

## 2025-02-03 RX ORDER — PHENYLEPHRINE HCL IN 0.9% NACL 1 MG/10 ML
SYRINGE (ML) INTRAVENOUS AS NEEDED
Status: DISCONTINUED | OUTPATIENT
Start: 2025-02-03 | End: 2025-02-03 | Stop reason: SURG

## 2025-02-03 RX ORDER — ONDANSETRON 2 MG/ML
INJECTION INTRAMUSCULAR; INTRAVENOUS AS NEEDED
Status: DISCONTINUED | OUTPATIENT
Start: 2025-02-03 | End: 2025-02-03 | Stop reason: SURG

## 2025-02-03 RX ORDER — PROMETHAZINE HYDROCHLORIDE 25 MG/1
25 SUPPOSITORY RECTAL ONCE AS NEEDED
Status: DISCONTINUED | OUTPATIENT
Start: 2025-02-03 | End: 2025-02-03 | Stop reason: HOSPADM

## 2025-02-03 RX ORDER — FENTANYL CITRATE 50 UG/ML
INJECTION, SOLUTION INTRAMUSCULAR; INTRAVENOUS AS NEEDED
Status: DISCONTINUED | OUTPATIENT
Start: 2025-02-03 | End: 2025-02-03 | Stop reason: SURG

## 2025-02-03 RX ORDER — OXYCODONE HYDROCHLORIDE 5 MG/1
5 TABLET ORAL
Status: DISCONTINUED | OUTPATIENT
Start: 2025-02-03 | End: 2025-02-03 | Stop reason: HOSPADM

## 2025-02-03 RX ORDER — MIDAZOLAM HYDROCHLORIDE 2 MG/2ML
2 INJECTION, SOLUTION INTRAMUSCULAR; INTRAVENOUS ONCE
Status: COMPLETED | OUTPATIENT
Start: 2025-02-03 | End: 2025-02-03

## 2025-02-03 RX ORDER — ACETAMINOPHEN 500 MG
1000 TABLET ORAL ONCE
Status: COMPLETED | OUTPATIENT
Start: 2025-02-03 | End: 2025-02-03

## 2025-02-03 RX ORDER — SODIUM CHLORIDE, SODIUM LACTATE, POTASSIUM CHLORIDE, CALCIUM CHLORIDE 600; 310; 30; 20 MG/100ML; MG/100ML; MG/100ML; MG/100ML
9 INJECTION, SOLUTION INTRAVENOUS CONTINUOUS PRN
Status: DISCONTINUED | OUTPATIENT
Start: 2025-02-03 | End: 2025-02-03 | Stop reason: HOSPADM

## 2025-02-03 RX ORDER — ROCURONIUM BROMIDE 10 MG/ML
INJECTION, SOLUTION INTRAVENOUS AS NEEDED
Status: DISCONTINUED | OUTPATIENT
Start: 2025-02-03 | End: 2025-02-03 | Stop reason: SURG

## 2025-02-03 RX ORDER — HYDROCORTISONE SODIUM SUCCINATE 100 MG/2ML
100 INJECTION INTRAMUSCULAR; INTRAVENOUS ONCE
Status: COMPLETED | OUTPATIENT
Start: 2025-02-03 | End: 2025-02-03

## 2025-02-03 RX ORDER — PROMETHAZINE HYDROCHLORIDE 25 MG/1
25 TABLET ORAL ONCE AS NEEDED
Status: DISCONTINUED | OUTPATIENT
Start: 2025-02-03 | End: 2025-02-03 | Stop reason: HOSPADM

## 2025-02-03 RX ORDER — KETAMINE HYDROCHLORIDE 10 MG/ML
INJECTION, SOLUTION INTRAMUSCULAR; INTRAVENOUS AS NEEDED
Status: DISCONTINUED | OUTPATIENT
Start: 2025-02-03 | End: 2025-02-03 | Stop reason: SURG

## 2025-02-03 RX ORDER — ONDANSETRON 2 MG/ML
4 INJECTION INTRAMUSCULAR; INTRAVENOUS ONCE AS NEEDED
Status: DISCONTINUED | OUTPATIENT
Start: 2025-02-03 | End: 2025-02-03 | Stop reason: HOSPADM

## 2025-02-03 RX ADMIN — LIDOCAINE HYDROCHLORIDE 70 MG: 20 INJECTION, SOLUTION INTRAVENOUS at 07:40

## 2025-02-03 RX ADMIN — ROCURONIUM BROMIDE 50 MG: 50 INJECTION INTRAVENOUS at 07:40

## 2025-02-03 RX ADMIN — MIDAZOLAM HYDROCHLORIDE 2 MG: 1 INJECTION, SOLUTION INTRAMUSCULAR; INTRAVENOUS at 07:26

## 2025-02-03 RX ADMIN — Medication 100 MCG: at 07:59

## 2025-02-03 RX ADMIN — OXYCODONE 5 MG: 5 TABLET ORAL at 09:33

## 2025-02-03 RX ADMIN — FENTANYL CITRATE 50 MCG: 50 INJECTION, SOLUTION INTRAMUSCULAR; INTRAVENOUS at 07:40

## 2025-02-03 RX ADMIN — KETAMINE HYDROCHLORIDE 10 MG: 10 INJECTION INTRAMUSCULAR; INTRAVENOUS at 07:57

## 2025-02-03 RX ADMIN — ROCURONIUM BROMIDE 20 MG: 50 INJECTION INTRAVENOUS at 08:09

## 2025-02-03 RX ADMIN — Medication 200 MCG: at 08:00

## 2025-02-03 RX ADMIN — KETAMINE HYDROCHLORIDE 20 MG: 10 INJECTION INTRAMUSCULAR; INTRAVENOUS at 07:40

## 2025-02-03 RX ADMIN — PROPOFOL 120 MG: 10 INJECTION, EMULSION INTRAVENOUS at 07:40

## 2025-02-03 RX ADMIN — ACETAMINOPHEN 1000 MG: 500 TABLET ORAL at 07:26

## 2025-02-03 RX ADMIN — Medication 100 MCG: at 07:49

## 2025-02-03 RX ADMIN — ONDANSETRON 4 MG: 2 INJECTION INTRAMUSCULAR; INTRAVENOUS at 07:57

## 2025-02-03 RX ADMIN — KETAMINE HYDROCHLORIDE 20 MG: 10 INJECTION INTRAMUSCULAR; INTRAVENOUS at 08:03

## 2025-02-03 RX ADMIN — Medication 200 MCG: at 08:17

## 2025-02-03 RX ADMIN — SUGAMMADEX 200 MG: 100 INJECTION, SOLUTION INTRAVENOUS at 08:24

## 2025-02-03 RX ADMIN — SODIUM CHLORIDE, POTASSIUM CHLORIDE, SODIUM LACTATE AND CALCIUM CHLORIDE: 600; 310; 30; 20 INJECTION, SOLUTION INTRAVENOUS at 07:37

## 2025-02-03 RX ADMIN — HYDROCORTISONE SODIUM SUCCINATE 100 MG: 100 INJECTION, POWDER, FOR SOLUTION INTRAMUSCULAR; INTRAVENOUS at 07:38

## 2025-02-03 RX ADMIN — PROPOFOL 150 MCG/KG/MIN: 10 INJECTION, EMULSION INTRAVENOUS at 07:42

## 2025-02-03 NOTE — OP NOTE
Robotic navigational assisted bronchoscopy with radial probe endobronchial ultrasound, transbronchial needle aspiration, transbronchial lung biopsies, brushings, bronchoalveolar lavage.  Bronchoscopy with clearance of airways    Pre-Operative Diagnosis: Lung nodules, mucous plugging     Post-Operative Diagnosis: Same     Timeout performed     Anesthesia: General anesthesia     Procedure Details: The patient was consented for the procedure with all risk and benefit of the procedure explained in detail.  He was given the opportunity to ask questions and all concerns were answered. The bronchoscope was inserted into the main airway via the endotracheal tube. An anatomical survey was done of the main airways and the subsegmental bronchus.      Findings: First, using fiberoptic bronchoscope airway inspection was performed.  Endotracheal tube was in adequate position in the mid thoracic trachea.  The trachea was normal in caliber and had no mucosal abnormalities. The left tracheobronchial tree appeared anatomically normal with evidence of bronchiectasis of left upper and lower lobe bronchi. The right tracheobronchial tree appeared anatomically normal with.  Thick viscous cloudy secretions were seen obstructing the left and right lower lobe bronchi.  These were suctioned and removed.  Evidence of bronchiectasis of right upper and lower lobe bronchi all airways were evaluated and cleared.    Next the fiberoptic bronchoscope was removed and Ion robotic navigational bronchoscope was inserted into the endotracheal tube.  Using navigational guidance, we approach to the lung nodule in the left lower lobe of lung.  Identification of left lower lobe lung nodule and appropriate positioning was confirmed via radial probe EBUS and fluoroscopy.  Under fluoroscopic guidance, transbronchial needle aspiration was performed at the lung in the left lower lobe of the lung.  Radial probe EBUS was then inserted to again confirm appropriate  position under fluoroscopic guidance.  Transbronchial lung biopsies were performed under fluoroscopic guidance at the lung in the left lower lobe of the lung. Radial probe EBUS was then inserted to again confirm appropriate position under fluoroscopic guidance.  Brushings  were performed under fluoroscopic guidance at the lung of the left lower lobe bronchus. A bronchoalveolar lavage was performed using 20 mL aliquots of normal saline  instilled into the left lower lobe bronchus then aspirated back. There was 8 mL cloudy reddish fluid in return.  Bronchial washings were collected.    After Ion navigational bronchoscope was removed, linear endobronchial ultrasound was inserted through the endotracheal tube. Using ultrasound, there were no obvious mediastinal lymph nodes amendable to sampling.    Findings:  Identification of left lower lobe lung nodule using radial probe ultrasound and robotic navigation  Bronchiectasis of left and right tracheobronchial trees  Mucous plugging left and right lower lobe bronchus with thick viscous cloudy secretions  No obvious mediastinal or hilar lymph nodes seen on endobronchial ultrasound     Estimated Blood Loss: Less than 10 mL     Specimens:  Transbronchial needle aspiration lung nodule left lower lobe lung  Transbronchial lung biopsies lung nodule left lower lobe of lung  Brushings lung nodule left lower lobe of lung  Bronchoalveolar lavage left lower lobe bronchus     Complications: None; patient tolerated the procedure well.     Disposition: Stable to discharge home.  Follow-up test results.     Patient tolerated the procedure well.    Electronically signed by Patrick Sewell MD, 02/03/25, 8:34 AM EST.

## 2025-02-03 NOTE — DISCHARGE INSTRUCTIONS
DISCHARGE INSTRUCTIONS  BRONCHOSCOPY/  ENDOBRONCHIAL ULTRASOUND      For your procedure you had:  General Anesthesia (you may have a sore throat for the first 24 hours)    You may experience dizziness, drowsiness, or lightheadedness for several hours following surgery/procedure.  Do not stay alone today or tonight.  Limit your activity for 24 hours.  You should not drive or operate machinery or drink alcohol for 24 hours or while you are taking pain medication.  You should not sign legally binding documents.  Resume your diet slowly.  Follow any special dietary instructions you may have been given by your doctor.  NOTIFY YOUR DOCTOR IF YOU EXPERIENCE ANY OF THE FOLLOWING:  Temperature greater than 101 degrees Fahrenheit  Shaking Chills  Redness or excessive drainage from incision  Nausea, vomiting and/or pain that is not controlled by prescribed medications  Increase in bleeding or bleeding that is excessive  Unable to urinate in 6 hours after surgery  If unable to reach your doctor, please go to the closest Emergency Room     Following your bronchoscopy, you may experience a mild sore throat or cough up small amounts of blood.  Extremes of either should be reported to your doctor.  If you have sudden shortness of breath, sense of urgency, chest pain, chest pressure that worsens over time or sharp chest pains that worsen with deep breaths or coughs go the ED or call 911.  Smokers are encouraged not to smoke for 6 to 8 hours after the procedure to decrease the risk of coughing and bleeding.  Nausea and vomiting can occur, but is not a common side effect of the procedure you have had today. If you experience any nausea or vomiting, take clear liquids for your next meal.  Coughing (slight dry cough to hacking cough) is completely expected for 3 to 4 days.  Pink/ blood tinged sputum is expected for several days ( or while coughing)  Notify MD or go to the ED if significant blood is found in the sputum.  Missing your  appointment/follow-up could lead to serious complications.    LAST DOSE OF PAIN MEDICATION:  TYLENOL 1000 MG AT 7:26 AM

## 2025-02-03 NOTE — ANESTHESIA POSTPROCEDURE EVALUATION
Patient: Tyrone Garcia    Procedure Summary       Date: 02/03/25 Room / Location: [facility] OR 01 / [facility] MAIN OR    Anesthesia Start: 0733 Anesthesia Stop: 0839    Procedure: BRONCHOSCOPY WITH ION ROBOT: REBUS, BAL, BRUSHINGS, NEEDLE ASPIRATES, BIOPSIES, insertion of lighted robot navigated instrument to view inside the lung (Bronchus) Diagnosis:       VAUGHN (dyspnea on exertion)      Centrilobular emphysema      Chronic cough      Unintended weight loss      Multiple lung nodules      Histoplasmosis, chronic cavitary      (VAUGHN (dyspnea on exertion) [R06.09])      (Centrilobular emphysema [J43.2])      (Chronic cough [R05.3])      (Unintended weight loss [R63.4])      (Multiple lung nodules [R91.8])      (Histoplasmosis, chronic cavitary [B39.1])    Surgeons: Patrick Sewell MD Provider: Angélica Lopez MD    Anesthesia Type: general ASA Status: 3            Anesthesia Type: general    Vitals  Vitals Value Taken Time   /65 02/03/25 0912   Temp 37.3 °C (99.1 °F) 02/03/25 0900   Pulse 94 02/03/25 0913   Resp 20 02/03/25 0910   SpO2 95 % 02/03/25 0913   Vitals shown include unfiled device data.        Post Anesthesia Care and Evaluation    Patient location during evaluation: bedside  Patient participation: complete - patient participated  Level of consciousness: awake  Pain management: adequate    Airway patency: patent  PONV Status: none  Cardiovascular status: acceptable and stable  Respiratory status: acceptable  Hydration status: acceptable

## 2025-02-03 NOTE — ANESTHESIA PREPROCEDURE EVALUATION
Anesthesia Evaluation     Patient summary reviewed and Nursing notes reviewed   no history of anesthetic complications:   NPO Solid Status: > 8 hours  NPO Liquid Status: > 2 hours           Airway   Mallampati: II  TM distance: >3 FB  Neck ROM: full  No difficulty expected  Dental    (+) edentulous    Pulmonary     breath sounds clear to auscultation  (+) a smoker (2022) Former, cigarettes, COPD (Breztri, duoneb, albuterol (all used in preop)),home oxygen (3-4 L at rest; 5L with activity), shortness of breath, decreased breath sounds    ROS comment: chronic cavitary histoplasmosis, severe COPD, severe airflow restriction with FEV1 of 40%, worsening hacking cough productive of thick cloudy secretions and yellow secretions  Cardiovascular - normal exam  Exercise tolerance: good (4-7 METS)    Rhythm: regular  Rate: normal    (+) hypertension (irbesartan, clonidine) 2 medications or greater, VAUGHN, hyperlipidemia,  carotid artery disease      Neuro/Psych  (+) CVA, numbness  GI/Hepatic/Renal/Endo    (+) diabetes mellitus type 1 poorly controlled using insulin    Musculoskeletal     Abdominal  - normal exam   Substance History - negative use     OB/GYN negative ob/gyn ROS         Other   arthritis,chronic steroid use (stress dose ordered in preop; morning med not taken)      ROS/Med Hx Other: ON PREDNISONE CURRENTLY- [facility]                     Anesthesia Plan    ASA 3     general     intravenous induction     Anesthetic plan, risks, benefits, and alternatives have been provided, discussed and informed consent has been obtained with: patient.    Use of blood products discussed with patient .    Plan discussed with CRNA.        CODE STATUS:

## 2025-02-03 NOTE — INTERVAL H&P NOTE
H&P reviewed. The patient was examined and there are no changes to the H&P.      Electronically signed by Patrick Sewell MD, 02/03/25, 6:55 AM EST.

## 2025-02-05 LAB
BACTERIA SPEC AEROBE CULT: NORMAL
GRAM STN SPEC: NORMAL

## 2025-02-07 ENCOUNTER — TELEPHONE (OUTPATIENT)
Dept: PULMONOLOGY | Facility: CLINIC | Age: 65
End: 2025-02-07
Payer: MEDICARE

## 2025-02-07 LAB
CYTO UR: NORMAL
CYTO UR: NORMAL
LAB AP CASE REPORT: NORMAL
LAB AP CASE REPORT: NORMAL
LAB AP CLINICAL INFORMATION: NORMAL
LAB AP CLINICAL INFORMATION: NORMAL
LAB AP SPECIAL STAINS: NORMAL
LAB AP SPECIAL STAINS: NORMAL
PATH REPORT.FINAL DX SPEC: NORMAL
PATH REPORT.FINAL DX SPEC: NORMAL
PATH REPORT.GROSS SPEC: NORMAL
PATH REPORT.GROSS SPEC: NORMAL

## 2025-02-07 NOTE — TELEPHONE ENCOUNTER
Patient called and would like to have his results of his BX. Please give patient a call, Thank you.

## 2025-02-13 NOTE — PROGRESS NOTES
Primary Care Provider  Mana Stafford APRN     Referring Provider  No ref. provider found     Chief Complaint  Cough, Shortness of Breath, Wheezing, Centrilobular emphysema, and Follow-up (3 week. )    Subjective          History of Presenting Illness  Patient is a 64-year-old male, patient of Dr. Orellana who presents for management of severe COPD and has a history of chronic pulmonary histoplasmosis who presents for a follow-up visit today.  Patient was previously seen by pulmonary during hospitalization in 2023.  Patient was seen in the [facility] pulmonary as well as infectious disease for chronic cavitary histoplasmosis.  Patient does have a history of severe COPD with an FEV1 of 40% in 2022, multiple lung nodules and a large right cavitary lesion which was positive for histoplasmosis on bronchoscopy a number years ago.  Patient was referred to our office to establish care as he lives closer to [place], [place] and did not want to travel.  Patient has not seen infectious disease or pulmonary in quite some time.  Patient has not been on Itraconazole for over 8 months after previously being on it for almost 2 years.  Patient is a former cigarette smoker of 90 pack years and quit smoking about 5 years ago.  Last office visit patient reported that he had a 10 to 15 pound unintentional weight loss, worsening fatigue, and feeling poorly.  Patient also reported having an increase hacking productive cough of thick cloudy secretions yellow secretions. Due to patient reporting unintentional weight loss and worsening fatigue and cough after stopping Itraconazole there is a concern for chronic cavitary histoplasmosis and patient was sent for robotic navigational bronchoscopy as well as bronchoscopy with endobronchial ultrasound/fine-needle aspiration.  Patient had procedure performed on 2/3/2025.  Pneumonia panel came back normal.  AFB cultures and fungal cultures are negative to date.  Cytology came  back negative for malignant cells, however left lower lobe FNA did show some atypical cells.  Tissue pathology showed some rare atypical cells in the left lower lobe, acute and chronic inflammation, focal features suggestive of organizing pneumonia, AFB stain was negative for acid-fast microorganisms, GMS stain was negative for fungal forms.  Patient states that since having bronchoscopy procedure performed his cough has improved.  Patient also reports that he has gained 2 pounds since last office visit.  Patient states that he does get short of breath that is worse with exertion, moderate  in severity, and improved with rest.  Patient states that he is taking Breztri every day as prescribed and uses albuterol inhaler and albuterol nebulizer treatments as needed.  Patient is on oxygen continuously. Patient denies fever, chills, night sweats, swollen glands in the head and neck, unintentional weight loss, hemoptysis, dysphagia, chest pain, palpitations, chest tightness, abdominal pain, nausea, vomiting, and diarrhea.  Patient also denies any myalgias, changes in sense of taste and/or smell, sore throat, any other coronavirus or flu-like symptoms.  Patient denies any leg swelling, orthopnea, paroxysmal nocturnal dyspnea.  Patient is able to perform activities of daily living.        Review of Systems     Family History   Problem Relation Age of Onset    Cancer Father 72        lung        Social History     Socioeconomic History    Marital status:    Tobacco Use    Smoking status: Former     Current packs/day: 0.00     Average packs/day: 3.0 packs/day for 40.0 years (120.0 ttl pk-yrs)     Types: Cigarettes     Start date: 1/31/1982     Quit date: 1/31/2022     Years since quitting: 3.0     Passive exposure: Current    Smokeless tobacco: Never   Vaping Use    Vaping status: Never Used   Substance and Sexual Activity    Alcohol use: Never    Drug use: Never    Sexual activity: Defer        Past Medical History:    Diagnosis Date    Arthritis     Cavitary lesion of lung     RIGHT    Chronic diarrhea     COPD (chronic obstructive pulmonary disease)     CVA (cerebral vascular accident)     2022 REPORTS ONLY RESIDUAL LEFT ARM WEAKNESS    Diabetes     TYPE 1/ on insulin pump    Histoplasmosis     USES 02 3-5 LPM N/C. FOLLOWED BY DR ALEX CURRENTLY.    Hyperlipidemia     Hypertension     Multiple lung nodules     Oxygen dependent     3-5 LPM VIA N/C        Immunization History   Administered Date(s) Administered    FLUAD TRI 65YR+ 08/29/2024    Influenza Injectable Mdck Pf Quad 09/12/2023       No Known Allergies       Current Outpatient Medications:     albuterol (PROVENTIL) (2.5 MG/3ML) 0.083% nebulizer solution, Take 2.5 mg by nebulization Every 4 (Four) Hours As Needed for Wheezing or Shortness of Air for up to 90 days., Disp: 360 each, Rfl: 1    albuterol sulfate  (90 Base) MCG/ACT inhaler, Inhale 2 puffs Every 4 (Four) Hours As Needed for Wheezing for up to 90 days., Disp: 54 g, Rfl: 1    aspirin 81 MG chewable tablet, Chew 1 tablet Daily., Disp: , Rfl:     benzonatate (TESSALON) 200 MG capsule, Take 1 capsule by mouth 3 (Three) Times a Day As Needed for Cough., Disp: 30 capsule, Rfl: 4    Breztri Aerosphere 160-9-4.8 MCG/ACT aerosol inhaler, Inhale 2 puffs 2 (Two) Times a Day for 90 days., Disp: 3 each, Rfl: 1    citalopram (CeleXA) 20 MG tablet, Take 1 tablet by mouth Daily., Disp: , Rfl:     cloNIDine (CATAPRES) 0.1 MG tablet, 1 tab orally once a day (at bedtime) as needed for BP >160/90 for 90 days, Disp: , Rfl:     ibuprofen (ADVIL,MOTRIN) 200 MG tablet, Take 2 tablets by mouth Every 6 (Six) Hours As Needed for Mild Pain., Disp: , Rfl:     Insulin Lispro (humaLOG) 100 UNIT/ML injection, Inject 75 Units under the skin into the appropriate area as directed Daily. Type of Insulin: Humalog 100 unit/ml Basal Dose:  Bolus Dose:  Prescriber Jayne Montoya Phone number [phone] Next refill 1/3/22 Refilled every 3 days  "only 95 units left, Disp: , Rfl:     irbesartan (AVAPRO) 150 MG tablet, Take 1 tablet by mouth Daily., Disp: , Rfl:     rosuvastatin (Crestor) 20 MG tablet, Take 1 tablet by mouth Daily for 30 days., Disp: 30 tablet, Rfl: 0    traZODone (DESYREL) 50 MG tablet, Take 1 tablet by mouth As Needed for Sleep., Disp: , Rfl:     Insulin Disposable Pump (Omnipod 5 [id number] Pods Gen 5) misc, 1 EVERY 72 HOURS, Disp: , Rfl:     predniSONE (DELTASONE) 10 MG tablet, Take 4 tabs daily x 3 days, then take 3 tabs daily x 3 days, then take 2 tabs daily x 3 days, then take 1 tab daily x 3 days (Patient not taking: Reported on 2/19/2025), Disp: 31 tablet, Rfl: 0     Objective     Physical Exam  Vital Signs:   Thin built, Alert, NAD.    HEENT:  PERRL, EOMI.  OP, nares clear, no sinus tenderness  Neck:  Supple, no JVD, no thyromegaly.  Lymph: no axillary, cervical, supraclavicular lymphadenopathy noted bilaterally  Chest: Barrel chested, diminished breath sounds bilaterally. No wheezes, rales, or rhonchi appreciated.  Normal work of breathing noted.  Patient is able speak full sentences without difficulty.  CV: RRR, no MGR, pulses 2+, equal.  Abd:  Soft, NT, ND, + BS, no HSM  EXT:  no clubbing, no cyanosis, no edema, no joint tenderness  Neuro:  A&Ox3, CN grossly intact, no focal deficits.  Skin: No rashes or lesions noted.    /86 (BP Location: Left arm, Patient Position: Sitting, Cuff Size: Large Adult)   Pulse 101   Resp 18   Ht 170.2 cm (67.01\")   Wt 62.8 kg (138 lb 6.4 oz)   SpO2 97% Comment: 3 liters CF  BMI 21.67 kg/m²         Result Review :   I have reviewed Dr. Sewell's last office visit note.  I also reviewed robotic navigational bronchoscopy results dated from 2/3/2025.  See scanned reports.    Procedures:              Assessment and Plan      Assessment:  1. Severe COPD.  On Breztri plus albuterol as needed  2. Chronic hypoxemic respiratory failure on 3 to 4 L/min of oxygen  3. Chronic pulmonary histoplasmosis.  " Currently not on Itraconazole therapy.  4. Abnormal chest CT.  Large right upper lobe cavity and multiple lung nodules. Consistent with patient's histoplasmosis infection.  5. Unintentional weight loss.  Patient reports that he has gained 2 pounds since last office visit.  6. Chronic dyspnea.  7. Chronic cough, improved since bronchoscopy per patient report.  8. Tobacco use of cigarettes in remission.          Plan:  1. Due to patient reporting unintentional weight loss and worsening fatigue and cough after stopping Itraconazole there is a concern for chronic cavitary histoplasmosis and patient was sent for robotic navigational bronchoscopy as well as bronchoscopy with endobronchial ultrasound/fine-needle aspiration.  Patient had procedure performed on 2/3/2025.  Pneumonia panel came back normal.  AFB cultures and fungal cultures are negative to date.  Cytology came back negative for malignant cells, however left lower lobe FNA did show some atypical cells.  Tissue pathology showed some rare atypical cells in the left lower lobe, acute and chronic inflammation, focal features suggestive of organizing pneumonia, AFB stain was negative for acid-fast microorganisms, GMS stain was negative for fungal forms.  Communicated with Dr. Sewell prior to patient's office visit today.  Results are consistent with infection/inflammation and no evidence of malignancy.  Will continue to follow-up until final cultures as this is suspected to be related to his chronic histoplasmosis.  No indication to treat anything at this time as we will follow-up with cultures for the next 6 weeks.  2.  Will repeat chest CT scan again in 3 months.  Order placed today.  3.  Patient is scheduled to have a pulmonary function test on 3/7/2025 at 1:30 PM.  4.  Continue Breztri as prescribed and rinse mouth out after each use.  5.  Continue albuterol inhaler and albuterol nebulizer treatments as needed.  6.  Continue oxygen to keep SPO2 at 89% and  above.  7.  Vaccination status:  patient reports they are up-to-date with flu vaccine.  Patient declines pneumonia and COVID-19 vaccinations.  Discussed with patient the benefits of vaccination including decreased risk of severe illness, hospitalization and death related to flu, pneumonia, and COVID-19.  Patient verbalized understanding and will consider getting vaccinated.  Patient is advised to continue to follow CDC recommendations such as social distancing, wearing a mask, and washing hands for least 20 seconds.  8.  Smoking status: Patient is a former cigarette smoker.  9.  Patient to call the office, 911, or go to the ER with new or worsening symptoms.  10.  Follow-up in 6 weeks, sooner if needed.              Follow Up   Return for 6 weeks with Dr. Sewell.  Patient was given instructions and counseling regarding his condition or for health maintenance advice. Please see specific information pulled into the AVS if appropriate.

## 2025-02-19 ENCOUNTER — OFFICE VISIT (OUTPATIENT)
Dept: PULMONOLOGY | Facility: CLINIC | Age: 65
End: 2025-02-19
Payer: MEDICARE

## 2025-02-19 VITALS
BODY MASS INDEX: 21.72 KG/M2 | SYSTOLIC BLOOD PRESSURE: 128 MMHG | RESPIRATION RATE: 18 BRPM | WEIGHT: 138.4 LBS | HEART RATE: 101 BPM | HEIGHT: 67 IN | DIASTOLIC BLOOD PRESSURE: 86 MMHG | OXYGEN SATURATION: 97 %

## 2025-02-19 DIAGNOSIS — F17.201 TOBACCO ABUSE, IN REMISSION: ICD-10-CM

## 2025-02-19 DIAGNOSIS — Z86.19 HISTORY OF HISTOPLASMOSIS: ICD-10-CM

## 2025-02-19 DIAGNOSIS — R06.09 CHRONIC DYSPNEA: ICD-10-CM

## 2025-02-19 DIAGNOSIS — R05.3 CHRONIC COUGH: ICD-10-CM

## 2025-02-19 DIAGNOSIS — J96.11 CHRONIC HYPOXEMIC RESPIRATORY FAILURE: ICD-10-CM

## 2025-02-19 DIAGNOSIS — J44.9 CHRONIC OBSTRUCTIVE PULMONARY DISEASE, UNSPECIFIED COPD TYPE: Primary | ICD-10-CM

## 2025-02-19 DIAGNOSIS — R93.89 ABNORMAL CT SCAN, CHEST: ICD-10-CM

## 2025-02-19 RX ORDER — ALBUTEROL SULFATE 90 UG/1
2 INHALANT RESPIRATORY (INHALATION) EVERY 4 HOURS PRN
Qty: 54 G | Refills: 1 | Status: SHIPPED | OUTPATIENT
Start: 2025-02-19 | End: 2025-05-20

## 2025-02-19 RX ORDER — ALBUTEROL SULFATE 0.83 MG/ML
2.5 SOLUTION RESPIRATORY (INHALATION) EVERY 4 HOURS PRN
Qty: 360 EACH | Refills: 1 | Status: SHIPPED | OUTPATIENT
Start: 2025-02-19 | End: 2025-05-20

## 2025-02-19 RX ORDER — ALBUTEROL SULFATE 90 UG/1
2 INHALANT RESPIRATORY (INHALATION) EVERY 4 HOURS PRN
COMMUNITY
End: 2025-02-19 | Stop reason: SDUPTHER

## 2025-02-19 RX ORDER — BUDESONIDE, GLYCOPYRROLATE, AND FORMOTEROL FUMARATE 160; 9; 4.8 UG/1; UG/1; UG/1
2 AEROSOL, METERED RESPIRATORY (INHALATION) 2 TIMES DAILY
Qty: 3 EACH | Refills: 1 | Status: SHIPPED | OUTPATIENT
Start: 2025-02-19 | End: 2025-05-20

## 2025-03-03 LAB — FUNGUS WND CULT: NORMAL

## 2025-03-17 LAB
MYCOBACTERIUM SPEC CULT: NORMAL
NIGHT BLUE STAIN TISS: NORMAL
NIGHT BLUE STAIN TISS: NORMAL

## 2025-03-18 ENCOUNTER — TELEPHONE (OUTPATIENT)
Dept: PULMONOLOGY | Facility: CLINIC | Age: 65
End: 2025-03-18

## 2025-03-18 NOTE — TELEPHONE ENCOUNTER
Spoke to patient, he states while on RA O2 drops to 50-60's, has been wearing O2 daily since Sunday. Informed patient he would need to go the ER for evaluation. Patient verbalized understanding.

## 2025-03-18 NOTE — TELEPHONE ENCOUNTER
Hub staff attempted to follow warm transfer process and was unsuccessful     Caller: Tyrone Garcia    Relationship to patient: Self    Best call back number: [phone]    Patient is needing: PATIENT CALLED STATES HE HAD A HORRIBLE COUGHING FIT ON SUNDAY. IT TAKES ALL HE HAS TO WALK FROM ONE SIDE OF THE ROOM TO THE OTHER WITH HIS OXYGEN ON. PATIENT HAD TO TURN UP HIS OXYGEN FROM A 3 TO WHAT HE CURRENTLY HAS IT SET TO KNOW. SOUNDS SHORT OF BREATH WHEN TALKING AND STILL COUGHING. PLEASE CALL HIM BACK ASAP  WHEN SITTING STILL HIS O2 RUNS 92-95 HOW EVER WHEN PATIENT GETS UP TO MOVE IT DROPS TO 58-62

## 2025-04-24 ENCOUNTER — HOSPITAL ENCOUNTER (OUTPATIENT)
Dept: CT IMAGING | Facility: HOSPITAL | Age: 65
Discharge: HOME | End: 2025-04-24
Admitting: NURSE PRACTITIONER
Payer: MEDICARE

## 2025-04-24 DIAGNOSIS — F17.201 TOBACCO ABUSE, IN REMISSION: ICD-10-CM

## 2025-04-24 DIAGNOSIS — R06.09 CHRONIC DYSPNEA: ICD-10-CM

## 2025-04-24 DIAGNOSIS — R05.3 CHRONIC COUGH: ICD-10-CM

## 2025-04-24 DIAGNOSIS — J44.9 CHRONIC OBSTRUCTIVE PULMONARY DISEASE, UNSPECIFIED COPD TYPE: ICD-10-CM

## 2025-04-24 DIAGNOSIS — R93.89 ABNORMAL CT SCAN, CHEST: ICD-10-CM

## 2025-04-24 DIAGNOSIS — Z86.19 HISTORY OF HISTOPLASMOSIS: ICD-10-CM

## 2025-04-24 DIAGNOSIS — J96.11 CHRONIC HYPOXEMIC RESPIRATORY FAILURE: ICD-10-CM

## 2025-04-24 PROCEDURE — 71250 CT THORAX DX C-: CPT

## 2025-04-30 ENCOUNTER — TELEPHONE (OUTPATIENT)
Dept: PULMONOLOGY | Facility: CLINIC | Age: 65
End: 2025-04-30
Payer: MEDICARE

## 2025-04-30 NOTE — TELEPHONE ENCOUNTER
Caller: Tyrone Garcia    Relationship: Self    Best call back number: [phone]    Caller requesting test results: SELF    What test was performed: CT CHEST SCAN     When was the test performed: 04/24/25    Where was the test performed:      Additional notes: PATIENT WOULD LIKE THE CT SCAN RESULTS. PLEASE REACH OUT TO PATIENT

## 2025-05-13 ENCOUNTER — HOSPITAL ENCOUNTER (OUTPATIENT)
Dept: RESPIRATORY THERAPY | Facility: HOSPITAL | Age: 65
Discharge: HOME | End: 2025-05-13
Admitting: INTERNAL MEDICINE
Payer: MEDICARE

## 2025-05-13 DIAGNOSIS — R05.3 CHRONIC COUGH: ICD-10-CM

## 2025-05-13 DIAGNOSIS — R06.09 DOE (DYSPNEA ON EXERTION): ICD-10-CM

## 2025-05-13 DIAGNOSIS — R63.4 UNINTENDED WEIGHT LOSS: ICD-10-CM

## 2025-05-13 DIAGNOSIS — J43.2 CENTRILOBULAR EMPHYSEMA: ICD-10-CM

## 2025-05-13 PROCEDURE — 94060 EVALUATION OF WHEEZING: CPT

## 2025-05-13 PROCEDURE — 94726 PLETHYSMOGRAPHY LUNG VOLUMES: CPT

## 2025-05-13 PROCEDURE — 94729 DIFFUSING CAPACITY: CPT

## 2025-05-13 RX ORDER — ALBUTEROL SULFATE 0.83 MG/ML
2.5 SOLUTION RESPIRATORY (INHALATION) ONCE
Status: COMPLETED | OUTPATIENT
Start: 2025-05-13 | End: 2025-05-13

## 2025-05-13 RX ADMIN — ALBUTEROL SULFATE 2.5 MG: 2.5 SOLUTION RESPIRATORY (INHALATION) at 14:12

## 2025-07-24 RX ORDER — BENZONATATE 200 MG/1
200 CAPSULE ORAL 3 TIMES DAILY PRN
Qty: 30 CAPSULE | Refills: 3 | Status: SHIPPED | OUTPATIENT
Start: 2025-07-24

## (undated) DEVICE — SINGLE USE SUCTION VALVE MAJ-209: Brand: SINGLE USE SUCTION VALVE (STERILE)

## (undated) DEVICE — SENSOR CONNECTION CLEANER

## (undated) DEVICE — CATHETER: Brand: ION

## (undated) DEVICE — SUT ETHLN 3-0 FS118IN 663H

## (undated) DEVICE — SUT MNCRYL PLS ANTIB UD 4/0 PS2 18IN

## (undated) DEVICE — DISPOSABLE CYTOLOGY BRUSH: Brand: DISPOSABLE CYTOLOGY BRUSH

## (undated) DEVICE — VISION PROBE ADAPTER AND SUCTION ADAPTER

## (undated) DEVICE — NON-WOVEN ADHESIVE WOUND DRESSING: Brand: PRIMAPORE ADHESIVE DRESSING 10*8CM

## (undated) DEVICE — REPROCESSING ACCESSORIES KIT

## (undated) DEVICE — VISION PROBE: Brand: ION

## (undated) DEVICE — JACKSON-PRATT 100CC BULB RESERVOIR: Brand: CARDINAL HEALTH

## (undated) DEVICE — MAJ-1414 SINGLE USE ADPATER BIOPSY VALV: Brand: SINGLE USE ADAPTOR BIOPSY VALVE

## (undated) DEVICE — GLV SURG SENSICARE PI PF LF 7 GRN STRL

## (undated) DEVICE — SOLIDIFIER LIQLOC PLS 1500CC BT

## (undated) DEVICE — CLAMP INSERT: Brand: STEALTH® CLAMP INSERT

## (undated) DEVICE — CATHETER GUIDE

## (undated) DEVICE — ADHS SKIN SURG TISS VISC PREMIERPRO EXOFIN HI/VISC FAST/DRY

## (undated) DEVICE — SINGLE USE BIOPSY VALVE MAJ-210: Brand: SINGLE USE BIOPSY VALVE (STERILE)

## (undated) DEVICE — INTENDED FOR TISSUE SEPARATION, AND OTHER PROCEDURES THAT REQUIRE A SHARP SURGICAL BLADE TO PUNCTURE OR CUT.: Brand: BARD-PARKER ® CARBON RIB-BACK BLADES

## (undated) DEVICE — SLV SCD KN/LEN ADJ EXPRSS BLENDED MD 1P/U

## (undated) DEVICE — SUT PROLN 6/0 BV1 D/A 30IN 8709H

## (undated) DEVICE — SPNG GZ 2S 2X2 4PLY STRL PK/2

## (undated) DEVICE — GLV SURG SENSICARE ORTHO PF LF 8 STRL

## (undated) DEVICE — KT LINEN QUICKSQUITE SAHARA STD DRW/LIFT 60X78IN

## (undated) DEVICE — SYRINGE ONLY,20ML LUER LOCK: Brand: MEDLINE INDUSTRIES, INC.

## (undated) DEVICE — REPROCESSING CONSUMABLES KIT

## (undated) DEVICE — STERILE POLYISOPRENE POWDER-FREE SURGICAL GLOVES: Brand: PROTEXIS

## (undated) DEVICE — PENCL E/S SMOKEEVAC W/TELESCP CANN

## (undated) DEVICE — STPCK 1WY SPINLOCK FML LL NONDEHP LF .20ML

## (undated) DEVICE — BIOPSY NEEDLE, 19G: Brand: FLEXISION

## (undated) DEVICE — TOTAL TRAY, 16FR 10ML SIL FOLEY, URN: Brand: MEDLINE

## (undated) DEVICE — SUT SILK 2/0 TIES 18IN A185H

## (undated) DEVICE — Device: Brand: BALLOON

## (undated) DEVICE — SOL NS 500ML

## (undated) DEVICE — SUT PROLN 7/0 BV1 D/A 24IN 8702H

## (undated) DEVICE — SWIVEL CONNECTOR

## (undated) DEVICE — LINER SURG CANSTR SXN S/RIGD 1500CC

## (undated) DEVICE — SUT SILK 3/0 TIES 18IN A184H

## (undated) DEVICE — Device

## (undated) DEVICE — NECK VASCULAR-LF: Brand: MEDLINE INDUSTRIES, INC.

## (undated) DEVICE — FRCP BIOP CAPTURA BRONCH 1.8 110CM BLU

## (undated) DEVICE — GOWN IMPERV OPN/BK KNT/CUF XXL

## (undated) DEVICE — DRAIN JACKSON PRATT 10MM 3/4PR: Brand: CARDINAL HEALTH

## (undated) DEVICE — 3M™ IOBAN™ 2 ANTIMICROBIAL INCISE DRAPE 6650EZ: Brand: IOBAN™ 2

## (undated) DEVICE — TRAP,MUCUS SPECIMEN,40CC: Brand: MEDLINE

## (undated) DEVICE — ANTIBACTERIAL UNDYED BRAIDED (POLYGLACTIN 910), SYNTHETIC ABSORBABLE SUTURE: Brand: COATED VICRYL

## (undated) DEVICE — SYR SLP TP 10ML DISP

## (undated) DEVICE — REPROCESSING COVER